# Patient Record
Sex: MALE | Race: WHITE | NOT HISPANIC OR LATINO | Employment: OTHER | URBAN - METROPOLITAN AREA
[De-identification: names, ages, dates, MRNs, and addresses within clinical notes are randomized per-mention and may not be internally consistent; named-entity substitution may affect disease eponyms.]

---

## 2017-01-12 ENCOUNTER — ALLSCRIPTS OFFICE VISIT (OUTPATIENT)
Dept: OTHER | Facility: OTHER | Age: 72
End: 2017-01-12

## 2017-01-12 DIAGNOSIS — E78.5 HYPERLIPIDEMIA: ICD-10-CM

## 2017-01-12 DIAGNOSIS — M10.9 GOUT: ICD-10-CM

## 2017-01-12 DIAGNOSIS — I10 ESSENTIAL (PRIMARY) HYPERTENSION: ICD-10-CM

## 2017-01-18 ENCOUNTER — ALLSCRIPTS OFFICE VISIT (OUTPATIENT)
Dept: OTHER | Facility: OTHER | Age: 72
End: 2017-01-18

## 2017-01-26 ENCOUNTER — HOSPITAL ENCOUNTER (OUTPATIENT)
Dept: NON INVASIVE DIAGNOSTICS | Facility: HOSPITAL | Age: 72
Discharge: HOME | End: 2017-01-26
Attending: SURGERY
Payer: COMMERCIAL

## 2017-01-26 ENCOUNTER — APPOINTMENT (OUTPATIENT)
Dept: PREADMISSION TESTING | Facility: HOSPITAL | Age: 72
End: 2017-01-26
Payer: COMMERCIAL

## 2017-01-26 ENCOUNTER — APPOINTMENT (OUTPATIENT)
Dept: LAB | Facility: HOSPITAL | Age: 72
End: 2017-01-26
Attending: SURGERY
Payer: COMMERCIAL

## 2017-01-26 ENCOUNTER — ANESTHESIA EVENT (OUTPATIENT)
Dept: PERIOP | Facility: HOSPITAL | Age: 72
End: 2017-01-26
Payer: COMMERCIAL

## 2017-01-26 ENCOUNTER — TRANSCRIBE ORDERS (OUTPATIENT)
Dept: ADMINISTRATIVE | Facility: HOSPITAL | Age: 72
End: 2017-01-26

## 2017-01-26 VITALS
TEMPERATURE: 97.9 F | RESPIRATION RATE: 16 BRPM | HEIGHT: 67 IN | HEART RATE: 72 BPM | DIASTOLIC BLOOD PRESSURE: 90 MMHG | BODY MASS INDEX: 24.8 KG/M2 | SYSTOLIC BLOOD PRESSURE: 156 MMHG | WEIGHT: 158 LBS

## 2017-01-26 DIAGNOSIS — K40.90 INGUINAL HERNIA WITHOUT OBSTRUCTION OR GANGRENE, RECURRENCE NOT SPECIFIED, UNSPECIFIED LATERALITY: ICD-10-CM

## 2017-01-26 DIAGNOSIS — Z01.818 PRE-OP TESTING: ICD-10-CM

## 2017-01-26 DIAGNOSIS — K40.90 INGUINAL HERNIA WITHOUT OBSTRUCTION OR GANGRENE, RECURRENCE NOT SPECIFIED, UNSPECIFIED LATERALITY: Primary | ICD-10-CM

## 2017-01-26 LAB
ATRIAL RATE: 72 BPM
P AXIS: 55 DEGREES
PR INTERVAL: 166 MS
QRS AXIS: 37 DEGREES
QRSD INTERVAL: 86 MS
QT INTERVAL: 384 MS
QTC INTERVAL: 420 MS
T WAVE AXIS: 58 DEGREES
VENTRICULAR RATE: 72 BPM

## 2017-01-26 PROCEDURE — 93005 ELECTROCARDIOGRAM TRACING: CPT

## 2017-01-26 RX ORDER — NAPROXEN 250 MG/1
250 TABLET ORAL 2 TIMES DAILY PRN
COMMUNITY
End: 2020-10-04 | Stop reason: HOSPADM

## 2017-01-26 RX ORDER — METRONIDAZOLE 10 MG/G
GEL TOPICAL
COMMUNITY
End: 2018-06-08 | Stop reason: SDUPTHER

## 2017-01-26 RX ORDER — SODIUM CHLORIDE 9 MG/ML
125 INJECTION, SOLUTION INTRAVENOUS CONTINUOUS
Status: CANCELLED | OUTPATIENT
Start: 2017-01-26

## 2017-01-26 RX ORDER — ALLOPURINOL 300 MG/1
300 TABLET ORAL DAILY
COMMUNITY
End: 2018-04-10 | Stop reason: SDUPTHER

## 2017-01-26 RX ORDER — SILODOSIN 4 MG/1
4 CAPSULE ORAL EVERY MORNING
COMMUNITY
End: 2018-10-11 | Stop reason: SDUPTHER

## 2017-01-26 RX ORDER — TRAVOPROST OPHTHALMIC SOLUTION 0.04 MG/ML
1 SOLUTION OPHTHALMIC
COMMUNITY
End: 2020-02-26 | Stop reason: ALTCHOICE

## 2017-01-26 RX ORDER — RANITIDINE 150 MG/1
150 CAPSULE ORAL
COMMUNITY
End: 2020-02-26 | Stop reason: ALTCHOICE

## 2017-01-26 RX ORDER — GABAPENTIN 300 MG/1
300 CAPSULE ORAL
COMMUNITY
End: 2020-02-26 | Stop reason: SDUPTHER

## 2017-01-26 RX ORDER — ACETAMINOPHEN 325 MG/1
650 TABLET ORAL EVERY 6 HOURS PRN
COMMUNITY
End: 2020-02-26 | Stop reason: ALTCHOICE

## 2017-01-26 RX ORDER — OMEPRAZOLE 20 MG/1
20 CAPSULE, DELAYED RELEASE ORAL DAILY
COMMUNITY
End: 2020-02-26 | Stop reason: SDUPTHER

## 2017-01-26 RX ORDER — AMOXICILLIN 500 MG/1
500 CAPSULE ORAL ONCE AS NEEDED
COMMUNITY
End: 2020-10-04 | Stop reason: HOSPADM

## 2017-01-26 RX ORDER — LOSARTAN POTASSIUM AND HYDROCHLOROTHIAZIDE 25; 100 MG/1; MG/1
1 TABLET ORAL DAILY
COMMUNITY
End: 2019-04-03 | Stop reason: SDUPTHER

## 2017-02-09 ENCOUNTER — HOSPITAL ENCOUNTER (OUTPATIENT)
Facility: HOSPITAL | Age: 72
Setting detail: OUTPATIENT SURGERY
Discharge: HOME | End: 2017-02-09
Attending: SURGERY | Admitting: SURGERY
Payer: COMMERCIAL

## 2017-02-09 ENCOUNTER — ANESTHESIA (OUTPATIENT)
Dept: PERIOP | Facility: HOSPITAL | Age: 72
End: 2017-02-09
Payer: COMMERCIAL

## 2017-02-09 VITALS
OXYGEN SATURATION: 99 % | SYSTOLIC BLOOD PRESSURE: 158 MMHG | TEMPERATURE: 97.7 F | HEART RATE: 64 BPM | DIASTOLIC BLOOD PRESSURE: 71 MMHG | RESPIRATION RATE: 16 BRPM | HEIGHT: 67 IN | BODY MASS INDEX: 24.8 KG/M2 | WEIGHT: 158 LBS

## 2017-02-09 PROCEDURE — C1781 MESH (IMPLANTABLE): HCPCS | Performed by: SURGERY

## 2017-02-09 PROCEDURE — A9270 NON-COVERED ITEM OR SERVICE: HCPCS | Performed by: SURGERY

## 2017-02-09 PROCEDURE — C1713 ANCHOR/SCREW BN/BN,TIS/BN: HCPCS | Performed by: SURGERY

## 2017-02-09 DEVICE — BARD 3DMAX MESH LEFT LARGE
Type: IMPLANTABLE DEVICE | Site: INGUINAL | Status: FUNCTIONAL
Brand: BARD 3DMAX MESH

## 2017-02-09 RX ORDER — ALBUTEROL SULFATE 2.5 MG/3ML
2.5 SOLUTION RESPIRATORY (INHALATION) ONCE AS NEEDED
Status: DISCONTINUED | OUTPATIENT
Start: 2017-02-09 | End: 2017-02-09 | Stop reason: HOSPADM

## 2017-02-09 RX ORDER — MORPHINE SULFATE 2 MG/ML
2 INJECTION, SOLUTION INTRAMUSCULAR; INTRAVENOUS
Status: DISCONTINUED | OUTPATIENT
Start: 2017-02-09 | End: 2017-02-09 | Stop reason: HOSPADM

## 2017-02-09 RX ORDER — MAGNESIUM HYDROXIDE 1200 MG/15ML
LIQUID ORAL AS NEEDED
Status: DISCONTINUED | OUTPATIENT
Start: 2017-02-09 | End: 2017-02-09 | Stop reason: HOSPADM

## 2017-02-09 RX ORDER — FENTANYL CITRATE/PF 50 MCG/ML
50 SYRINGE (ML) INJECTION
Status: DISCONTINUED | OUTPATIENT
Start: 2017-02-09 | End: 2017-02-09 | Stop reason: HOSPADM

## 2017-02-09 RX ORDER — KETOROLAC TROMETHAMINE 30 MG/ML
INJECTION, SOLUTION INTRAMUSCULAR; INTRAVENOUS AS NEEDED
Status: DISCONTINUED | OUTPATIENT
Start: 2017-02-09 | End: 2017-02-09 | Stop reason: SURG

## 2017-02-09 RX ORDER — HYDROCODONE BITARTRATE AND ACETAMINOPHEN 5; 325 MG/1; MG/1
1 TABLET ORAL EVERY 4 HOURS PRN
Status: DISCONTINUED | OUTPATIENT
Start: 2017-02-09 | End: 2017-02-09 | Stop reason: HOSPADM

## 2017-02-09 RX ORDER — HYDROCODONE BITARTRATE AND ACETAMINOPHEN 5; 325 MG/1; MG/1
2 TABLET ORAL EVERY 6 HOURS PRN
Status: DISCONTINUED | OUTPATIENT
Start: 2017-02-09 | End: 2017-02-09 | Stop reason: HOSPADM

## 2017-02-09 RX ORDER — MIDAZOLAM HYDROCHLORIDE 1 MG/ML
INJECTION INTRAMUSCULAR; INTRAVENOUS AS NEEDED
Status: DISCONTINUED | OUTPATIENT
Start: 2017-02-09 | End: 2017-02-09 | Stop reason: SURG

## 2017-02-09 RX ORDER — SODIUM CHLORIDE, SODIUM LACTATE, POTASSIUM CHLORIDE, CALCIUM CHLORIDE 600; 310; 30; 20 MG/100ML; MG/100ML; MG/100ML; MG/100ML
125 INJECTION, SOLUTION INTRAVENOUS CONTINUOUS
Status: DISCONTINUED | OUTPATIENT
Start: 2017-02-09 | End: 2017-02-09 | Stop reason: HOSPADM

## 2017-02-09 RX ORDER — GLYCOPYRROLATE 0.2 MG/ML
INJECTION INTRAMUSCULAR; INTRAVENOUS AS NEEDED
Status: DISCONTINUED | OUTPATIENT
Start: 2017-02-09 | End: 2017-02-09 | Stop reason: SURG

## 2017-02-09 RX ORDER — HYDROCODONE BITARTRATE AND ACETAMINOPHEN 5; 325 MG/1; MG/1
1-2 TABLET ORAL EVERY 6 HOURS PRN
Qty: 30 TABLET | Refills: 0 | Status: SHIPPED | OUTPATIENT
Start: 2017-02-09 | End: 2017-02-19

## 2017-02-09 RX ORDER — BUPIVACAINE HYDROCHLORIDE AND EPINEPHRINE 2.5; 5 MG/ML; UG/ML
INJECTION, SOLUTION EPIDURAL; INFILTRATION; INTRACAUDAL; PERINEURAL AS NEEDED
Status: DISCONTINUED | OUTPATIENT
Start: 2017-02-09 | End: 2017-02-09 | Stop reason: HOSPADM

## 2017-02-09 RX ORDER — MULTIVITAMIN
1 CAPSULE ORAL DAILY
COMMUNITY
End: 2020-02-26 | Stop reason: ALTCHOICE

## 2017-02-09 RX ORDER — SODIUM CHLORIDE 9 MG/ML
125 INJECTION, SOLUTION INTRAVENOUS CONTINUOUS
Status: DISCONTINUED | OUTPATIENT
Start: 2017-02-09 | End: 2017-02-09 | Stop reason: HOSPADM

## 2017-02-09 RX ORDER — EPHEDRINE SULFATE 50 MG/ML
INJECTION, SOLUTION INTRAVENOUS AS NEEDED
Status: DISCONTINUED | OUTPATIENT
Start: 2017-02-09 | End: 2017-02-09 | Stop reason: SURG

## 2017-02-09 RX ORDER — PROPOFOL 10 MG/ML
INJECTION, EMULSION INTRAVENOUS AS NEEDED
Status: DISCONTINUED | OUTPATIENT
Start: 2017-02-09 | End: 2017-02-09 | Stop reason: SURG

## 2017-02-09 RX ORDER — ONDANSETRON 2 MG/ML
4 INJECTION INTRAMUSCULAR; INTRAVENOUS ONCE
Status: DISCONTINUED | OUTPATIENT
Start: 2017-02-09 | End: 2017-02-09 | Stop reason: HOSPADM

## 2017-02-09 RX ORDER — FENTANYL CITRATE 50 UG/ML
INJECTION, SOLUTION INTRAMUSCULAR; INTRAVENOUS AS NEEDED
Status: DISCONTINUED | OUTPATIENT
Start: 2017-02-09 | End: 2017-02-09 | Stop reason: SURG

## 2017-02-09 RX ORDER — ROCURONIUM BROMIDE 10 MG/ML
INJECTION, SOLUTION INTRAVENOUS AS NEEDED
Status: DISCONTINUED | OUTPATIENT
Start: 2017-02-09 | End: 2017-02-09 | Stop reason: SURG

## 2017-02-09 RX ORDER — ONDANSETRON 2 MG/ML
INJECTION INTRAMUSCULAR; INTRAVENOUS AS NEEDED
Status: DISCONTINUED | OUTPATIENT
Start: 2017-02-09 | End: 2017-02-09 | Stop reason: SURG

## 2017-02-09 RX ADMIN — ONDANSETRON HYDROCHLORIDE 4 MG: 2 INJECTION, SOLUTION INTRAVENOUS at 11:50

## 2017-02-09 RX ADMIN — HYDROCODONE BITARTRATE AND ACETAMINOPHEN 1 TABLET: 5; 325 TABLET ORAL at 14:44

## 2017-02-09 RX ADMIN — PROPOFOL 200 MG: 10 INJECTION, EMULSION INTRAVENOUS at 12:01

## 2017-02-09 RX ADMIN — EPHEDRINE SULFATE 10 MG: 50 INJECTION, SOLUTION INTRAMUSCULAR; INTRAVENOUS; SUBCUTANEOUS at 12:18

## 2017-02-09 RX ADMIN — EPHEDRINE SULFATE 10 MG: 50 INJECTION, SOLUTION INTRAMUSCULAR; INTRAVENOUS; SUBCUTANEOUS at 12:15

## 2017-02-09 RX ADMIN — FENTANYL CITRATE 50 MCG: 50 INJECTION, SOLUTION INTRAMUSCULAR; INTRAVENOUS at 12:30

## 2017-02-09 RX ADMIN — CEFAZOLIN SODIUM 1000 MG: 1 SOLUTION INTRAVENOUS at 11:50

## 2017-02-09 RX ADMIN — ROCURONIUM BROMIDE 10 MG: 10 INJECTION, SOLUTION INTRAVENOUS at 12:25

## 2017-02-09 RX ADMIN — MIDAZOLAM HYDROCHLORIDE 2 MG: 1 INJECTION, SOLUTION INTRAMUSCULAR; INTRAVENOUS at 11:50

## 2017-02-09 RX ADMIN — SODIUM CHLORIDE: 0.9 INJECTION, SOLUTION INTRAVENOUS at 13:15

## 2017-02-09 RX ADMIN — GLYCOPYRROLATE 0.4 MG: 0.2 INJECTION, SOLUTION INTRAMUSCULAR; INTRAVENOUS at 13:13

## 2017-02-09 RX ADMIN — FENTANYL CITRATE 100 MCG: 50 INJECTION, SOLUTION INTRAMUSCULAR; INTRAVENOUS at 12:01

## 2017-02-09 RX ADMIN — FENTANYL CITRATE 100 MCG: 50 INJECTION, SOLUTION INTRAMUSCULAR; INTRAVENOUS at 12:25

## 2017-02-09 RX ADMIN — ROCURONIUM BROMIDE 40 MG: 10 INJECTION, SOLUTION INTRAVENOUS at 12:01

## 2017-02-09 RX ADMIN — NEOSTIGMINE METHYLSULFATE 3 MG: 1 INJECTION INTRAMUSCULAR; INTRAVENOUS; SUBCUTANEOUS at 13:13

## 2017-02-09 RX ADMIN — SODIUM CHLORIDE 125 ML/HR: 0.9 INJECTION, SOLUTION INTRAVENOUS at 10:00

## 2017-02-09 RX ADMIN — KETOROLAC TROMETHAMINE 30 MG: 30 INJECTION, SOLUTION INTRAMUSCULAR at 13:05

## 2017-02-09 RX ADMIN — SODIUM CHLORIDE: 0.9 INJECTION, SOLUTION INTRAVENOUS at 12:17

## 2017-02-22 ENCOUNTER — ALLSCRIPTS OFFICE VISIT (OUTPATIENT)
Dept: OTHER | Facility: OTHER | Age: 72
End: 2017-02-22

## 2017-06-22 ENCOUNTER — ALLSCRIPTS OFFICE VISIT (OUTPATIENT)
Dept: OTHER | Facility: OTHER | Age: 72
End: 2017-06-22

## 2017-06-30 ENCOUNTER — HOSPITAL ENCOUNTER (INPATIENT)
Facility: HOSPITAL | Age: 72
LOS: 1 days | Discharge: HOME | DRG: 869 | End: 2017-07-01
Attending: EMERGENCY MEDICINE | Admitting: INTERNAL MEDICINE
Payer: COMMERCIAL

## 2017-06-30 ENCOUNTER — ALLSCRIPTS OFFICE VISIT (OUTPATIENT)
Dept: OTHER | Facility: OTHER | Age: 72
End: 2017-06-30

## 2017-06-30 ENCOUNTER — APPOINTMENT (EMERGENCY)
Dept: CT IMAGING | Facility: HOSPITAL | Age: 72
DRG: 869 | End: 2017-06-30
Payer: COMMERCIAL

## 2017-06-30 DIAGNOSIS — Z78.9 FAILURE OF OUTPATIENT TREATMENT: ICD-10-CM

## 2017-06-30 DIAGNOSIS — L03.116 CELLULITIS OF LEFT LOWER EXTREMITY: Primary | ICD-10-CM

## 2017-06-30 PROBLEM — K21.9 GASTROESOPHAGEAL REFLUX DISEASE WITHOUT ESOPHAGITIS: Chronic | Status: ACTIVE | Noted: 2017-06-30

## 2017-06-30 PROBLEM — H25.9 AGE-RELATED CATARACT OF BOTH EYES: Chronic | Status: ACTIVE | Noted: 2017-06-30

## 2017-06-30 PROBLEM — Z87.19 HISTORY OF ABDOMINAL HERNIA: Status: ACTIVE | Noted: 2017-06-30

## 2017-06-30 PROBLEM — Z96.641 HISTORY OF TOTAL RIGHT HIP ARTHROPLASTY: Chronic | Status: ACTIVE | Noted: 2017-06-30

## 2017-06-30 PROBLEM — G60.9 IDIOPATHIC PERIPHERAL NEUROPATHY: Chronic | Status: ACTIVE | Noted: 2017-06-30

## 2017-06-30 PROBLEM — I10 ESSENTIAL HYPERTENSION: Chronic | Status: ACTIVE | Noted: 2017-06-30

## 2017-06-30 PROBLEM — M1A.9XX0 CHRONIC GOUT: Chronic | Status: ACTIVE | Noted: 2017-06-30

## 2017-06-30 PROBLEM — N41.1 PROSTATITIS, CHRONIC: Chronic | Status: ACTIVE | Noted: 2017-06-30

## 2017-06-30 PROBLEM — H40.1190 PRIMARY OPEN ANGLE GLAUCOMA: Chronic | Status: ACTIVE | Noted: 2017-06-30

## 2017-06-30 PROBLEM — L71.9 ROSACEA: Chronic | Status: ACTIVE | Noted: 2017-06-30

## 2017-06-30 PROBLEM — N20.0 NEPHROLITHIASIS: Chronic | Status: ACTIVE | Noted: 2017-06-30

## 2017-06-30 LAB
ANION GAP SERPL CALCULATED.3IONS-SCNC: 7 MMOL/L (ref 4–13)
BACTERIA UR QL AUTO: ABNORMAL /HPF
BASOPHILS # BLD AUTO: 0.03 THOUSANDS/ΜL (ref 0–0.1)
BASOPHILS NFR BLD AUTO: 1 % (ref 0–1)
BILIRUB UR QL STRIP: NEGATIVE
BUN SERPL-MCNC: 20 MG/DL (ref 5–25)
CALCIUM SERPL-MCNC: 9.1 MG/DL (ref 8.3–10.1)
CHLORIDE SERPL-SCNC: 104 MMOL/L (ref 100–108)
CLARITY UR: CLEAR
CO2 SERPL-SCNC: 27 MMOL/L (ref 21–32)
COLOR UR: YELLOW
CREAT SERPL-MCNC: 1.15 MG/DL (ref 0.6–1.3)
CRP SERPL QL: 34.3 MG/L
EOSINOPHIL # BLD AUTO: 0.13 THOUSAND/ΜL (ref 0–0.61)
EOSINOPHIL NFR BLD AUTO: 3 % (ref 0–6)
ERYTHROCYTE [DISTWIDTH] IN BLOOD BY AUTOMATED COUNT: 15.7 % (ref 11.6–15.1)
ERYTHROCYTE [SEDIMENTATION RATE] IN BLOOD: 12 MM/HOUR (ref 0–10)
GFR SERPL CREATININE-BSD FRML MDRD: >60 ML/MIN/1.73SQ M
GLUCOSE SERPL-MCNC: 114 MG/DL (ref 65–140)
GLUCOSE UR STRIP-MCNC: NEGATIVE MG/DL
HCT VFR BLD AUTO: 41.3 % (ref 36.5–49.3)
HGB BLD-MCNC: 13.7 G/DL (ref 12–17)
HGB UR QL STRIP.AUTO: NEGATIVE
KETONES UR STRIP-MCNC: NEGATIVE MG/DL
LACTATE SERPL-SCNC: 0.7 MMOL/L (ref 0.5–2)
LEUKOCYTE ESTERASE UR QL STRIP: ABNORMAL
LYMPHOCYTES # BLD AUTO: 0.99 THOUSANDS/ΜL (ref 0.6–4.47)
LYMPHOCYTES NFR BLD AUTO: 19 % (ref 14–44)
MAGNESIUM SERPL-MCNC: 2 MG/DL (ref 1.6–2.6)
MCH RBC QN AUTO: 27.2 PG (ref 26.8–34.3)
MCHC RBC AUTO-ENTMCNC: 33.2 G/DL (ref 31.4–37.4)
MCV RBC AUTO: 82 FL (ref 82–98)
MONOCYTES # BLD AUTO: 0.45 THOUSAND/ΜL (ref 0.17–1.22)
MONOCYTES NFR BLD AUTO: 9 % (ref 4–12)
NEUTROPHILS # BLD AUTO: 3.63 THOUSANDS/ΜL (ref 1.85–7.62)
NEUTS SEG NFR BLD AUTO: 68 % (ref 43–75)
NITRITE UR QL STRIP: NEGATIVE
NON-SQ EPI CELLS URNS QL MICRO: ABNORMAL /HPF
NRBC BLD AUTO-RTO: 0 /100 WBCS
PH UR STRIP.AUTO: 5.5 [PH] (ref 4.5–8)
PLATELET # BLD AUTO: 253 THOUSANDS/UL (ref 149–390)
PMV BLD AUTO: 9.7 FL (ref 8.9–12.7)
POTASSIUM SERPL-SCNC: 4 MMOL/L (ref 3.5–5.3)
PROT UR STRIP-MCNC: NEGATIVE MG/DL
RBC # BLD AUTO: 5.04 MILLION/UL (ref 3.88–5.62)
RBC #/AREA URNS AUTO: ABNORMAL /HPF
SODIUM SERPL-SCNC: 138 MMOL/L (ref 136–145)
SP GR UR STRIP.AUTO: 1.02 (ref 1–1.03)
UROBILINOGEN UR QL STRIP.AUTO: 0.2 E.U./DL
WBC # BLD AUTO: 5.23 THOUSAND/UL (ref 4.31–10.16)
WBC #/AREA URNS AUTO: ABNORMAL /HPF

## 2017-06-30 PROCEDURE — 36415 COLL VENOUS BLD VENIPUNCTURE: CPT | Performed by: EMERGENCY MEDICINE

## 2017-06-30 PROCEDURE — 81001 URINALYSIS AUTO W/SCOPE: CPT

## 2017-06-30 PROCEDURE — 81002 URINALYSIS NONAUTO W/O SCOPE: CPT | Performed by: EMERGENCY MEDICINE

## 2017-06-30 PROCEDURE — 80048 BASIC METABOLIC PNL TOTAL CA: CPT | Performed by: EMERGENCY MEDICINE

## 2017-06-30 PROCEDURE — 83735 ASSAY OF MAGNESIUM: CPT | Performed by: EMERGENCY MEDICINE

## 2017-06-30 PROCEDURE — 99284 EMERGENCY DEPT VISIT MOD MDM: CPT

## 2017-06-30 PROCEDURE — 73701 CT LOWER EXTREMITY W/DYE: CPT

## 2017-06-30 PROCEDURE — 85652 RBC SED RATE AUTOMATED: CPT | Performed by: PHYSICIAN ASSISTANT

## 2017-06-30 PROCEDURE — 86140 C-REACTIVE PROTEIN: CPT | Performed by: PHYSICIAN ASSISTANT

## 2017-06-30 PROCEDURE — 87040 BLOOD CULTURE FOR BACTERIA: CPT | Performed by: EMERGENCY MEDICINE

## 2017-06-30 PROCEDURE — 83605 ASSAY OF LACTIC ACID: CPT | Performed by: EMERGENCY MEDICINE

## 2017-06-30 PROCEDURE — 96365 THER/PROPH/DIAG IV INF INIT: CPT

## 2017-06-30 PROCEDURE — 85025 COMPLETE CBC W/AUTO DIFF WBC: CPT | Performed by: EMERGENCY MEDICINE

## 2017-06-30 RX ORDER — TAMSULOSIN HYDROCHLORIDE 0.4 MG/1
0.4 CAPSULE ORAL
Status: DISCONTINUED | OUTPATIENT
Start: 2017-07-01 | End: 2017-07-01

## 2017-06-30 RX ORDER — VANCOMYCIN HYDROCHLORIDE 1 G/200ML
15 INJECTION, SOLUTION INTRAVENOUS ONCE
Status: COMPLETED | OUTPATIENT
Start: 2017-06-30 | End: 2017-06-30

## 2017-06-30 RX ORDER — ALLOPURINOL 300 MG/1
300 TABLET ORAL DAILY
Status: DISCONTINUED | OUTPATIENT
Start: 2017-07-01 | End: 2017-07-01 | Stop reason: HOSPADM

## 2017-06-30 RX ORDER — HEPARIN SODIUM 5000 [USP'U]/ML
5000 INJECTION, SOLUTION INTRAVENOUS; SUBCUTANEOUS EVERY 8 HOURS SCHEDULED
Status: DISCONTINUED | OUTPATIENT
Start: 2017-06-30 | End: 2017-07-01 | Stop reason: HOSPADM

## 2017-06-30 RX ORDER — ST. JOHN'S WORT 300 MG
CAPSULE ORAL DAILY
Status: DISCONTINUED | OUTPATIENT
Start: 2017-07-01 | End: 2017-06-30 | Stop reason: CLARIF

## 2017-06-30 RX ORDER — VANCOMYCIN HYDROCHLORIDE 1 G/200ML
15 INJECTION, SOLUTION INTRAVENOUS EVERY 12 HOURS
Status: DISCONTINUED | OUTPATIENT
Start: 2017-07-01 | End: 2017-07-01

## 2017-06-30 RX ORDER — CEPHALEXIN 500 MG/1
500 CAPSULE ORAL EVERY 8 HOURS SCHEDULED
COMMUNITY
End: 2017-07-01 | Stop reason: HOSPADM

## 2017-06-30 RX ORDER — TRAVOPROST OPHTHALMIC SOLUTION 0.04 MG/ML
1 SOLUTION OPHTHALMIC
Status: DISCONTINUED | OUTPATIENT
Start: 2017-06-30 | End: 2017-07-01 | Stop reason: HOSPADM

## 2017-06-30 RX ORDER — LANOLIN ALCOHOL/MO/W.PET/CERES
6 CREAM (GRAM) TOPICAL
Status: DISCONTINUED | OUTPATIENT
Start: 2017-06-30 | End: 2017-07-01 | Stop reason: HOSPADM

## 2017-06-30 RX ORDER — METRONIDAZOLE 10 MG/G
GEL TOPICAL
Status: DISCONTINUED | OUTPATIENT
Start: 2017-06-30 | End: 2017-07-01 | Stop reason: RX

## 2017-06-30 RX ORDER — ONDANSETRON 2 MG/ML
4 INJECTION INTRAMUSCULAR; INTRAVENOUS EVERY 4 HOURS PRN
Status: DISCONTINUED | OUTPATIENT
Start: 2017-06-30 | End: 2017-07-01 | Stop reason: HOSPADM

## 2017-06-30 RX ORDER — GABAPENTIN 300 MG/1
300 CAPSULE ORAL
Status: DISCONTINUED | OUTPATIENT
Start: 2017-06-30 | End: 2017-07-01 | Stop reason: HOSPADM

## 2017-06-30 RX ORDER — PANTOPRAZOLE SODIUM 20 MG/1
20 TABLET, DELAYED RELEASE ORAL
Status: DISCONTINUED | OUTPATIENT
Start: 2017-07-01 | End: 2017-07-01 | Stop reason: HOSPADM

## 2017-06-30 RX ORDER — ACETAMINOPHEN 325 MG/1
650 TABLET ORAL EVERY 6 HOURS PRN
Status: DISCONTINUED | OUTPATIENT
Start: 2017-06-30 | End: 2017-07-01 | Stop reason: HOSPADM

## 2017-06-30 RX ADMIN — GABAPENTIN 300 MG: 300 CAPSULE ORAL at 22:05

## 2017-06-30 RX ADMIN — VANCOMYCIN HYDROCHLORIDE 1000 MG: 1 INJECTION, SOLUTION INTRAVENOUS at 16:14

## 2017-06-30 RX ADMIN — HEPARIN SODIUM 5000 UNITS: 5000 INJECTION, SOLUTION INTRAVENOUS; SUBCUTANEOUS at 22:06

## 2017-06-30 RX ADMIN — TRAVOPROST 1 DROP: 0.04 SOLUTION/ DROPS OPHTHALMIC at 22:05

## 2017-06-30 RX ADMIN — IOHEXOL 100 ML: 350 INJECTION, SOLUTION INTRAVENOUS at 17:28

## 2017-06-30 RX ADMIN — SODIUM CHLORIDE 1000 ML: 0.9 INJECTION, SOLUTION INTRAVENOUS at 16:05

## 2017-06-30 RX ADMIN — METRONIDAZOLE: 10 GEL TOPICAL at 22:07

## 2017-07-01 VITALS
DIASTOLIC BLOOD PRESSURE: 83 MMHG | SYSTOLIC BLOOD PRESSURE: 157 MMHG | BODY MASS INDEX: 25.84 KG/M2 | RESPIRATION RATE: 18 BRPM | WEIGHT: 165 LBS | TEMPERATURE: 95.5 F | OXYGEN SATURATION: 97 % | HEART RATE: 64 BPM

## 2017-07-01 PROBLEM — A69.20 LYME DISEASE: Status: ACTIVE | Noted: 2017-06-30

## 2017-07-01 LAB
ANION GAP SERPL CALCULATED.3IONS-SCNC: 9 MMOL/L (ref 4–13)
BUN SERPL-MCNC: 18 MG/DL (ref 5–25)
CALCIUM SERPL-MCNC: 9.2 MG/DL (ref 8.3–10.1)
CHLORIDE SERPL-SCNC: 104 MMOL/L (ref 100–108)
CO2 SERPL-SCNC: 28 MMOL/L (ref 21–32)
CREAT SERPL-MCNC: 1.04 MG/DL (ref 0.6–1.3)
ERYTHROCYTE [DISTWIDTH] IN BLOOD BY AUTOMATED COUNT: 15.8 % (ref 11.6–15.1)
GFR SERPL CREATININE-BSD FRML MDRD: >60 ML/MIN/1.73SQ M
GLUCOSE SERPL-MCNC: 104 MG/DL (ref 65–140)
HCT VFR BLD AUTO: 41.4 % (ref 36.5–49.3)
HGB BLD-MCNC: 13.3 G/DL (ref 12–17)
MCH RBC QN AUTO: 26.7 PG (ref 26.8–34.3)
MCHC RBC AUTO-ENTMCNC: 32.1 G/DL (ref 31.4–37.4)
MCV RBC AUTO: 83 FL (ref 82–98)
PLATELET # BLD AUTO: 250 THOUSANDS/UL (ref 149–390)
PMV BLD AUTO: 10.5 FL (ref 8.9–12.7)
POTASSIUM SERPL-SCNC: 4 MMOL/L (ref 3.5–5.3)
RBC # BLD AUTO: 4.98 MILLION/UL (ref 3.88–5.62)
SODIUM SERPL-SCNC: 141 MMOL/L (ref 136–145)
WBC # BLD AUTO: 5.54 THOUSAND/UL (ref 4.31–10.16)

## 2017-07-01 PROCEDURE — 85027 COMPLETE CBC AUTOMATED: CPT | Performed by: PHYSICIAN ASSISTANT

## 2017-07-01 PROCEDURE — 80048 BASIC METABOLIC PNL TOTAL CA: CPT | Performed by: PHYSICIAN ASSISTANT

## 2017-07-01 RX ORDER — SILODOSIN 4 MG/1
4 CAPSULE ORAL EVERY MORNING
Status: DISCONTINUED | OUTPATIENT
Start: 2017-07-02 | End: 2017-07-01 | Stop reason: HOSPADM

## 2017-07-01 RX ORDER — DOXYCYCLINE HYCLATE 100 MG/1
100 CAPSULE ORAL EVERY 12 HOURS SCHEDULED
Status: DISCONTINUED | OUTPATIENT
Start: 2017-07-01 | End: 2017-07-01 | Stop reason: HOSPADM

## 2017-07-01 RX ORDER — DOXYCYCLINE HYCLATE 100 MG/1
100 CAPSULE ORAL EVERY 12 HOURS SCHEDULED
Qty: 27 CAPSULE | Refills: 0 | Status: SHIPPED | OUTPATIENT
Start: 2017-07-01 | End: 2017-07-15

## 2017-07-01 RX ADMIN — ALLOPURINOL 300 MG: 300 TABLET ORAL at 08:19

## 2017-07-01 RX ADMIN — HYDROCHLOROTHIAZIDE: 25 TABLET ORAL at 08:18

## 2017-07-01 RX ADMIN — HEPARIN SODIUM 5000 UNITS: 5000 INJECTION, SOLUTION INTRAVENOUS; SUBCUTANEOUS at 06:09

## 2017-07-01 RX ADMIN — DOXYCYCLINE 100 MG: 100 CAPSULE ORAL at 12:30

## 2017-07-01 RX ADMIN — PANTOPRAZOLE SODIUM 20 MG: 20 TABLET, DELAYED RELEASE ORAL at 06:09

## 2017-07-01 RX ADMIN — VANCOMYCIN HYDROCHLORIDE 1000 MG: 1 INJECTION, SOLUTION INTRAVENOUS at 04:41

## 2017-07-05 LAB
BACTERIA BLD CULT: NORMAL
BACTERIA BLD CULT: NORMAL

## 2017-07-10 ENCOUNTER — ALLSCRIPTS OFFICE VISIT (OUTPATIENT)
Dept: OTHER | Facility: OTHER | Age: 72
End: 2017-07-10

## 2017-07-10 ENCOUNTER — GENERIC CONVERSION - ENCOUNTER (OUTPATIENT)
Dept: OTHER | Facility: OTHER | Age: 72
End: 2017-07-10

## 2017-10-26 ENCOUNTER — HOSPITAL ENCOUNTER (OUTPATIENT)
Dept: RADIOLOGY | Facility: HOSPITAL | Age: 72
Discharge: HOME | End: 2017-10-26
Payer: COMMERCIAL

## 2017-10-26 ENCOUNTER — APPOINTMENT (OUTPATIENT)
Dept: LAB | Facility: HOSPITAL | Age: 72
End: 2017-10-26
Attending: UROLOGY
Payer: COMMERCIAL

## 2017-10-26 DIAGNOSIS — Z12.5 ENCOUNTER FOR SCREENING FOR MALIGNANT NEOPLASM OF PROSTATE: ICD-10-CM

## 2017-10-26 DIAGNOSIS — N20.0 CALCULUS OF KIDNEY: ICD-10-CM

## 2017-10-26 LAB — PSA SERPL-MCNC: 2 NG/ML (ref 0–4)

## 2017-10-26 PROCEDURE — G0103 PSA SCREENING: HCPCS

## 2017-10-26 PROCEDURE — 74000 HB X-RAY EXAM OF ABDOMEN (SINGLE ANTEROPOSTERIOR VIEW): CPT

## 2017-10-26 PROCEDURE — 36415 COLL VENOUS BLD VENIPUNCTURE: CPT

## 2017-10-31 ENCOUNTER — GENERIC CONVERSION - ENCOUNTER (OUTPATIENT)
Dept: OTHER | Facility: OTHER | Age: 72
End: 2017-10-31

## 2017-11-09 ENCOUNTER — GENERIC CONVERSION - ENCOUNTER (OUTPATIENT)
Dept: OTHER | Facility: OTHER | Age: 72
End: 2017-11-09

## 2017-11-10 ENCOUNTER — GENERIC CONVERSION - ENCOUNTER (OUTPATIENT)
Dept: OTHER | Facility: OTHER | Age: 72
End: 2017-11-10

## 2017-11-14 ENCOUNTER — GENERIC CONVERSION - ENCOUNTER (OUTPATIENT)
Dept: INTERNAL MEDICINE CLINIC | Facility: CLINIC | Age: 72
End: 2017-11-14

## 2018-01-09 ENCOUNTER — ALLSCRIPTS OFFICE VISIT (OUTPATIENT)
Dept: OTHER | Facility: OTHER | Age: 73
End: 2018-01-09

## 2018-01-12 VITALS
HEART RATE: 76 BPM | DIASTOLIC BLOOD PRESSURE: 90 MMHG | BODY MASS INDEX: 25.43 KG/M2 | HEIGHT: 67 IN | WEIGHT: 162.05 LBS | SYSTOLIC BLOOD PRESSURE: 140 MMHG | RESPIRATION RATE: 16 BRPM | TEMPERATURE: 98.5 F

## 2018-01-12 NOTE — PROGRESS NOTES
Assessment   1  Nephrolithiasis (592 0) (N20 0)   2  Encounter for prostate cancer screening (V76 44) (Z12 5)   3  Chronic prostatitis (601 1) (N41 1)    Plan   Encounter for prostate cancer screening    · (1) PSA (SCREEN) (Dx V76 44 Screen for Prostate Cancer); Status:Active; Requested    AJR:[id number];    Perform:[facility]; MRL:60BDC3657;TJQYFVZ;UMB:LJZPDAWGH for prostate cancer screening; Ordered By:Jacinda Harris;  Nephrolithiasis    · * XR ABDOMEN 1 VIEW KUB; Status:Active - Retrospective Authorization; Requested    BHW:[id number];    Perform:[facility]; [facility]:[id number]; Last Updated Gilmar Lazaro; 1/9/2018 11:20:59 AM;Ordered;For:Nephrolithiasis; Ordered By:Jacinda Harris;   · Follow-up visit in 1 year Evaluation and Treatment  Follow-up  Status: Complete -    Retrospective Authorization  Done: 25RIW3060   Ordered; For: Nephrolithiasis; Ordered By: Desean Lozano Performed:  Due: 91UGI1294; Last Updated By: Houston Ashton; 1/9/2018 11:22:38 AM    Discussion/Summary   Discussion Summary:    1  nephrolithiasis -- managed by Dr Clement Gilman I reviewed with the patient that his nephrolithiasis stable on imaging  We discussed options of observation versus lithotripsy versus ureteroscopy  He elects for observation at this time  We discussed the importance of proper hydration and dietary modifications in order to prevent future stone formation  follow up 1 year with a KUB prior to visit  Patient is aware to contact us sooner with any signs and symptoms passing stone  All questions answered  prostatitis well controlled  continue self start antibiotic     routine prostate cancer screening PSA stable with benign examination  Follow up in 1 year with PSA prior to visit        Chief Complaint   Chief Complaint Free Text Note Form: Patient presents for nephrolithiasis, prostatitis, prostate cancer screening  = 2 0 (10/26/17)      History of Present Illness   HPI: Irvin Gilliland is a 45-year-old male patient of Dr Clement Gilman with a history of nephrolithiasis, prostatitis, and routine prostate cancer screening    had a recent PSA which was 2 0 (10/26/17), previously 1 4 in March 2016    had a recent KUB (10/26/17) which revealed persistent bilateral renal calculi measuring up to approximately 4 mm on the left and 5 mm on the right  patient has a very long-standing history of nephrolithiasis presenting over the past 20 years  He is required post ureteroscopy and lithotripsy before in the past  Patient does state he did pass a small stone over the past year without difficulty  His stones have classically been calcium oxalate before in the past    does have recurrent pass of prostatitis  He has a prescription for antibiotics for self start treatment with symptoms of prostatitis  He denies any issues over the past year  continues to take Rapaflo daily  He feels like he has a fair stream, and 17 after urination, and has nocturia 1-2 times nightly  He notices some hesitancy in the evening  Otherwise denies any dysuria, gross hematuria, incontinence, suprapubic pressure, or urinary tract infections  Review of Systems   Complete-Male Urology:      Constitutional: No fever or chills, feels well, no tiredness, no recent weight gain or weight loss  Respiratory: No complaints of shortness of breath, no wheezing, no cough, no SOB on exertion, no orthopnea or PND  Cardiovascular: No complaints of slow heart rate, no fast heart rate, no chest pain, no palpitations, no leg claudication, no lower extremity  Gastrointestinal: No complaints of abdominal pain, no constipation, no nausea or vomiting, no diarrhea or bloody stools        Genitourinary: patient reports having pain in the prostate sometimes,-- Empty sensation,-- feelings of urinary urgency-- (Sometimes)-- and-- stream quality fair, but-- no dysuria,-- no hematuria-- and-- no incontinence--       The patient presents with complaints of urinary hesitancy (in the PM)  The patient presents with complaints of no nocturia (1-2 times)      Musculoskeletal: No complaints of arthralgia, no myalgias, no joint swelling or stiffness, no limb pain or swelling  Integumentary: No complaints of skin rash or skin lesions, no itching, no skin wound, no dry skin  Hematologic/Lymphatic: No complaints of swollen glands, no swollen glands in the neck, does not bleed easily, no easy bruising  Neurological: No compliants of headache, no confusion, no convulsions, no numbness or tingling, no dizziness or fainting, no limb weakness, no difficulty walking  ROS Reviewed:    ROS reviewed  Active Problems   1  Chronic prostatitis (601 1) (N41 1)   2  Diverticulosis (562 10) (K57 90)   3  Encounter for prostate cancer screening (V76 44) (Z12 5)   4  GERD (gastroesophageal reflux disease) (530 81) (K21 9)   5  Glaucoma (365 9) (H40 9)   6  Gout (274 9) (M10 9)   7  Hyperlipidemia (272 4) (E78 5)   8  Hypertension (401 9) (I10)   9  Nephrolithiasis (592 0) (N20 0)   10  Osteoarthritis (715 90) (M19 90)   11  Peripheral neuropathy (356 9) (G62 9)   12  Rosacea (695 3) (L71 9)   13  Seborrheic keratosis (702 19) (L82 1)   14  Status post laparoscopic hernia repair (V45 89) (Z98 890,Z87 19)   15  Therapy failure due to antibiotic resistance (V09 80) (Z16 20)    Past Medical History   1  History of Acute bacterial prostatitis (601 0) (N41 0)   2  History of Benign colon polyp (211 3) (K63 5)   3  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)   4  History of bilateral inguinal hernias (V45 89) (Z98 890,Z87 19)   5  History of Lyme disease (V12 09) (Z86 19)   6  History of Inguinal hernia (550 90) (K40 90)   7  History of Left inguinal hernia (550 90) (K40 90)   8  History of Podagra (274 01) (M10 9)  Active Problems And Past Medical History Reviewed: The active problems and past medical history were reviewed and updated today        Surgical History   1  History of Complete Colonoscopy   2  History of Inguinal Hernia Repair   3  History of Inguinal Hernia Repair - Recurrent   4  History of Palmar Fasciotomy For Dupuytren's Contracture   5  History of Total Hip Replacement  Surgical History Reviewed: The surgical history was reviewed and updated today  Family History   Mother    1  Family history of Coronary artery disease  Father    2  Family history of Cancer  Family History Reviewed: The family history was reviewed and updated today  Social History    · Former smoker (M24 66) (G63 592)   ·    · Never a smoker   · Retired  Social History Reviewed: The social history was reviewed and is unchanged  Current Meds    1  Acetaminophen 325 MG Oral Tablet; Take 1-2 every 4-6 hours as needed; Therapy: 40WNN2366 to (Last Rx:04Lzb1545) Ordered   2  Allopurinol 300 MG Oral Tablet; Take 1 tablet daily; Therapy: 07DOT1684 to (Evaluate:15Apr2018)  Requested for: 77AHG6741; Last     Rx:05Uuc6838 Ordered   3  Alpha Lipoic Acid CAPS; Therapy: (Danielle Tellez) to Recorded   4  Gabapentin 300 MG Oral Capsule; TAKE 1 CAPSULE Bedtime; Therapy: 25FNW4781 to (Last Rx:05Dec2014) Ordered   5  Losartan Potassium-HCTZ 100-25 MG Oral Tablet; take 1 tablet once daily; Therapy: 10LOB4815 to (QEIWYVUQ:68HAX9372)  Requested for: 46ZLO4253; Last     Rx:16Jan2017 Ordered   6  MetroNIDAZOLE 1 % External Gel; APPLY QHS TO AFFECTED SKIN;     Therapy: 09XYN1105 to (Last Rx:15Mar2017)  Requested for: 84TXP6225 Ordered   7  Naproxen 250 MG Oral Tablet; TAKE 1 TABLET EVERY 12 HOURS AS NEEDED; Therapy: 10JKZ5325 to (Evaluate:29Xnc0038); Last Rx:12Jan2017 Ordered   8  Omeprazole 20 MG Oral Tablet Delayed Release; take 1 tablet daily prn; Therapy: 82VDA1607 to (Evaluate:17Jun2018) Recorded   9  RaNITidine HCl - 150 MG Oral Tablet; TAKE 1 TABLET DAILY AS NEEDED; Therapy: 82KJO1981 to (Evaluate:07Jan2018);  Last Rx:12Jan2017 Ordered 10  Rapaflo 4 MG Oral Capsule; take 1 capsule daily; Therapy: 81Ipz8671 to (Evaluate:19Oct2018)  Requested for: 24Oct2017; Last      Rx:24Oct2017 Ordered   11  Travatan Z 0 004 % Ophthalmic Solution; INSTILL 1 DROP Once; Therapy: 64WUV4931 to (Last Rx:12Jan2017) Ordered  Medication List Reviewed: The medication list was reviewed and updated today  Allergies   1  No Known Drug Allergies    Vitals   Vital Signs    Recorded: 65RJC5424 10:53AM   Heart Rate 74   Systolic 795   Diastolic 80   Height 5 ft 7 in   Weight 166 lb    BMI Calculated 26   BSA Calculated 1 87     Physical Exam        Constitutional      General appearance: No acute distress, well appearing and well nourished  Pulmonary      Respiratory effort: No increased work of breathing or signs of respiratory distress  Cardiovascular      Examination of extremities for edema and/or varicosities: Normal        Genitourinary 35g, smooth, symmetric, no nodules  Anus, perineum, and rectum: Normal        Musculoskeletal      Gait and station: Normal        Skin      Skin and subcutaneous tissue: Normal without rashes or lesions  Additional Exam:  no focal neurologic deficits  Mood and affect appropriate        Results/Data   AUA Symptom Score 04LCP9516 10:55AM User, s      Test Name Result Flag Reference   AUA Symptom Score (for prostate disease) 9     Incomplete emptying: Not at all (0)     Frequency: Almost always (5)     Intermittency: Less than 1 time in 5 (1)     Urgency: Less than 1 time in 5 (1)     Weak-stream: Not at all (0)     Straining: Not at all (0)     Nocturia: Less than half the time (2)   AUA Symptom Score (for prostate disease) - Quality of Life Due to Urinary Symptoms Mostly satisfied     AUA Symptom Score (for prostate disease) - Score Category Moderate        (1) PSA (SCREEN) (Dx V76 44 Screen for Prostate Cancer) 26Oct2017 02:01PM [facility] Number: [id number]      Test Name Result Flag Reference   PROSTATE SPECIFIC ANTIGEN 2 0 ng/mL  0 0-4 0   American Urological Association Guidelines define biochemical recurrence of prostate cancer as a detectable or rising PSA value post-radical prostatectomy that is greater than or equal to 0 2 ng/mL with a second confirmatory level of greater than or equal to 0 2 ng/mL  * XR ABDOMEN 1 VIEW KUB 37KGF4918 01:47PM Remington GUEVARA Order Number: [id number]      Test Name Result Flag Reference   XR ABDOMEN 1 VIEW KUB (Report)     ABDOMEN           INDICATION: 82-year-old male, renal calculi, follow-up           COMPARISON: 3/8/2016 x-rays           VIEWS: AP supine           IMAGES: 2           FINDINGS:      Several small calcifications project over the upper, middle and lower pole regions of the left kidney  The largest involves the region of the midpole measuring approximately 4 mm  5 mm calcification projects over the upper pole of the right kidney  Findings appear unchanged  There is a nonobstructive bowel gas pattern  No discernible free air on this supine study  Upright or left lateral decubitus imaging is more sensitive to detect subtle free air in the appropriate setting  No pathologic soft tissue masses  Visualized lung bases are clear  Typical partially visualized right hip arthroplasty, unchanged  Surgical closures in the right inguinal region  Surgical clips left inguinal region                  IMPRESSION:           Persistent bilateral renal calculi measuring up to approximately 4 mm on the left and 5 mm on the right                Workstation performed: [id number]           Signed by:      Isac Mcdaniel MD      10/30/17      Signatures    Electronically signed by : Lauren Flynn, ; Jan 9 2018 12:37PM EST                       (Author)     Electronically signed by : LIEN Roman ; Timoteo 10 2018 12:24PM EST

## 2018-01-13 VITALS
DIASTOLIC BLOOD PRESSURE: 78 MMHG | HEART RATE: 73 BPM | SYSTOLIC BLOOD PRESSURE: 118 MMHG | HEIGHT: 67 IN | WEIGHT: 157 LBS | BODY MASS INDEX: 24.64 KG/M2 | OXYGEN SATURATION: 98 %

## 2018-01-14 VITALS
HEART RATE: 75 BPM | DIASTOLIC BLOOD PRESSURE: 68 MMHG | BODY MASS INDEX: 25.29 KG/M2 | HEIGHT: 67 IN | OXYGEN SATURATION: 96 % | SYSTOLIC BLOOD PRESSURE: 118 MMHG | WEIGHT: 161.13 LBS

## 2018-01-14 VITALS
DIASTOLIC BLOOD PRESSURE: 92 MMHG | BODY MASS INDEX: 25.12 KG/M2 | HEART RATE: 84 BPM | RESPIRATION RATE: 16 BRPM | WEIGHT: 160.03 LBS | HEIGHT: 67 IN | SYSTOLIC BLOOD PRESSURE: 160 MMHG | TEMPERATURE: 98.6 F

## 2018-01-15 VITALS
SYSTOLIC BLOOD PRESSURE: 100 MMHG | HEIGHT: 67 IN | OXYGEN SATURATION: 97 % | HEART RATE: 77 BPM | WEIGHT: 160.13 LBS | DIASTOLIC BLOOD PRESSURE: 64 MMHG | BODY MASS INDEX: 25.13 KG/M2

## 2018-01-22 VITALS
BODY MASS INDEX: 26.06 KG/M2 | WEIGHT: 166 LBS | SYSTOLIC BLOOD PRESSURE: 122 MMHG | HEART RATE: 74 BPM | DIASTOLIC BLOOD PRESSURE: 80 MMHG | HEIGHT: 67 IN

## 2018-01-22 VITALS
BODY MASS INDEX: 25.13 KG/M2 | HEART RATE: 77 BPM | OXYGEN SATURATION: 96 % | HEIGHT: 67 IN | SYSTOLIC BLOOD PRESSURE: 118 MMHG | DIASTOLIC BLOOD PRESSURE: 62 MMHG | WEIGHT: 160.13 LBS

## 2018-01-23 NOTE — MISCELLANEOUS
Assessment   1  Lyme disease (088 81) (A69 20)1   2  1,2      1 Amended By: Marquise Barrow; Jul 10 2017 10:07 AM EST   2 Amended By: Marquise Barrow; Jul 10 2017 10:07 AM EST    Discussion/Summary  Discussion Summary:   Sebastian Carter appears to be responding well to well tolerated treatment for Lyme disease with doxycycline 100 mg twice daily for 14 days started on June 30  He will finish this prescription  Follow-up will be as needed  Prevention of tick exposure was discussed  I encouraged him to call for any questions or problems  Chronic medical problem for also well controlled  Follow-up will be in January 2018 for annual examination  1        1 Amended By: Marquise Barrow; Jul 10 2017 11:11 AM EST    Chief Complaint  Chief Complaint Free Text Note Form: pt is here for jerson follow up1        1 Amended By: Bryce Murphy; Jul 10 2017 9:49 AM EST    History of Present Illness  TCM Communication [facility]: The patient is being contacted for follow-up after hospitalization  He was [facility] at [facility]  The dates of hospitalization: 06/30/2017, date of discharge: 07/01/2017  Diagnosis: ACUTE LYMES DISEASE  He was discharged to home  Medications were not reviewed today  He scheduled a follow up appointment  Counseling was provided to the patient  Communication performed and completed by Feliberto Barger   HPI: Sebastian Carter is here today for a post hospital visit  He was admitted briefly from June 30 to July 1 for acute Lyme disease characterized by an year their mom migrans rash of the posterior right leg which was initially thought to be a cellulitis      We reviewed his hospital stay including infectious disease consultation, initiation of 14 days of doxycycline 100 mg twice daily, his good compliance with treatment, his questions about the medication, potential side effects, questions about Lyme disease, understanding that this treatment will be curative, and we also discussed that the concept of chronic Lyme disease has been disproven  He is feeling well and tolerating doxycycline well  He reports that the swelling, discomfort and redness of the right posterior thigh is resolving  The initial fever 101, chills and riders also resolve soon after initiating treatment  We discussed prevention of tick bites in the future  This includes using a DEET -containing insect repellent on skin clothing, wearing a hat, wearing long sleeve clothing that covers his skin when reasonable, inspecting his skin for tick exposure  He is agreeable to calling for any questions or problems  He was wondering about the lab work results for Lyme disease ordered during his hospital stay  He is going to contact Dr Mir Valencia of infectious disease who was his infectious disease consultant during his hospital stay  Also, elevated blood pressure hospital has resolved and today's blood pressure 118/62 his optimal on losartan /25 for hypertension  He continues treatment for prevention of gout, active treatment with topical metronidazole for rosacea, and has had no episodes of nephrolithiasis or recurrent prostatitis for which she follows up with urology  He will be returning next in mid January 2018 for his annual examination  1        1 Amended By: Theodore Small; Jul 10 2017 11:08 AM EST    Active Problems   1  Abscess of right thigh (682 6) (L02 415)  2  Chronic prostatitis (601 1) (N41 1)  3  Diverticulosis (562 10) (K57 90)  4  GERD (gastroesophageal reflux disease) (530 81) (K21 9)  5  Glaucoma (365 9) (H40 9)  6  Gout (274 9) (M10 9)  7  Hyperlipidemia (272 4) (E78 5)  8  Hypertension (401 9) (I10)  9  Infected sebaceous cyst (706 2) (L72 3,L08 9)  10  Nephrolithiasis (592 0) (N20 0)  11  Osteoarthritis (715 90) (M19 90)  12  Peripheral neuropathy (356 9) (G62 9)  13  Rosacea (695 3) (L71 9)  14  Screening for genitourinary condition (V81 6) (Z13 89)  15  Seborrheic keratosis (702 19) (L82 1)  16   Status post laparoscopic hernia repair (V45 89) (Z98 890,Z87 19)  17  Therapy failure due to antibiotic resistance (V09 80) (Z16 20)    Past Medical History   1  History of Acute bacterial prostatitis (601 0) (N41 0)  2  History of Benign colon polyp (211 3) (K63 5)  3  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)  4  History of bilateral inguinal hernias (V45 89) (Z98 890,Z87 19)  5  History of Inguinal hernia (550 90) (K40 90)  6  History of Left inguinal hernia (550 90) (K40 90)  7  History of Podagra (274 01) (M10 9)    Surgical History   1  History of Complete Colonoscopy  2  History of Inguinal Hernia Repair  3  History of Inguinal Hernia Repair - Recurrent  4  History of Palmar Fasciotomy For Dupuytren's Contracture  5  History of Total Hip Replacement    Family History  Mother   1  Family history of Coronary artery disease  Father   2  Family history of Cancer    Social History    · Former smoker (L10 89) (X60 369)   ·    · Never a smoker   · Retired    Current Meds  1  Acetaminophen 325 MG Oral Tablet; Take 1-2 every 4-6 hours as needed; Therapy: 52FME5665 to (Last Rx:05Hfu9554) Ordered  2  Allopurinol 300 MG Oral Tablet; Take 1 tablet daily; Therapy: 25GZA8153 to (Evaluate:70Ark5612)  Requested for: 67Djd9623; Last   Rx:71Exp4117 Ordered  3  Alpha Lipoic Acid CAPS; Therapy: (Shaw Oar) to Recorded  4  Gabapentin 300 MG Oral Capsule; TAKE 1 CAPSULE Bedtime; Therapy: 24XIV2518 to (Last Rx:78Lbk5083) Ordered  5  Losartan Potassium-HCTZ 100-25 MG Oral Tablet; take 1 tablet once daily; Therapy: 48KDX3241 to (GOHLFBAA:65DKA5438)  Requested for: 81NKO4437; Last   Rx:16Jan2017 Ordered  6  MetroNIDAZOLE 1 % External Gel; APPLY QHS TO AFFECTED SKIN;   Therapy: 30SMY0109 to (Last Rx:15Mar2017)  Requested for: 31CJZ9646 Ordered  7  Naproxen 250 MG Oral Tablet; TAKE 1 TABLET EVERY 12 HOURS AS NEEDED; Therapy: 95XVP8654 to (Evaluate:64Hyy8098); Last Rx:12Jan2017 Ordered  8   Omeprazole 20 MG Oral Tablet Delayed Release; take 1 tablet daily prn; Therapy: 20EIX5457 to (Evaluate:17Jun2018) Recorded  9  RaNITidine HCl - 150 MG Oral Tablet; TAKE 1 TABLET DAILY AS NEEDED; Therapy: 10LMB8381 to (Evaluate:07Jan2018); Last Rx:12Jan2017 Ordered  10  Rapaflo 4 MG Oral Capsule; take 1 capsule daily with food; Therapy: 91Rxr5588 to [phone] [phone])  Requested for: 23BOX0209; Last    Rx:27Mar2017 Ordered  11  Sulfamethoxazole-Trimethoprim 800-160 MG Oral Tablet; TAKE 1 TABLET TWICE DAILY    UNTIL FINISHED; Therapy: 79PSC0354 to (Evaluate:29Jun2017)  Requested for: 50WHL7418; Last    Rx:22Jun2017 Ordered  12  Travatan Z 0 004 % Ophthalmic Solution; INSTILL 1 DROP Once; Therapy: 30MVD5950 to (Last Rx:12Jan2017) Ordered    Allergies   1  No Known Drug Allergies    Physical Exam    Constitutional1  Vital signs stable and afebrile  He is in no distress  He is in good spirits  There is a healing central eschar of the right posterior upper thigh region strata by mildly indurated mildly red cutaneous and subcutaneous tissues, no cellulitis or lymphangitis  There is no other rash noted  There is no synovitis  There are no neurologic deficits and cognitive status is normal  Lungs are clear and cardiac exam is normal  There are no tophi  Rosacea of the face is unchanged  1           1 Amended By: Cely Barajas; Jul 10 2017 11:10 AM EST    Health Management  Health Maintenance   Medicare Annual Wellness Visit; every 1 year; Next Due: 95VFG7020; Overdue    Future Appointments    Date/Time Provider Specialty Site   07/10/2017 10:20 AM LIEN Zurita   Internal Medicine [facility]     Signatures   Electronically signed by : LIEN Vega ; Jul  3 2017 12:49PM EST                       (Author)    Electronically signed by : LIEN Vega ; Jul 10 2017 11:11AM EST                       (Author)

## 2018-01-23 NOTE — PROGRESS NOTES
Assessment   1  Encounter for preventive health examination (V70 0) (Z00 00)  2  Acute bacterial prostatitis (601 0) (N41 0)  3  Chronic prostatitis (601 1) (N41 1)  4  Inguinal hernia (550 90) (K40 90)    Plan  Acute bacterial prostatitis    · Stop: Ciprofloxacin HCl - 500 MG Oral Tablet (Cipro)   · Renew: Ciprofloxacin HCl - 500 MG Oral Tablet (Cipro); TAKE 1 TABLET EVERY 12  HOURS DAILY  GERD (gastroesophageal reflux disease)    · Stop: Omeprazole 20 MG Oral Tablet Delayed Release   · Renew: RaNITidine HCl - 150 MG Oral Tablet; TAKE 1 TABLET DAILY AS NEEDED  Glaucoma    · Start: Travatan Z 0 004 % Ophthalmic Solution; INSTILL 1 DROP Once  Gout    · (1) CBC/PLT/DIFF; Status:Active; Requested QZC:[id number];    · (1) URIC ACID; Status:Active; Requested GVC:[id number]; Health Maintenance    · Begin a limited exercise program ; Status:Complete - Retrospective By Protocol  Authorization;   Done: 10FEZ6505  Hyperlipidemia    · (1) LIPID PANEL FASTING W DIRECT LDL REFLEX; Status:Active; Requested  HNB:[id number];   Hypertension    · (1) COMPREHENSIVE METABOLIC PANEL; Status:Active; Requested PEH:[id number];    · (1) URINALYSIS (will reflex a microscopy if leukocytes, occult blood, protein or nitrites  are not within normal limits); Status:Active; Requested XZT:[id number];   Inguinal hernia    · 1 - Martin Vogel MD, Rashi Fitch  (General Surgery) Physician Referral  Evaluation and Treatment:  the expectation is that the referred to provider will communicate back to the patient on  treatment options  Status: Active - Retrospective By Protocol Authorization  Requested  for: [id number]  () Care Summary provided  : Yes  Osteoarthritis    · Renew: Naproxen 250 MG Oral Tablet; TAKE 1 TABLET EVERY 12 HOURS AS NEEDED    Discussion/Summary    Prevention: Luciano Mak had a colonoscopy in 2014 and EGD at that time for chronic GERD  One polyp was removed and he is on a 5 year recall list  He has had previous Prevnar and Pneumovax and Zostavax  He sees ophthalmology regularly with glaucoma of left eye, severe stigmatism and nearsightedness and floaters  He had his annual skin exam today with no skin malignancy noted and are stable seborrheic keratosis of the back and rosacea is well managed with metronidazole cream  He continues long-term follow-up with urology for chronic prostatitis with episodes of acute prostatitis as well as use appointment with his urologist and the summer and PSA as performed by his urology group  He was given a slip for lab work for surveillance regarding chronic medical conditions  He will continue see his neurologist, Dr Felicia Bey, for idiopathic peripheral neuropathy and is considering acupuncture therapy  He is protecting his feet from trauma  He continues preventive uric acid therapy for history of gout with no gallop flares since his single episodes or more years ago  His diet and weight management are very good and he is careful about his diet and exercises 45 minutes a day, and this controls his hypertension on minimal medication  We discussed symptomatic management of acute on chronic prostatitis with Cipro for 10 days and that he should notify his urologist when he needs to do this  Hyperlipidemia continues to be managed through diet  Check lipid panel  He is staying well-hydrated and was reminded to continue to do this to avoid recurrent nephrolithiasis  He was sent to general surgery as he is ready to proceed with left inguinal herniorrhaphy  Osteoarthritis is most back and right hip, and he takes acetaminophen, does have some secondary compensatory muscular symptoms in the same area, which seem to be causing chronic groin pull strain symptoms  There is no evidence of hernia today but I did ask him to have his general surgeon check this area as well  Regular follow-up will continue and I will see him again in 6 months  Impression: Subsequent Annual Wellness Visit        Chief Complaint  pt is here for annual physical exam      History of Present Illness  HPI: Lei Daly is here today for his annual preventive exam  He is recovering from grief after his wife's death 2 years ago from cancer  He has good social supports and is spending time traveling including visiting relatives out of state  He called recently because of recurrent acute prostatitis symptoms and reports slow decline in symptoms since starting Cipro  He is no fever or chills or sweating  He is actively followed by urology and we reviewed recent notes  He is seeing ophthalmology rectory, have his skin exam during this visit today, and is up-to-date on EGD and colonoscopy screening  He reports stable eye pressures with underlying glaucoma of left eye on current treatment  His medical questionnaire was reviewed today  Welcome to [employer] and Wellness Visits:   Medicare Screening and Risk Factors1    Hospitalizations:1  no previous hospitalizations1   Medicare Screening Tests Risk Questions   Abdominal aortic aneurysm risk assessment: over 72years of age2   Osteoporosis risk assessment:1  none indicated1   HIV risk assessment:1  none indicated1   Drug and Alcohol Use:1  The patient  has never smoked cigarettes1  1   The patient reports  drinking 1 drinks per day1  1   Alcohol concern: 1   The patient has no concerns about alcohol abuse1   He  has never used illicit drugs1  1   Diet and Physical Activity:1  Current diet includes  well balanced meals1  ,  low salt food choices1  ,  1 servings of fruit per day1  ,  1-2 servings of vegetables per day1  ,  1 servings of meat per day1  ,  1 servings of whole grains per day1  ,  1 servings of dairy products per day1  ,  1 cups of coffee per day1  ,  1 cups of tea per day1  and  1 cans of diet soda per day1  1   He  exercises daily1  1   Exercise:1  45 minutes per day1      Mood Disorder and Cognitive Impairment Screenin  He denies feeling down, depressed, or hopeless over the past two weeks1   He denies feeling little interest or pleasure in doing things over the past two weeks1   Cognitive impairment screenin  denies difficulty learning/retaining new information1 , denies difficulty handling complex tasks1 , denies difficulty with reasoning1 , denies difficulty with spatial ability and orientation1 , denies difficulty with language1  and denies difficulty with behavior1   Functional Ability/Level of Safety:1  Hearing is1  normal bilaterally1  and a hearing aid is not used1   He denies hearing difficulties1  The patient is currently1  able to do activities of daily living without limitations1 , able to do instrumental activities of daily living without limitations1 , able to participate in social activities without limitations1  and able to drive without limitations1   Fall risk factors: 1  The patient fell 0 times in the past 12 months  1   Home safety risk factors: 1  no unfamiliar surroundings1 , no loose rugs1 , no poor household lighting1 , no uneven floors1 , no household clutter1 , grab bars in the bathroom1  and handrails on the stairs1   Advance Directives:1  Advance directives: 1  living will1 , durable power of  for health care directives1  and advance directives1   Co-Managers and Medical Equipment/Suppliers: See Patient Care Team   Preventive Quality Program 65 and Older: The patient currently has no urinary incontinence symptoms1   Date of last glaucoma screen was1          1 Amended By: Katharina Corley; 2017 11:04 AM EST    Patient Care Team    Care Team Member Role Specialty Office Number   Yevgeniy BERNAL  Urology [phone]   [facility]  Nurse Practitioner [phone]     Review of Systems    Constitutional: negative  Head and Face: negative  Eyes: negative  ENT: negative  Cardiovascular: negative  Respiratory: negative  Gastrointestinal: negative     Genitourinary: dysuria, urinary frequency, urinary urgency, urinary hesitancy and nocturia, but no urinary incontinence, no hematuria, no pelvic pain and no testicular pain  Musculoskeletal: joint stiffness and pain in other joints, but no diffuse joint pain, no generalized muscle aches, no back pain, no joint swelling, no back muscle spasm and no limping  Integumentary and Breasts: skin lesion, but no rashes  Neurological: No change in chronic numbness of the feet  Otherwise negative  Psychiatric: negative  Endocrine: negative  Hematologic and Lymphatic: negative  Active Problems   1  Acute bacterial prostatitis (601 0) (N41 0)  2  Diverticulosis (562 10) (K57 90)  3  GERD (gastroesophageal reflux disease) (530 81) (K21 9)  4  Gout (274 9) (M10 9)  5  Hyperlipidemia (272 4) (E78 5)  6  Hypertension (401 9) (I10)  7  Inguinal hernia (550 90) (K40 90)  8  Nephrolithiasis (592 0) (N20 0)  9  Osteoarthritis (715 90) (M19 90)  10  Peripheral neuropathy (356 9) (G62 9)  11  Rosacea (695 3) (L71 9)  12  Seborrheic keratosis (702 19) (L82 1)    Past Medical History    · History of Benign colon polyp (211 3) (K63 5)   · 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)   · History of Podagra (274 01) (M10 9)    The active problems and past medical history were reviewed and updated today  Surgical History    · History of Complete Colonoscopy   · History of Inguinal Hernia Repair   · History of Palmar Fasciotomy For Dupuytren's Contracture   · History of Total Hip Replacement    The surgical history was reviewed and updated today  Family History  Mother    · Family history of Coronary artery disease  Father    · Family history of Cancer    The family history was reviewed and updated today  Social History    ·    · Never a smoker   · Retired  The social history was reviewed and updated today  The social history was reviewed and is unchanged  Current Meds  1  Acetaminophen 325 MG Oral Tablet;  Take 1-2 every 4-6 hours as needed; Therapy: 81CAY9940 to (Last Rx:06Mal8971) Ordered  2  Allopurinol 300 MG Oral Tablet; Take 1 tablet daily; Therapy: 02JCF3939 to (Evaluate:81Zsv5258)  Requested for: 00Odx5595; Last   Rx:94Rgk8205 Ordered  3  Gabapentin 300 MG Oral Capsule; TAKE 1 CAPSULE Bedtime; Therapy: 51SCT0163 to (Last Rx:92Sxx8329) Ordered  4  Losartan Potassium-HCTZ 100-25 MG Oral Tablet; take 1 tablet once daily; Therapy: 82ASX9890 to (Evaluate:16Jan2017)  Requested for: 03NIK4157; Last   Rx:22Jan2016 Ordered  5  MetroNIDAZOLE 1 % External Gel; APPLY QHS TO AFFECTED SKIN;   Therapy: 04OUA9010 to (Last Rx:18Jun2015)  Requested for: 36XDR6544 Ordered  6  Naproxen 250 MG Oral Tablet; TAKE 1 TABLET EVERY 12 HOURS AS NEEDED; Therapy: 29UJW8200 to [facility]QJ[facility]); Last Rx:57Uxa4432 Ordered  7  Omeprazole 20 MG Oral Tablet Delayed Release; take 1 tablet daily prn; Therapy: 21XJW4243 to (ZWKZZBUZ:87RRW0503); Last Rx:65Nqs7116 Ordered  8  ProAir  (90 Base) MCG/ACT Inhalation Aerosol Solution; INHALE 2 PUFFS   EVERY 4 HOURS AS NEEDED; Therapy: 42BDL4626 to (Last Tim Fonder)  Requested for: 56TKR9636 Ordered  9  RaNITidine HCl - 150 MG Oral Tablet; TAKE 1 TABLET DAILY AS NEEDED; Therapy: 58CNB5821 to (Evaluate:30Nov2015); Last Rx:12Xcr8623 Ordered  10  Rapaflo 4 MG Oral Capsule; TAKE 1 CAPSULE DAILY WITH FOOD; Therapy: 12Rpv0247 to (Evaluate:99Soh6398)  Requested for: 79Cbw6723; Last    Rx:69Thn9763 Ordered    The medication list was reviewed and updated today  Allergies   1  No Known Drug Allergies    Immunizations   1    Influenza  28-Oct-2015    PCV  18-Jun-2015     Vitals  Signs    Heart Rate: 73  Systolic: 180  Diastolic: 78  Height: 5 ft 7 in  Weight: 157 lb   BMI Calculated: 24 59  BSA Calculated: 1 82  O2 Saturation: 98    Physical Exam    Constitutional Very pleasant gentleman, well signs stable  Her pulse, in no acute distress   Skin notable for mild rosacea of the face, a respiratory circular area seborrheic keratosis of the mid back, no evidence of malignancy  There are scattered hemangiomas and small benign nevi  There are no gross neurologic deficits, affect normal and neurologic exam is unchanged with relative anesthesia and feet, otherwise unremarkable  Peripheral circulation intact including 2 over 2 radial brachial popliteal posterior tibial and dorsalis pedis pulses bilaterally  HEENT exam: Normal for age and he is wearing glasses with grossly normal vision and hearing  Head and neck without mass or adenopathy  Neck supple and nontender  There is no JVD  Thyroid exam is normal on palpation and inspection  There is no supraclavicular, periumbilical, or inguinal adenopathy  Lungs are clear throughout and normal to percussion posteriorly  There is no kyphosis or scoliosis  Cardiac: Regular rate and rhythm, normal S1 and S2, no murmur, no S4 S3  Abdomen: Nondistended with normal bowel sounds, nontender and soft without masses bruits or organomegaly  Perineum: There is an obvious left inguinal mass, easily reducible, consistent with hernia of the inguinal canal  No hernias evident on exam of the right groin  Joints: No active joint inflammation, with bunion deformities of both first MTP joints, with a chronic ptosis of the right great toe  No new tophi are evident  Gait is stable  He has degenerative changes in the hands and hips, to lesser degree the knees  Strength and balance are preserved  Health Management  Health Maintenance   Medicare Annual Wellness Visit; every 1 year; Next Due: 78XIQ2380;  Overdue    Signatures   Electronically signed by : LIEN Quintero ; Jan 12 2017 11:12AM EST                       (Author)

## 2018-04-10 DIAGNOSIS — M10.9 GOUT: Primary | ICD-10-CM

## 2018-04-10 RX ORDER — ALLOPURINOL 300 MG/1
TABLET ORAL
Qty: 90 TABLET | Refills: 2 | Status: SHIPPED | OUTPATIENT
Start: 2018-04-10 | End: 2019-01-07 | Stop reason: SDUPTHER

## 2018-06-08 DIAGNOSIS — L71.9 ROSACEA: ICD-10-CM

## 2018-06-08 DIAGNOSIS — L71.9 ROSACEA: Primary | ICD-10-CM

## 2018-06-08 RX ORDER — METRONIDAZOLE 10 MG/G
GEL TOPICAL
Qty: 60 G | Refills: 2 | Status: SHIPPED | OUTPATIENT
Start: 2018-06-08 | End: 2018-06-08 | Stop reason: SDUPTHER

## 2018-06-08 RX ORDER — METRONIDAZOLE 10 MG/G
GEL TOPICAL
Qty: 60 G | Refills: 0 | Status: SHIPPED | OUTPATIENT
Start: 2018-06-08 | End: 2019-02-22 | Stop reason: ALTCHOICE

## 2018-08-06 ENCOUNTER — OFFICE VISIT (OUTPATIENT)
Dept: INTERNAL MEDICINE CLINIC | Facility: CLINIC | Age: 73
End: 2018-08-06
Payer: COMMERCIAL

## 2018-08-06 ENCOUNTER — HOSPITAL ENCOUNTER (OUTPATIENT)
Dept: RADIOLOGY | Facility: HOSPITAL | Age: 73
Discharge: HOME | End: 2018-08-06
Payer: COMMERCIAL

## 2018-08-06 ENCOUNTER — APPOINTMENT (OUTPATIENT)
Dept: LAB | Facility: HOSPITAL | Age: 73
End: 2018-08-06
Payer: COMMERCIAL

## 2018-08-06 VITALS
HEIGHT: 67 IN | HEART RATE: 58 BPM | TEMPERATURE: 98.4 F | WEIGHT: 165 LBS | SYSTOLIC BLOOD PRESSURE: 172 MMHG | BODY MASS INDEX: 25.9 KG/M2 | OXYGEN SATURATION: 96 % | DIASTOLIC BLOOD PRESSURE: 98 MMHG

## 2018-08-06 DIAGNOSIS — M54.30 SCIATIC LEG PAIN: ICD-10-CM

## 2018-08-06 DIAGNOSIS — M54.30 SCIATIC LEG PAIN: Primary | ICD-10-CM

## 2018-08-06 DIAGNOSIS — M10.9 GOUT, UNSPECIFIED CAUSE, UNSPECIFIED CHRONICITY, UNSPECIFIED SITE: ICD-10-CM

## 2018-08-06 DIAGNOSIS — I10 ESSENTIAL HYPERTENSION: Chronic | ICD-10-CM

## 2018-08-06 PROBLEM — A69.20 LYME DISEASE: Status: RESOLVED | Noted: 2017-06-30 | Resolved: 2018-08-06

## 2018-08-06 PROBLEM — H40.9 GLAUCOMA: Status: ACTIVE | Noted: 2017-01-12

## 2018-08-06 LAB
ALBUMIN SERPL BCP-MCNC: 3.9 G/DL (ref 3.5–5)
ALP SERPL-CCNC: 51 U/L (ref 46–116)
ALT SERPL W P-5'-P-CCNC: 24 U/L (ref 12–78)
ANION GAP SERPL CALCULATED.3IONS-SCNC: 3 MMOL/L (ref 4–13)
AST SERPL W P-5'-P-CCNC: 18 U/L (ref 5–45)
BACTERIA UR QL AUTO: ABNORMAL /HPF
BASOPHILS # BLD AUTO: 0.03 THOUSANDS/ΜL (ref 0–0.1)
BASOPHILS NFR BLD AUTO: 1 % (ref 0–1)
BILIRUB SERPL-MCNC: 0.22 MG/DL (ref 0.2–1)
BILIRUB UR QL STRIP: NEGATIVE
BUN SERPL-MCNC: 19 MG/DL (ref 5–25)
CALCIUM SERPL-MCNC: 8.9 MG/DL (ref 8.3–10.1)
CHLORIDE SERPL-SCNC: 106 MMOL/L (ref 100–108)
CHOLEST SERPL-MCNC: 179 MG/DL (ref 50–200)
CLARITY UR: CLEAR
CO2 SERPL-SCNC: 31 MMOL/L (ref 21–32)
COLOR UR: YELLOW
CREAT SERPL-MCNC: 0.96 MG/DL (ref 0.6–1.3)
EOSINOPHIL # BLD AUTO: 0.2 THOUSAND/ΜL (ref 0–0.61)
EOSINOPHIL NFR BLD AUTO: 4 % (ref 0–6)
ERYTHROCYTE [DISTWIDTH] IN BLOOD BY AUTOMATED COUNT: 15.4 % (ref 11.6–15.1)
GFR SERPL CREATININE-BSD FRML MDRD: 79 ML/MIN/1.73SQ M
GLUCOSE SERPL-MCNC: 93 MG/DL (ref 65–140)
GLUCOSE UR STRIP-MCNC: NEGATIVE MG/DL
HCT VFR BLD AUTO: 38.5 % (ref 36.5–49.3)
HDLC SERPL-MCNC: 29 MG/DL (ref 40–60)
HGB BLD-MCNC: 11.8 G/DL (ref 12–17)
HGB UR QL STRIP.AUTO: ABNORMAL
KETONES UR STRIP-MCNC: NEGATIVE MG/DL
LDLC SERPL CALC-MCNC: 107 MG/DL (ref 0–100)
LEUKOCYTE ESTERASE UR QL STRIP: ABNORMAL
LYMPHOCYTES # BLD AUTO: 1.37 THOUSANDS/ΜL (ref 0.6–4.47)
LYMPHOCYTES NFR BLD AUTO: 25 % (ref 14–44)
MCH RBC QN AUTO: 24.4 PG (ref 26.8–34.3)
MCHC RBC AUTO-ENTMCNC: 30.6 G/DL (ref 31.4–37.4)
MCV RBC AUTO: 80 FL (ref 82–98)
MONOCYTES # BLD AUTO: 0.53 THOUSAND/ΜL (ref 0.17–1.22)
MONOCYTES NFR BLD AUTO: 10 % (ref 4–12)
MUCOUS THREADS UR QL AUTO: ABNORMAL
NEUTROPHILS # BLD AUTO: 3.42 THOUSANDS/ΜL (ref 1.85–7.62)
NEUTS SEG NFR BLD AUTO: 62 % (ref 43–75)
NITRITE UR QL STRIP: NEGATIVE
NON-SQ EPI CELLS URNS QL MICRO: ABNORMAL /HPF
NONHDLC SERPL-MCNC: 150 MG/DL
NRBC BLD AUTO-RTO: 0 /100 WBCS
PH UR STRIP.AUTO: 5.5 [PH] (ref 4.5–8)
PLATELET # BLD AUTO: 269 THOUSANDS/UL (ref 149–390)
PMV BLD AUTO: 10.9 FL (ref 8.9–12.7)
POTASSIUM SERPL-SCNC: 3.8 MMOL/L (ref 3.5–5.3)
PROT SERPL-MCNC: 7.4 G/DL (ref 6.4–8.2)
PROT UR STRIP-MCNC: NEGATIVE MG/DL
RBC # BLD AUTO: 4.84 MILLION/UL (ref 3.88–5.62)
RBC #/AREA URNS AUTO: ABNORMAL /HPF
SODIUM SERPL-SCNC: 140 MMOL/L (ref 136–145)
SP GR UR STRIP.AUTO: >=1.03 (ref 1–1.03)
TRIGL SERPL-MCNC: 215 MG/DL
URATE SERPL-MCNC: 3.6 MG/DL (ref 4.2–8)
UROBILINOGEN UR QL STRIP.AUTO: 0.2 E.U./DL
WBC # BLD AUTO: 5.55 THOUSAND/UL (ref 4.31–10.16)
WBC #/AREA URNS AUTO: ABNORMAL /HPF

## 2018-08-06 PROCEDURE — 80061 LIPID PANEL: CPT

## 2018-08-06 PROCEDURE — 85025 COMPLETE CBC W/AUTO DIFF WBC: CPT

## 2018-08-06 PROCEDURE — 81001 URINALYSIS AUTO W/SCOPE: CPT | Performed by: INTERNAL MEDICINE

## 2018-08-06 PROCEDURE — 3725F SCREEN DEPRESSION PERFORMED: CPT | Performed by: INTERNAL MEDICINE

## 2018-08-06 PROCEDURE — 84550 ASSAY OF BLOOD/URIC ACID: CPT

## 2018-08-06 PROCEDURE — 80053 COMPREHEN METABOLIC PANEL: CPT

## 2018-08-06 PROCEDURE — 72110 X-RAY EXAM L-2 SPINE 4/>VWS: CPT

## 2018-08-06 PROCEDURE — 36415 COLL VENOUS BLD VENIPUNCTURE: CPT

## 2018-08-06 PROCEDURE — 99214 OFFICE O/P EST MOD 30 MIN: CPT | Performed by: INTERNAL MEDICINE

## 2018-08-06 PROCEDURE — 1101F PT FALLS ASSESS-DOCD LE1/YR: CPT | Performed by: INTERNAL MEDICINE

## 2018-08-06 RX ORDER — TIMOLOL MALEATE 5 MG/ML
1 SOLUTION/ DROPS OPHTHALMIC 2 TIMES DAILY
Refills: 3 | COMMUNITY
Start: 2018-06-29

## 2018-08-06 NOTE — PROGRESS NOTES
Assessment/Plan   Problem List Items Addressed This Visit     Essential hypertension (Chronic)      Other Visit Diagnoses     Sciatic leg pain    -  Primary    Relevant Orders    XR spine lumbar minimum 4 views non injury    Comprehensive metabolic panel    CBC and differential    Lipid panel    UA w Reflex to Microscopic w Reflex to Culture    Gout, unspecified cause, unspecified chronicity, unspecified site        Relevant Orders    Uric acid      Accelerated hypertension with possible acute renal insufficiency to subacute renal insufficiency brought on by 2 months of NSAID use  Plan is discontinue NSAIDs, use Tylenol for pain, check appropriate lab work ordered today, compare with lab work ordered by Neurology 2 months ago, prior to onset of NSAID use, re-evaluate in 1 week and take blood pressures at home daily and bring in readings next week  His recent onset of symptoms without trauma suggest progressive arthritis of lumbar spine with right upper lumbar distribution sciatica with some pseudoclaudication  Plan is plain films then referred back to his orthopedic group for evaluation by physical medicine  Subjective   Patient ID: Omega Dong is a 72 y o  male  Welcome made appointment today regarding 2 months of back pain radiating to the right hip to the lateral leg just below the knee, occasionally just below, no weakness, no tingling or numbness, symptoms made worse by standing in 1 place or walking long distance  He can return from a trip to [place] was doing a lot of hiking which made his symptoms worse  There is no bowel or bladder dysfunction, no nocturnal symptoms, no fever chills or night sweats, anorexia weight loss  He has been taking large amounts of ibuprofen to relieve pain  This has only temporary effect  Associated with this issue is today's elevated blood pressure, and explained the possibility that NSAIDs or elevating blood pressure may be affecting kidney function    He does have underlying hypertension  He was last seen in July of last year for Lyme disease and has had no follow-up since that time  He does see Urology and Neurology and had cataract surgery since that time  He has had a right hip replacement in the past and this seems to be functioning well  He has some skin itching and exam today was unremarkable  He has about multi vitamins and read that these were not helpful for health and I agreed  He will discontinue them  He has about the shingles vaccine which is not yet available and plan is vaccinate when available  Vitals:    08/06/18 1515   BP: (!) 172/98   Pulse: 58   Temp: 98 4 °F (36 9 °C)   SpO2: 96%     HPI   Vital signs stable with regular pulse in no distress  There is full range of motion back without trauma or tenderness  Muscle tone of back is slightly increased  Gait is normal, there is no sensory or motor deficit of legs  Cardiac exam normal on lungs clear  Skin without rash or malignancy  There is no peripheral edema and there are no phlebitic findings      The following portions of the patient's history were reviewed and updated as appropriate: allergies, current medications, past family history, past medical history, past social history, past surgical history and problem list     Review of Systems    Objective   Physical Exam       Patient Active Problem List   Diagnosis    Essential hypertension    Prostatitis, chronic    History of total right hip arthroplasty    Hereditary and idiopathic peripheral neuropathy    Nephrolithiasis    Primary open angle glaucoma    History of abdominal hernia    Chronic gout    Gastroesophageal reflux disease without esophagitis    Rosacea    Diverticulosis    Glaucoma    Hyperlipidemia    Neuropathy of both feet    Osteoarthritis    Peripheral neuropathy     Current Outpatient Prescriptions:     acetaminophen (TYLENOL) 325 mg tablet, Take 650 mg by mouth every 6 (six) hours as needed for mild pain, Disp: , Rfl:     allopurinol (ZYLOPRIM) 300 mg tablet, TAKE 1 TABLET DAILY, Disp: 90 tablet, Rfl: 2    amoxicillin (AMOXIL) 500 mg capsule, Take 500 mg by mouth once as needed 4 caps before dental work, Disp: , Rfl:     gabapentin (NEURONTIN) 300 mg capsule, Take 300 mg by mouth daily at bedtime, Disp: , Rfl:     losartan-hydrochlorothiazide (HYZAAR) 100-25 MG per tablet, Take 1 tablet by mouth daily, Disp: , Rfl:     metroNIDAZOLE (METROGEL) 1 % gel, Apply at bedtime to affected skin, Disp: 60 g, Rfl: 0    Multiple Vitamin (MULTIVITAMIN) capsule, Take 1 capsule by mouth daily, Disp: , Rfl:     ranitidine (ZANTAC) 150 MG capsule, Take 150 mg by mouth daily at bedtime as needed for indigestion or heartburn, Disp: , Rfl:     Silodosin (RAPAFLO) 4 MG CAPS, Take 4 mg by mouth every morning  , Disp: , Rfl:     timolol (TIMOPTIC) 0 5 % ophthalmic solution, INSTILL 1 DROP INTO RIGHT EYE IN THE MORNING, Disp: , Rfl: 3    ALPHA LIPOIC ACID PO, Take 1 tablet by mouth daily, Disp: , Rfl:     naproxen (NAPROSYN) 250 mg tablet, Take 250 mg by mouth 2 (two) times a day as needed for mild pain, Disp: , Rfl:     omeprazole (PriLOSEC) 20 mg delayed release capsule, Take 20 mg by mouth daily, Disp: , Rfl:     Saw Palmetto, Serenoa repens, (SAW PALMETTO PO), Take 1 capsule by mouth 3 (three) times a day  , Disp: , Rfl:     travoprost (TRAVATAN Z) 0 004 % ophthalmic solution, Administer 1 drop to the right eye daily at bedtime, Disp: , Rfl:

## 2018-08-07 ENCOUNTER — TELEPHONE (OUTPATIENT)
Dept: INTERNAL MEDICINE CLINIC | Facility: CLINIC | Age: 73
End: 2018-08-07

## 2018-08-08 DIAGNOSIS — D64.9 ANEMIA, UNSPECIFIED TYPE: Primary | ICD-10-CM

## 2018-08-13 ENCOUNTER — OFFICE VISIT (OUTPATIENT)
Dept: INTERNAL MEDICINE CLINIC | Facility: CLINIC | Age: 73
End: 2018-08-13
Payer: COMMERCIAL

## 2018-08-13 VITALS
OXYGEN SATURATION: 98 % | HEIGHT: 67 IN | HEART RATE: 68 BPM | TEMPERATURE: 98.4 F | DIASTOLIC BLOOD PRESSURE: 92 MMHG | SYSTOLIC BLOOD PRESSURE: 166 MMHG | BODY MASS INDEX: 24.8 KG/M2 | WEIGHT: 158 LBS

## 2018-08-13 DIAGNOSIS — E78.5 DYSLIPIDEMIA: ICD-10-CM

## 2018-08-13 DIAGNOSIS — D50.9 MICROCYTIC ANEMIA: Primary | ICD-10-CM

## 2018-08-13 DIAGNOSIS — M47.26 OSTEOARTHRITIS OF SPINE WITH RADICULOPATHY, LUMBAR REGION: ICD-10-CM

## 2018-08-13 DIAGNOSIS — I10 ESSENTIAL HYPERTENSION: Chronic | ICD-10-CM

## 2018-08-13 PROCEDURE — 99213 OFFICE O/P EST LOW 20 MIN: CPT | Performed by: INTERNAL MEDICINE

## 2018-08-13 RX ORDER — ATORVASTATIN CALCIUM 10 MG/1
10 TABLET, FILM COATED ORAL DAILY
Qty: 90 TABLET | Refills: 1 | Status: SHIPPED | OUTPATIENT
Start: 2018-08-13 | End: 2018-11-12 | Stop reason: SDUPTHER

## 2018-08-13 NOTE — PROGRESS NOTES
Assessment/Plan   Problem List Items Addressed This Visit     Essential hypertension (Chronic)    Microcytic anemia - Primary    Dyslipidemia    Relevant Medications    atorvastatin (LIPITOR) 10 mg tablet    Osteoarthritis of spine with radiculopathy, lumbar region      Four microcytic anemia, Gastroenterology referral has already occurred and is scheduled for mid September  Nic Henson will continue PPI therapy and continues to avoid NSAIDs  He will not donate blood  He will complete H pylori stool testing and will be notified of results  He will call as needed  I will assess his progress through communication with Gastroenterology  He does have a remote history of upper GI bleeding from severe esophagitis and has been on PPI therapy for decades and has no heartburn at this time  Dyslipidemia:  His risk calculation is greater than 7 5% and we discussed goal lowering LDL cholesterol to below 80, given HDL 29, and atorvastatin 10 mg at bedtime was started and will continue his low-cholesterol diet and lipids will be recheck just before next visit which will be in 3 months  Hypertension with labile blood pressure: He will continual start HCT, home blood pressure readings reviewed at times are very well controlled another time slightly high  He is a bit nervous today this may be contributing to his elevated blood pressure today  Plan is for him to continue his recent endeavors to lose weight and follow a healthy diet, monitor blood pressure and call for high readings that might be sustained, and reassess in 3 months  Symptomatic arthritis of the lumbar spine with radiculopathy:  X-ray results reviewed and a follow-up appoint was arranged with his orthopedic group to see a sports medicine physician he is seen in the past, Dr Tiffanie Murphy done  I will follow his progress  Subjective   Patient ID: Lauren Arellano is a 72 y o  male  Nic Henson is here for 1 week follow-up visit  He is feeling well      Vitals: 08/13/18 1739   BP: 166/92   Pulse: 68   Temp: 98 4 °F (36 9 °C)   SpO2: 98%     HPI   Since last visit, he was contacted with results of his labs including mild anemia and slight microcytosis, with the possibility of gastrointestinal bleeding  He does donate blood occasionally but has never been anemic and last donated blood about 6 weeks ago  He was taking NSAIDs and they were stopped last visit, and he will continue to avoid NSAIDs  H pylori stool testing was also ordered and he has a container today will submit this soon  He has a history of brisk upper GI bleeding decades ago, due to esophagitis from uncontrolled GERD and has been on PPI therapy since that time  He continues to have no symptoms such as melena or blood per rectum or abdominal pain  He was referred to [facility] and has an appointment in mid September  Home blood pressure readings were reviewed and are in the 130s and 70s at times in the 150s and 90s  He is losing weight and wants to monitor his blood pressure, and we both agreed that we would observe rather than adding a third medicine for hypertensive control  He is compliant with his current combination of losartan and hydrochlorothiazide  We reviewed his lipid panel showing low HDL 29, normal total cholesterol and LDL was slightly above 100, and I explained the risk calculation, and that age and gender risk factors and HDL cholesterol in his range of 34 is an independent risk factor for heart disease  Did recommend statin therapy, and he is agreeable on atorvastatin was started, potential side effects precautions explained and will recheck lipids in 3 months  We reviewed his back x-ray results showing no untoward findings, with arthritis as expected  He continues to have right lower extremity distribution upper lumbar distribution radiculopathy  He does have an appoint with his orthopedic group to see a sports medicine doctor      Other labs reviewed as well including essentially normal CMP, unchanged urinalysis  The following portions of the patient's history were reviewed and updated as appropriate: allergies, current medications, past family history, past medical history, past social history, past surgical history and problem list     Review of Systems no abdominal pain, no melena or blood per rectum  Objective   Physical Exam   Vital signs stable and in no distress, here for purposes of discussion  Gait is stable normal today      Patient Active Problem List   Diagnosis    Essential hypertension    Prostatitis, chronic    History of total right hip arthroplasty    Hereditary and idiopathic peripheral neuropathy    Nephrolithiasis    Primary open angle glaucoma    History of abdominal hernia    Chronic gout    Gastroesophageal reflux disease without esophagitis    Rosacea    Diverticulosis    Glaucoma    Hyperlipidemia    Neuropathy of both feet    Osteoarthritis    Peripheral neuropathy    Microcytic anemia    Dyslipidemia    Osteoarthritis of spine with radiculopathy, lumbar region     Current Outpatient Prescriptions:     acetaminophen (TYLENOL) 325 mg tablet, Take 650 mg by mouth every 6 (six) hours as needed for mild pain, Disp: , Rfl:     allopurinol (ZYLOPRIM) 300 mg tablet, TAKE 1 TABLET DAILY, Disp: 90 tablet, Rfl: 2    ALPHA LIPOIC ACID PO, Take 1 tablet by mouth daily, Disp: , Rfl:     amoxicillin (AMOXIL) 500 mg capsule, Take 500 mg by mouth once as needed 4 caps before dental work, Disp: , Rfl:     gabapentin (NEURONTIN) 300 mg capsule, Take 300 mg by mouth daily at bedtime, Disp: , Rfl:     losartan-hydrochlorothiazide (HYZAAR) 100-25 MG per tablet, Take 1 tablet by mouth daily, Disp: , Rfl:     metroNIDAZOLE (METROGEL) 1 % gel, Apply at bedtime to affected skin, Disp: 60 g, Rfl: 0    Multiple Vitamin (MULTIVITAMIN) capsule, Take 1 capsule by mouth daily, Disp: , Rfl:     naproxen (NAPROSYN) 250 mg tablet, Take 250 mg by mouth 2 (two) times a day as needed for mild pain, Disp: , Rfl:     omeprazole (PriLOSEC) 20 mg delayed release capsule, Take 20 mg by mouth daily, Disp: , Rfl:     ranitidine (ZANTAC) 150 MG capsule, Take 150 mg by mouth daily at bedtime as needed for indigestion or heartburn, Disp: , Rfl:     Saw Palmetto, Serenoa repens, (SAW PALMETTO PO), Take 1 capsule by mouth 3 (three) times a day  , Disp: , Rfl:     Silodosin (RAPAFLO) 4 MG CAPS, Take 4 mg by mouth every morning  , Disp: , Rfl:     timolol (TIMOPTIC) 0 5 % ophthalmic solution, INSTILL 1 DROP INTO RIGHT EYE IN THE MORNING, Disp: , Rfl: 3    travoprost (TRAVATAN Z) 0 004 % ophthalmic solution, Administer 1 drop to the right eye daily at bedtime, Disp: , Rfl:     atorvastatin (LIPITOR) 10 mg tablet, Take 1 tablet (10 mg total) by mouth daily, Disp: 90 tablet, Rfl: 1

## 2018-08-13 NOTE — PATIENT INSTRUCTIONS
We will contact you with results of your H pylori stool sample  Please follow-up as planned with Gastroenterology  Please start atorvastatin call if there are any side effects  Please follow-up as planned in 3 months to monitor blood pressure and please call her blood pressure readings trend higher on sustained basis  He please call sooner for any problems

## 2018-08-22 ENCOUNTER — LAB (OUTPATIENT)
Dept: LAB | Facility: HOSPITAL | Age: 73
End: 2018-08-22
Payer: COMMERCIAL

## 2018-08-22 DIAGNOSIS — D64.9 ANEMIA, UNSPECIFIED TYPE: ICD-10-CM

## 2018-08-22 PROCEDURE — 87338 HPYLORI STOOL AG IA: CPT

## 2018-08-24 ENCOUNTER — TELEPHONE (OUTPATIENT)
Dept: INTERNAL MEDICINE CLINIC | Facility: CLINIC | Age: 73
End: 2018-08-24

## 2018-08-24 LAB — H PYLORI AG STL QL IA: NEGATIVE

## 2018-08-24 NOTE — TELEPHONE ENCOUNTER
----- Message from Carolina Eller MD sent at 8/24/2018  7:47 AM EDT -----  Please call Jose Lunsford: his H pylori stool test is negative

## 2018-09-10 ENCOUNTER — TRANSCRIBE ORDERS (OUTPATIENT)
Dept: ADMINISTRATIVE | Facility: HOSPITAL | Age: 73
End: 2018-09-10

## 2018-09-10 DIAGNOSIS — M54.40 ACUTE RIGHT-SIDED LOW BACK PAIN WITH SCIATICA, SCIATICA LATERALITY UNSPECIFIED: Primary | ICD-10-CM

## 2018-09-17 ENCOUNTER — HOSPITAL ENCOUNTER (OUTPATIENT)
Dept: MRI IMAGING | Facility: HOSPITAL | Age: 73
Discharge: HOME | End: 2018-09-17
Attending: PHYSICAL MEDICINE & REHABILITATION
Payer: COMMERCIAL

## 2018-09-17 DIAGNOSIS — M54.40 ACUTE RIGHT-SIDED LOW BACK PAIN WITH SCIATICA, SCIATICA LATERALITY UNSPECIFIED: ICD-10-CM

## 2018-09-17 PROCEDURE — 72148 MRI LUMBAR SPINE W/O DYE: CPT

## 2018-09-28 ENCOUNTER — OFFICE VISIT (OUTPATIENT)
Dept: GASTROENTEROLOGY | Facility: MEDICAL CENTER | Age: 73
End: 2018-09-28
Payer: COMMERCIAL

## 2018-09-28 ENCOUNTER — APPOINTMENT (OUTPATIENT)
Dept: LAB | Facility: MEDICAL CENTER | Age: 73
End: 2018-09-28
Attending: INTERNAL MEDICINE
Payer: COMMERCIAL

## 2018-09-28 VITALS
SYSTOLIC BLOOD PRESSURE: 160 MMHG | DIASTOLIC BLOOD PRESSURE: 90 MMHG | BODY MASS INDEX: 26.21 KG/M2 | HEIGHT: 67 IN | HEART RATE: 64 BPM | WEIGHT: 167 LBS | TEMPERATURE: 98.9 F

## 2018-09-28 DIAGNOSIS — D64.9 ANEMIA, UNSPECIFIED TYPE: ICD-10-CM

## 2018-09-28 DIAGNOSIS — K21.9 GASTROESOPHAGEAL REFLUX DISEASE WITHOUT ESOPHAGITIS: Primary | ICD-10-CM

## 2018-09-28 LAB
FERRITIN SERPL-MCNC: 7 NG/ML (ref 8–388)
FOLATE SERPL-MCNC: 18.2 NG/ML (ref 3.1–17.5)
IRON SATN MFR SERPL: 6 %
IRON SERPL-MCNC: 30 UG/DL (ref 65–175)
TIBC SERPL-MCNC: 474 UG/DL (ref 250–450)
VIT B12 SERPL-MCNC: 446 PG/ML (ref 100–900)

## 2018-09-28 PROCEDURE — 83550 IRON BINDING TEST: CPT

## 2018-09-28 PROCEDURE — 82746 ASSAY OF FOLIC ACID SERUM: CPT

## 2018-09-28 PROCEDURE — 82607 VITAMIN B-12: CPT

## 2018-09-28 PROCEDURE — 36415 COLL VENOUS BLD VENIPUNCTURE: CPT

## 2018-09-28 PROCEDURE — 83540 ASSAY OF IRON: CPT

## 2018-09-28 PROCEDURE — 99204 OFFICE O/P NEW MOD 45 MIN: CPT | Performed by: INTERNAL MEDICINE

## 2018-09-28 PROCEDURE — 82728 ASSAY OF FERRITIN: CPT

## 2018-09-28 NOTE — PATIENT INSTRUCTIONS
The patient is now scheduled on 10/31/18 at [place] [address] with Dr Cielo Tobias was gone over with by the MA

## 2018-09-28 NOTE — LETTER
September 28, 2018     Frida Fitzpatrick MD  [id number] [place] [address]    Patient: oLuie Crawford   YOB: 1945   Date of Visit: 9/28/2018       Dear Dr Jessica Gamble: Thank you for referring Louie Crawford to me for evaluation  Below are my notes for this consultation  If you have questions, please do not hesitate to call me  I look forward to following your patient along with you  Sincerely,        Sabina Barragan MD        CC: No Recipients  Naty Pineda, [address]  9/28/2018 11:10 AM  Sign at close encounter  [facility] - Outpatient Consultation  Louie Crawford 72 y o  male MRN: [id number]  Encounter: [id number]          ASSESSMENT AND PLAN:    Louie Crawford is a 72 y o  male who presents today, per referral by Dr Frida Fitzpatrick, in consultation regarding evaluation and management of anemia  1  Anemia  Recent Hgb on 8/6/18 was low at 11 8  He is currently asymptomatic  It is possible that he has anemia of chronic disease, vitamin insufficieny, or GI bleed  He does have inflammatory diseases including gout and arthritis possibly causing anemia of chronic disease  He does have history of long term NSAID use over the last few months for pain  I will order iron panel, vitamin B12, and folate  He has not been taking NSAIDs recently but I would like him to restart taking Aleve for pain  Last colonoscopy was less than 10 years ago when benign polyp was removed  I will schedule him for repeat colonoscopy and EGD to evaluate for any inflammation or ulcer as cause for anemia and to take biopsies for H pylori  Discussed risks, benefits, and alternatives to the procedure  Risks include but are not limited to bleeding, perforation, infection, and missed lesions  Bowel prep instructions were given  2  Gastroesophageal reflux disease  Patient currently takes Prilosec every morning   When he does not take this in the morning, he will experience significant GERD symptoms  He is able to continue taking Prilosec daily  ______________________________________________________________________    HPI:  Omega Dong is a 72 y o  male who presents today, per referral by Dr Caleb Dial, in consultation regarding evaluation and management of anemia  CBC on 8/6/18 revealed Hgb of 11 8  Hgb prior to this on 7/1/17 was 13 3  H pylori antigen performed on 8/22/18 was negative  He was taking Aleve long term after hiking in [facility] [place] and his PCP told him to stop long term NSAID use and to rather use Tylenol  He denies any abdominal pain or melena  He does take Prilosec every morning and if he does not take this or if he overeats, he will experience GErD symptoms  He had previous colonoscopy and EGD less than 10 years ago and benign polyp was removed  Inflammatory markers were elevated and he has history of arthritis  He does have history of gout and recent uric acid was normal  He has not had recent flare up  He has prior abdominal surgery of inguinal hernia repair  REVIEW OF SYSTEMS:    CONSTITUTIONAL: Denies any fever, chills, rigors, and weight loss  HEENT: No earache or tinnitus  Denies hearing loss or visual disturbances  CARDIOVASCULAR: No chest pain or palpitations  RESPIRATORY: Denies any cough, hemoptysis, shortness of breath or dyspnea on exertion  GASTROINTESTINAL: As noted in the History of Present Illness  GENITOURINARY: No problems with urination  Denies any hematuria or dysuria  NEUROLOGIC: No dizziness or vertigo, denies headaches  MUSCULOSKELETAL: Denies any muscle or joint pain  SKIN: Denies skin rashes or itching  ENDOCRINE: Denies excessive thirst  Denies intolerance to heat or cold  PSYCHOSOCIAL: Denies depression or anxiety  Denies any recent memory loss         Historical Information   Past Medical History:   Diagnosis Date    Age-related cataract of both eyes 6/30/2017    Arthritis     Benign colon polyp     Lawanda Gutiérrez assessed 6/17/2015    Bilateral inguinal hernia     Cellulitis of leg, left 6/30/2017    Chronic gout 6/30/2017    Essential hypertension 6/30/2017    Gastroesophageal reflux disease without esophagitis 6/30/2017    GERD (gastroesophageal reflux disease)     Gout     Herpes simplex type 1 infection     last assessed 12/18/2014    History of abdominal hernia 6/30/2017    History of pneumonia     History of prostatitis     History of total right hip arthroplasty 6/30/2017    History of total right hip replacement     Hyperlipidemia     Hypertension     Idiopathic peripheral neuropathy 6/30/2017    Kidney stone     Left inguinal hernia     Lyme disease     last assessed 7/10/2017    Nephrolithiasis 6/30/2017    Peripheral neuropathy     Podagra     last assessed 12/5/2014    Primary open angle glaucoma 6/30/2017    Prostatitis, chronic 6/30/2017    Rosacea 6/30/2017     Past Surgical History:   Procedure Laterality Date    ABDOMINAL SURGERY      COLONOSCOPY      last assessed 6/17/2015    DUPUYTREN / PALMAR FASCIOTOMY      last assessed 6/17/2015    DUPUYTREN CONTRACTURE RELEASE Left     ESOPHAGOGASTRODUODENOSCOPY      HERNIA REPAIR Right     inguinal    HERNIA REPAIR Left 2/9/2017    Procedure: REPAIR HERNIA INGUINAL, LAPAROSCOPIC WITH MESH;  Surgeon: Pavel Lopez MD;  Location: [facility];  Service:    [facility] Bilateral 2011    with mesh done at [facility]      hip replacement    LITHOTRIPSY     [address] Right     last assessed 12/5/2014     Social History   History   Alcohol Use    0 6 oz/week    1 Glasses of wine per week     Comment: daily with dinner/occas beer     History   Drug Use No     History   Smoking Status    Never Smoker   Smokeless Tobacco    Never Used     Comment: per allscripts ' former smoker / never a smoker '     Family History   Problem Relation Age of Onset    Coronary artery disease Mother    Kami Dunaway Cancer Father        Meds/Allergies       Current Outpatient Prescriptions:     acetaminophen (TYLENOL) 325 mg tablet    allopurinol (ZYLOPRIM) 300 mg tablet    ALPHA LIPOIC ACID PO    amoxicillin (AMOXIL) 500 mg capsule    atorvastatin (LIPITOR) 10 mg tablet    gabapentin (NEURONTIN) 300 mg capsule    losartan-hydrochlorothiazide (HYZAAR) 100-25 MG per tablet    metroNIDAZOLE (METROGEL) 1 % gel    Multiple Vitamin (MULTIVITAMIN) capsule    Na Sulfate-K Sulfate-Mg Sulf (SUPREP BOWEL PREP KIT) 17 5-3 13-1 6 GM/180ML SOLN    naproxen (NAPROSYN) 250 mg tablet    omeprazole (PriLOSEC) 20 mg delayed release capsule    ranitidine (ZANTAC) 150 MG capsule    Saw Palmetto, Serenoa repens, (SAW PALMETTO PO)    Silodosin (RAPAFLO) 4 MG CAPS    timolol (TIMOPTIC) 0 5 % ophthalmic solution    travoprost (TRAVATAN Z) 0 004 % ophthalmic solution    No Known Allergies        Objective     Blood pressure 160/90, pulse 64, temperature 98 9 °F (37 2 °C), temperature source Tympanic, height 5' 7" (1 702 m), weight 75 8 kg (167 lb)  Body mass index is 26 16 kg/m²  PHYSICAL EXAM:      General Appearance:   Alert, cooperative, no distress   HEENT:   Normocephalic, atraumatic, anicteric      Neck:  Supple, symmetrical, trachea midline   Lungs:   Clear to auscultation bilaterally; no rales, rhonchi or wheezing; respirations unlabored    Heart:   Regular rate and rhythm; no murmur, rub, or gallop  Abdomen:   Soft, non-tender, non-distended; normal bowel sounds; no masses, no organomegaly    Genitalia:   Deferred    Rectal:   Deferred    Extremities:  No cyanosis, clubbing or edema    Pulses:  2+ and symmetric    Skin:  No jaundice, rashes, or lesions    Lymph nodes:  No palpable cervical lymphadenopathy        Lab Results:   No visits with results within 1 Day(s) from this visit     Latest known visit with results is:   Lab on 08/22/2018   Component Date Value    H pylori Ag, Stl 08/22/2018 Negative Radiology Results:   Mri Lumbar Spine Wo Contrast on 9/18/2018  Impression: Right convex mid lumbar scoliosis  Multilevel spondylosis and osteoarthritis asymmetric owing to scoliosis  No critical foraminal or canal stenosis  Multiple potential causes for back pain  Attestation:   By signing my name below, Kt Barney, attest that this documentation has been prepared under the direction and in the presence of Senait Bond MD  Electronically Signed: Princess Vargas  09/28/18     I, Senait Bond, personally performed the services described in this documentation  All medical record entries made by the arslanibluis were at my direction and in my presence  I have reviewed the chart and discharge instructions and agree that the record reflects my personal performance and is accurate and complete    Senait Bond MD  09/28/18

## 2018-09-28 NOTE — PROGRESS NOTES
[facility] - Outpatient Consultation  [facility] 72 y o  male MRN: [id number]  Encounter: [id number]          ASSESSMENT AND PLAN:    [facility] is a 72 y o  male who presents today, per referral by Dr Janet Salazar, in consultation regarding evaluation and management of anemia  1  Anemia  Recent Hgb on 8/6/18 was low at 11 8  He is currently asymptomatic  It is possible that he has anemia of chronic disease, vitamin insufficieny, or GI bleed  He does have inflammatory diseases including gout and arthritis possibly causing anemia of chronic disease  He does have history of long term NSAID use over the last few months for pain  I will order iron panel, vitamin B12, and folate  He has not been taking NSAIDs recently but I would like him to restart taking Aleve for pain  Last colonoscopy was less than 10 years ago when benign polyp was removed  I will schedule him for repeat colonoscopy and EGD to evaluate for any inflammation or ulcer as cause for anemia and to take biopsies for H pylori  Discussed risks, benefits, and alternatives to the procedure  Risks include but are not limited to bleeding, perforation, infection, and missed lesions  Bowel prep instructions were given  2  Gastroesophageal reflux disease  Patient currently takes Prilosec every morning  When he does not take this in the morning, he will experience significant GERD symptoms  He is able to continue taking Prilosec daily  ______________________________________________________________________    HPI:  [facility] is a 72 y o  male who presents today, per referral by Dr Janet Salazar, in consultation regarding evaluation and management of anemia  CBC on 8/6/18 revealed Hgb of 11 8  Hgb prior to this on 7/1/17 was 13 3  H pylori antigen performed on 8/22/18 was negative  He was taking Aleve long term [place] and his PCP told him to stop long term NSAID use and to rather use Tylenol   He denies any abdominal pain or melena  He does take Prilosec every morning and if he does not take this or if he overeats, he will experience GErD symptoms  He had previous colonoscopy and EGD less than 10 years ago and benign polyp was removed  Inflammatory markers were elevated and he has history of arthritis  He does have history of gout and recent uric acid was normal  He has not had recent flare up  He has prior abdominal surgery of inguinal hernia repair  REVIEW OF SYSTEMS:    CONSTITUTIONAL: Denies any fever, chills, rigors, and weight loss  HEENT: No earache or tinnitus  Denies hearing loss or visual disturbances  CARDIOVASCULAR: No chest pain or palpitations  RESPIRATORY: Denies any cough, hemoptysis, shortness of breath or dyspnea on exertion  GASTROINTESTINAL: As noted in the History of Present Illness  GENITOURINARY: No problems with urination  Denies any hematuria or dysuria  NEUROLOGIC: No dizziness or vertigo, denies headaches  MUSCULOSKELETAL: Denies any muscle or joint pain  SKIN: Denies skin rashes or itching  ENDOCRINE: Denies excessive thirst  Denies intolerance to heat or cold  PSYCHOSOCIAL: Denies depression or anxiety  Denies any recent memory loss         Historical Information   Past Medical History:   Diagnosis Date    Age-related cataract of both eyes 6/30/2017    Arthritis     Benign colon polyp     /last assessed 6/17/2015    Bilateral inguinal hernia     Cellulitis of leg, left 6/30/2017    Chronic gout 6/30/2017    Essential hypertension 6/30/2017    Gastroesophageal reflux disease without esophagitis 6/30/2017    GERD (gastroesophageal reflux disease)     Gout     Herpes simplex type 1 infection     last assessed 12/18/2014    History of abdominal hernia 6/30/2017    History of pneumonia     History of prostatitis     History of total right hip arthroplasty 6/30/2017    History of total right hip replacement     Hyperlipidemia     Hypertension  Idiopathic peripheral neuropathy 6/30/2017    Kidney stone     Left inguinal hernia     Lyme disease     last assessed 7/10/2017    Nephrolithiasis 6/30/2017    Peripheral neuropathy     Podagra     last assessed 12/5/2014    Primary open angle glaucoma 6/30/2017    Prostatitis, chronic 6/30/2017    Rosacea 6/30/2017     Past Surgical History:   Procedure Laterality Date    ABDOMINAL SURGERY      COLONOSCOPY      last assessed 6/17/2015    DUPUYTREN / PALMAR FASCIOTOMY      last assessed 6/17/2015    DUPUYTREN CONTRACTURE RELEASE Left     ESOPHAGOGASTRODUODENOSCOPY      HERNIA REPAIR Right     inguinal    HERNIA REPAIR Left 2/9/2017    Procedure: REPAIR HERNIA INGUINAL, LAPAROSCOPIC WITH MESH;  Surgeon: Dipak Deleon MD;  Location: [facility];  Service:    [facility] Bilateral 2011    with mesh done at [facility]      hip replacement    LITHOTRIPSY     [address] Right     last assessed 12/5/2014     Social History   History   Alcohol Use    0 6 oz/week    1 Glasses of wine per week     Comment: daily with dinner/occas beer     History   Drug Use No     History   Smoking Status    Never Smoker   Smokeless Tobacco    Never Used     Comment: per allscripts ' former smoker / never a smoker '     Family History   Problem Relation Age of Onset    Coronary artery disease Mother     Cancer Father        Meds/Allergies       Current Outpatient Prescriptions:     acetaminophen (TYLENOL) 325 mg tablet    allopurinol (ZYLOPRIM) 300 mg tablet    ALPHA LIPOIC ACID PO    amoxicillin (AMOXIL) 500 mg capsule    atorvastatin (LIPITOR) 10 mg tablet    gabapentin (NEURONTIN) 300 mg capsule    losartan-hydrochlorothiazide (HYZAAR) 100-25 MG per tablet    metroNIDAZOLE (METROGEL) 1 % gel    Multiple Vitamin (MULTIVITAMIN) capsule    Na Sulfate-K Sulfate-Mg Sulf (SUPREP BOWEL PREP KIT) 17 5-3 13-1 6 GM/180ML SOLN    naproxen (NAPROSYN) 250 mg tablet    omeprazole (PriLOSEC) 20 mg delayed release capsule    ranitidine (ZANTAC) 150 MG capsule    Saw Palmetto, Serenoa repens, (SAW PALMETTO PO)    Silodosin (RAPAFLO) 4 MG CAPS    timolol (TIMOPTIC) 0 5 % ophthalmic solution    travoprost (TRAVATAN Z) 0 004 % ophthalmic solution    No Known Allergies        Objective     Blood pressure 160/90, pulse 64, temperature 98 9 °F (37 2 °C), temperature source Tympanic, height 5' 7" (1 702 m), weight 75 8 kg (167 lb)  Body mass index is 26 16 kg/m²  PHYSICAL EXAM:      General Appearance:   Alert, cooperative, no distress   HEENT:   Normocephalic, atraumatic, anicteric      Neck:  Supple, symmetrical, trachea midline   Lungs:   Clear to auscultation bilaterally; no rales, rhonchi or wheezing; respirations unlabored    Heart:   Regular rate and rhythm; no murmur, rub, or gallop  Abdomen:   Soft, non-tender, non-distended; normal bowel sounds; no masses, no organomegaly    Genitalia:   Deferred    Rectal:   Deferred    Extremities:  No cyanosis, clubbing or edema    Pulses:  2+ and symmetric    Skin:  No jaundice, rashes, or lesions    Lymph nodes:  No palpable cervical lymphadenopathy        Lab Results:   No visits with results within 1 Day(s) from this visit  Latest known visit with results is:   Lab on 08/22/2018   Component Date Value    H pylori Ag, Stl 08/22/2018 Negative          Radiology Results:   Mri Lumbar Spine Wo Contrast on 9/18/2018  Impression: Right convex mid lumbar scoliosis  Multilevel spondylosis and osteoarthritis asymmetric owing to scoliosis  No critical foraminal or canal stenosis  Multiple potential causes for back pain  Attestation:   By signing my name below, Karol Dean, attest that this documentation has been prepared under the direction and in the presence of Brittany De Los Santos MD  Electronically Signed: Princess Rebolledo   09/28/18     I, Brittany De Los Santos, personally performed the services described in this documentation  All medical record entries made by the scribe were at my direction and in my presence  I have reviewed the chart and discharge instructions and agree that the record reflects my personal performance and is accurate and complete    Thanh Vasquez MD  09/28/18

## 2018-10-11 DIAGNOSIS — N40.1 BPH WITH OBSTRUCTION/LOWER URINARY TRACT SYMPTOMS: Primary | ICD-10-CM

## 2018-10-11 DIAGNOSIS — N13.8 BPH WITH OBSTRUCTION/LOWER URINARY TRACT SYMPTOMS: Primary | ICD-10-CM

## 2018-10-14 RX ORDER — SILODOSIN 4 MG/1
CAPSULE ORAL
Qty: 90 CAPSULE | Refills: 2 | Status: SHIPPED | OUTPATIENT
Start: 2018-10-14 | End: 2018-10-18

## 2018-10-18 DIAGNOSIS — N40.0 BENIGN PROSTATIC HYPERPLASIA, UNSPECIFIED WHETHER LOWER URINARY TRACT SYMPTOMS PRESENT: Primary | ICD-10-CM

## 2018-10-18 RX ORDER — TAMSULOSIN HYDROCHLORIDE 0.4 MG/1
0.4 CAPSULE ORAL
Qty: 90 CAPSULE | Refills: 3 | Status: SHIPPED | OUTPATIENT
Start: 2018-10-18 | End: 2019-10-06 | Stop reason: SDUPTHER

## 2018-10-18 NOTE — TELEPHONE ENCOUNTER
Received phone call from patient requesting a change from Rapaflo to Tamsulosin  Patient is in the donut hole and wants a less expensive medication  Please advise

## 2018-10-30 ENCOUNTER — ANESTHESIA EVENT (OUTPATIENT)
Dept: GASTROENTEROLOGY | Facility: MEDICAL CENTER | Age: 73
End: 2018-10-30
Payer: COMMERCIAL

## 2018-10-31 ENCOUNTER — ANESTHESIA (OUTPATIENT)
Dept: GASTROENTEROLOGY | Facility: MEDICAL CENTER | Age: 73
End: 2018-10-31
Payer: COMMERCIAL

## 2018-10-31 ENCOUNTER — HOSPITAL ENCOUNTER (OUTPATIENT)
Facility: MEDICAL CENTER | Age: 73
Setting detail: OUTPATIENT SURGERY
Discharge: HOME | End: 2018-10-31
Attending: INTERNAL MEDICINE | Admitting: INTERNAL MEDICINE
Payer: COMMERCIAL

## 2018-10-31 VITALS
BODY MASS INDEX: 26.37 KG/M2 | HEIGHT: 67 IN | SYSTOLIC BLOOD PRESSURE: 153 MMHG | HEART RATE: 63 BPM | RESPIRATION RATE: 16 BRPM | OXYGEN SATURATION: 96 % | WEIGHT: 168 LBS | DIASTOLIC BLOOD PRESSURE: 74 MMHG | TEMPERATURE: 99.3 F

## 2018-10-31 DIAGNOSIS — D50.9 IRON DEFICIENCY ANEMIA, UNSPECIFIED IRON DEFICIENCY ANEMIA TYPE: Primary | ICD-10-CM

## 2018-10-31 DIAGNOSIS — D64.9 ANEMIA, UNSPECIFIED TYPE: ICD-10-CM

## 2018-10-31 PROCEDURE — 43251 EGD REMOVE LESION SNARE: CPT | Performed by: INTERNAL MEDICINE

## 2018-10-31 PROCEDURE — 88342 IMHCHEM/IMCYTCHM 1ST ANTB: CPT | Performed by: PATHOLOGY

## 2018-10-31 PROCEDURE — 43239 EGD BIOPSY SINGLE/MULTIPLE: CPT | Performed by: INTERNAL MEDICINE

## 2018-10-31 PROCEDURE — 88305 TISSUE EXAM BY PATHOLOGIST: CPT | Performed by: PATHOLOGY

## 2018-10-31 PROCEDURE — 45380 COLONOSCOPY AND BIOPSY: CPT | Performed by: INTERNAL MEDICINE

## 2018-10-31 RX ORDER — LIDOCAINE HYDROCHLORIDE 20 MG/ML
INJECTION, SOLUTION EPIDURAL; INFILTRATION; INTRACAUDAL; PERINEURAL AS NEEDED
Status: DISCONTINUED | OUTPATIENT
Start: 2018-10-31 | End: 2018-10-31 | Stop reason: SURG

## 2018-10-31 RX ORDER — OXYMETAZOLINE HYDROCHLORIDE 0.05 G/100ML
2 SPRAY NASAL 2 TIMES DAILY
COMMUNITY

## 2018-10-31 RX ORDER — EPHEDRINE SULFATE 50 MG/ML
INJECTION, SOLUTION INTRAVENOUS AS NEEDED
Status: DISCONTINUED | OUTPATIENT
Start: 2018-10-31 | End: 2018-10-31 | Stop reason: SURG

## 2018-10-31 RX ORDER — SODIUM CHLORIDE 9 MG/ML
125 INJECTION, SOLUTION INTRAVENOUS CONTINUOUS
Status: DISCONTINUED | OUTPATIENT
Start: 2018-10-31 | End: 2018-10-31 | Stop reason: HOSPADM

## 2018-10-31 RX ORDER — PROPOFOL 10 MG/ML
INJECTION, EMULSION INTRAVENOUS AS NEEDED
Status: DISCONTINUED | OUTPATIENT
Start: 2018-10-31 | End: 2018-10-31 | Stop reason: SURG

## 2018-10-31 RX ADMIN — PROPOFOL 150 MG: 10 INJECTION, EMULSION INTRAVENOUS at 11:49

## 2018-10-31 RX ADMIN — PROPOFOL 50 MG: 10 INJECTION, EMULSION INTRAVENOUS at 12:01

## 2018-10-31 RX ADMIN — PROPOFOL 50 MG: 10 INJECTION, EMULSION INTRAVENOUS at 11:54

## 2018-10-31 RX ADMIN — PROPOFOL 50 MG: 10 INJECTION, EMULSION INTRAVENOUS at 12:14

## 2018-10-31 RX ADMIN — LIDOCAINE HYDROCHLORIDE 5 ML: 20 INJECTION, SOLUTION EPIDURAL; INFILTRATION; INTRACAUDAL; PERINEURAL at 11:49

## 2018-10-31 RX ADMIN — SODIUM CHLORIDE 125 ML/HR: 0.9 INJECTION, SOLUTION INTRAVENOUS at 11:10

## 2018-10-31 RX ADMIN — EPHEDRINE SULFATE 5 MG: 50 INJECTION, SOLUTION INTRAMUSCULAR; INTRAVENOUS; SUBCUTANEOUS at 12:02

## 2018-10-31 NOTE — ANESTHESIA PREPROCEDURE EVALUATION
Review of Systems/Medical History  Patient summary reviewed  Chart reviewed      Cardiovascular  Hyperlipidemia, Hypertension controlled,    Pulmonary       GI/Hepatic    GERD well controlled,        Kidney stones,        Endo/Other  Negative endo/other ROS      GYN       Hematology  Anemia ,     Musculoskeletal    Arthritis     Neurology  Negative neurology ROS      Psychology   Negative psychology ROS              Physical Exam    Airway    Mallampati score: I  TM Distance: >3 FB  Neck ROM: full     Dental       Cardiovascular  Rhythm: regular, Rate: normal, Cardiovascular exam normal    Pulmonary  Pulmonary exam normal Breath sounds clear to auscultation,     Other Findings        Anesthesia Plan  ASA Score- 2     Anesthesia Type- IV sedation with anesthesia with ASA Monitors  Additional Monitors:   Airway Plan:         Plan Factors- Patient instructed to abstain from smoking on day of procedure  Patient did not smoke on day of surgery  Induction- intravenous  Postoperative Plan-     Informed Consent- Anesthetic plan and risks discussed with patient

## 2018-10-31 NOTE — DISCHARGE INSTR - AVS FIRST PAGE
OPERATIVE REPORT  PATIENT NAME: Baron Pineda    :  1945  MRN: [id number]  Pt Location: [facility] GI ROOM 01    SURGERY DATE: 10/31/2018    Surgeon(s) and Role:     * Tommie Holstein, MD - Primary    Preop Diagnosis:  Anemia, unspecified type [D64 9]    Post-Op Diagnosis Codes:     * Anemia, unspecified type [D64 9]    Procedure(s) (LRB):  EGD AND COLONOSCOPY (N/A)    Specimen(s):  ID Type Source Tests Collected by Time Destination  1 : r/o celiac Tissue Duodenum TISSUE EXAM Tommie Holstein, MD 10/31/2018 1151   2 : r/o h pylori Tissue Stomach TISSUE EXAM Tommie Holstein, MD 10/31/2018 1152       Estimated Blood Loss:   Minimal    ESOPHAGOGASTRODUODENOSCOPY    PROCEDURE: EGD    SEDATION: Monitored anesthesia care, check anesthesia records    ASA Class: 2    INDICATIONS:  anemia    CONSENT:  Informed consent was obtained for the procedure, including sedation after explaining the risks and benefits of the procedure  Risks including but not limited to bleeding, perforation, infection, and missed lesion  PREPARATION:   Telemetry, pulse oximetry, blood pressure were monitored throughout the procedure  Patient was identified by myself both verbally and by visual inspection of ID band  DESCRIPTION:   Patient was placed in the left lateral decubitus position and was sedated with the above medication  The gastroscope was introduced in to the oropharynx and the esophagus was intubated under direct visualization  Scope was passed down the esophagus up to 2nd part of the duodenum  A careful inspection was made as the gastroscope was withdrawn, including a retroflexed view of the stomach; findings and interventions are described below  FINDINGS:    #1  Esophagus- 4 cm  Seen with mild esophagitis likely due to sliding nature of the hernia     Hiatal hernia spanned from 34-38 cm  #2  Stomach-   Normal-appearing stomach with  6 mm polyp seen removed by cold snare polypectomy    Random biopsy done for further evaluation of H pylori  #3  Duodenum- normal  Biopsy performed for celiac disease         IMPRESSIONS:      -medium size hiatal hernia possible cause for anemia due to esophagitis  -normal appearing stomach and duodenum status post biopsy for H pylori    RECOMMENDATIONS:     - continue antacids and PPI therapy  -follow-up biopsy results  -colonoscopy for further evaluation of anemia    COMPLICATIONS:  None; patient tolerated the procedure well  DISPOSITION: PACU           CONDITION: Stable    Colonoscopy Procedure Note    Procedure: Colonoscopy    Sedation: Monitored anesthesia care, check anesthesia records      ASA Class: 2    INDICATIONS:  Diverticulosis seen throughout the colon    POST-OP DIAGNOSIS: See the impression below    Procedure Details     Prior colonoscopy: 9 years ago  Informed consent was obtained for the procedure, including sedation  Risks of perforation, hemorrhage, adverse drug reaction and aspiration were discussed  The patient was placed in the left lateral decubitus position  Based on the pre-procedure assessment, including review of the patient's medical history, medications, allergies, and review of systems, he had been deemed to be an appropriate candidate for conscious sedation; he was therefore sedated with the medications listed below  The patient was monitored continuously with telemetry, pulse oximetry, blood pressure monitoring, and direct observations  A rectal examination was performed  The colonoscope was inserted into the rectum and advanced under direct vision to the  Terminal ileum, which was identified by the ileocecal valve and appendiceal orifice  The quality of the colonic preparation was good  A careful inspection was made as the colonoscope was withdrawn, including a retroflexed view of the rectum; findings and interventions are described below      Findings:  -terminal ileum and colon  Appeared to be normal endoscopically  -small internal hemorrhoids  -diverticulosis seen throughout the whole colon  - 3 colonic polyp seen in the ascending colon removed by cold biopsy polypectomy polyps measured 2-4 mm in size           Complications: None; patient tolerated the procedure well  Impression:     multiple colonic polyps removed, evidence of small hemorrhoids and diverticulosis otherwise colonoscopy in terminal ileum appeared to be normal    Recommendations:  Repeat colonoscopy in 3 years if polyp is an adenoma     follow-up biopsy results    SIGNATURE: David Deluca MD  DATE: October 31, 2018  TIME: 11:56 AM

## 2018-10-31 NOTE — OP NOTE
OPERATIVE REPORT  PATIENT NAME: Dieudonne Del Valle    :  1945  MRN: [id number]  Pt Location: [facility] GI ROOM 01    SURGERY DATE: 10/31/2018    Surgeon(s) and Role:     * Mahendra Tatum MD - Primary    Preop Diagnosis:  Anemia, unspecified type [D64 9]    Post-Op Diagnosis Codes:     * Anemia, unspecified type [D64 9]    Procedure(s) (LRB):  EGD AND COLONOSCOPY (N/A)    Specimen(s):  ID Type Source Tests Collected by Time Destination   1 : r/o celiac Tissue Duodenum TISSUE EXAM Mahendra Tatum MD 10/31/2018 1151    2 : r/o h pylori Tissue Stomach TISSUE EXAM Mahendra Tatum MD 10/31/2018 1152        Estimated Blood Loss:   Minimal    ESOPHAGOGASTRODUODENOSCOPY    PROCEDURE: EGD    SEDATION: Monitored anesthesia care, check anesthesia records    ASA Class: 2    INDICATIONS:  anemia    CONSENT:  Informed consent was obtained for the procedure, including sedation after explaining the risks and benefits of the procedure  Risks including but not limited to bleeding, perforation, infection, and missed lesion  PREPARATION:   Telemetry, pulse oximetry, blood pressure were monitored throughout the procedure  Patient was identified by myself both verbally and by visual inspection of ID band  DESCRIPTION:   Patient was placed in the left lateral decubitus position and was sedated with the above medication  The gastroscope was introduced in to the oropharynx and the esophagus was intubated under direct visualization  Scope was passed down the esophagus up to 2nd part of the duodenum  A careful inspection was made as the gastroscope was withdrawn, including a retroflexed view of the stomach; findings and interventions are described below  FINDINGS:    #1  Esophagus- 4 cm  Seen with mild esophagitis likely due to sliding nature of the hernia     Hiatal hernia spanned from 34-38 cm  #2  Stomach-   Normal-appearing stomach with  6 mm polyp seen removed by cold snare polypectomy    Random biopsy done for further evaluation of H pylori  #3  Duodenum- normal  Biopsy performed for celiac disease         IMPRESSIONS:      -medium size hiatal hernia possible cause for anemia due to esophagitis  -normal appearing stomach and duodenum status post biopsy for H pylori    RECOMMENDATIONS:     - continue antacids and PPI therapy  -follow-up biopsy results  -colonoscopy for further evaluation of anemia    COMPLICATIONS:  None; patient tolerated the procedure well  DISPOSITION: PACU           CONDITION: Stable    Colonoscopy Procedure Note    Procedure: Colonoscopy    Sedation: Monitored anesthesia care, check anesthesia records      ASA Class: 2    INDICATIONS:  Diverticulosis seen throughout the colon    POST-OP DIAGNOSIS: See the impression below    Procedure Details     Prior colonoscopy: 9 years ago  Informed consent was obtained for the procedure, including sedation  Risks of perforation, hemorrhage, adverse drug reaction and aspiration were discussed  The patient was placed in the left lateral decubitus position  Based on the pre-procedure assessment, including review of the patient's medical history, medications, allergies, and review of systems, he had been deemed to be an appropriate candidate for conscious sedation; he was therefore sedated with the medications listed below  The patient was monitored continuously with telemetry, pulse oximetry, blood pressure monitoring, and direct observations  A rectal examination was performed  The colonoscope was inserted into the rectum and advanced under direct vision to the  Terminal ileum, which was identified by the ileocecal valve and appendiceal orifice  The quality of the colonic preparation was good  A careful inspection was made as the colonoscope was withdrawn, including a retroflexed view of the rectum; findings and interventions are described below      Findings:  -terminal ileum and colon  Appeared to be normal endoscopically  -small internal hemorrhoids  -diverticulosis seen throughout the whole colon  - 3 colonic polyp seen in the ascending colon removed by cold biopsy polypectomy polyps measured 2-4 mm in size           Complications: None; patient tolerated the procedure well  Impression:     multiple colonic polyps removed, evidence of small hemorrhoids and diverticulosis otherwise colonoscopy in terminal ileum appeared to be normal    Recommendations:  Repeat colonoscopy in 3 years if polyp is an adenoma     follow-up biopsy results  - discuss taking iron therapy for anemia if anemia does not resolve with consider PillCam evaluation  - anemia possibly secondary to sliding hiatal hernia causing mild esophagitis    SIGNATURE: Eduardo Stone MD  DATE: October 31, 2018  TIME: 11:56 AM

## 2018-10-31 NOTE — H&P
History and Physical - [facility]  Jason Ortega 72 y o  male MRN: [id number]                  HPI: Jason Ortega is a 72y o  year old male who presents for  Borderline anemia  Plan for EGD and colonoscopy today  REVIEW OF SYSTEMS: Per the HPI, and otherwise unremarkable      Historical Information   Past Medical History:   Diagnosis Date    Age-related cataract of both eyes 6/30/2017    Arthritis     Benign colon polyp     /last assessed 6/17/2015    Bilateral inguinal hernia     Cellulitis of leg, left 6/30/2017    Chronic gout 6/30/2017    Essential hypertension 6/30/2017    Gastroesophageal reflux disease without esophagitis 6/30/2017    GERD (gastroesophageal reflux disease)     Gout     Herpes simplex type 1 infection     last assessed 12/18/2014    History of abdominal hernia 6/30/2017    History of pneumonia     History of prostatitis     History of total right hip arthroplasty 6/30/2017    History of total right hip replacement     Hyperlipidemia     Hypertension     Idiopathic peripheral neuropathy 6/30/2017    Kidney stone     Left inguinal hernia     Lyme disease     last assessed 7/10/2017    Nephrolithiasis 6/30/2017    Peripheral neuropathy     Podagra     last assessed 12/5/2014    Primary open angle glaucoma 6/30/2017    Prostatitis, chronic 6/30/2017    Rosacea 6/30/2017     Past Surgical History:   Procedure Laterality Date    ABDOMINAL SURGERY      CATARACT EXTRACTION      COLONOSCOPY      last assessed 6/17/2015    DUPUYTREN / PALMAR FASCIOTOMY      last assessed 6/17/2015    DUPUYTREN CONTRACTURE RELEASE Left     ESOPHAGOGASTRODUODENOSCOPY      HERNIA REPAIR Right     inguinal    HERNIA REPAIR Left 2/9/2017    Procedure: REPAIR HERNIA INGUINAL, LAPAROSCOPIC WITH MESH;  Surgeon: Anshul eRal MD;  Location: [facility];  Service:    [facility] Bilateral 2011    with mesh done at [address] [place] [address] REPLACEMENT      hip replacement    LITHOTRIPSY      TONSILLECTOMY      TOTAL HIP ARTHROPLASTY Right     last assessed 12/5/2014     Social History   History   Alcohol Use    0 6 oz/week    1 Glasses of wine per week     Comment: daily with dinner/occas beer     History   Drug Use No     History   Smoking Status    Never Smoker   Smokeless Tobacco    Never Used     Comment: per allscripts ' former smoker / never a smoker '     Family History   Problem Relation Age of Onset    Coronary artery disease Mother     Cancer Father        Meds/Allergies     Prescriptions Prior to Admission   Medication    allopurinol (ZYLOPRIM) 300 mg tablet    atorvastatin (LIPITOR) 10 mg tablet    gabapentin (NEURONTIN) 300 mg capsule    losartan-hydrochlorothiazide (HYZAAR) 100-25 MG per tablet    omeprazole (PriLOSEC) 20 mg delayed release capsule    tamsulosin (FLOMAX) 0 4 mg    timolol (TIMOPTIC) 0 5 % ophthalmic solution    travoprost (TRAVATAN Z) 0 004 % ophthalmic solution    acetaminophen (TYLENOL) 325 mg tablet    ALPHA LIPOIC ACID PO    amoxicillin (AMOXIL) 500 mg capsule    metroNIDAZOLE (METROGEL) 1 % gel    Multiple Vitamin (MULTIVITAMIN) capsule    naproxen (NAPROSYN) 250 mg tablet    ranitidine (ZANTAC) 150 MG capsule    Saw Palmetto, Serenoa repens, (SAW PALMETTO PO)       No Known Allergies    Objective     Blood pressure 133/90, pulse 72, temperature 99 3 °F (37 4 °C), temperature source Temporal, resp  rate 18, height 5' 7" (1 702 m), weight 76 2 kg (168 lb), SpO2 97 %  PHYSICAL EXAM    Gen: NAD  CV: RRR  CHEST: Clear  ABD: soft, NT/ND  EXT: no edema      ASSESSMENT/PLAN:  This is a 72y o  year old male here for  EGD and colonoscopy and he is stable and optimized for his procedure

## 2018-10-31 NOTE — DISCHARGE INSTRUCTIONS
Upper Endoscopy   WHAT YOU NEED TO KNOW:   An upper endoscopy is also called an upper gastrointestinal (GI) endoscopy, or an esophagogastroduodenoscopy (EGD)  You may feel bloated, gassy, or have some abdominal discomfort after your procedure  Your throat may be sore for 24 to 36 hours  You may burp or pass gas from air that is still inside your body  DISCHARGE INSTRUCTIONS:   Call 911 for any of the following:   · You have sudden chest pain or trouble breathing  Seek care immediately if:   · You feel dizzy or faint  · You have trouble swallowing  · Your bowel movements are very dark or black  · Your abdomen is hard and firm and you have severe pain  · You vomit blood  Contact your healthcare provider if:   · You feel full or bloated and cannot burp or pass gas  · You have not had a bowel movement for 3 days after your procedure  · You have neck pain  · You have a fever or chills  · You have nausea or are vomiting  · You have a rash or hives  · You have questions or concerns about your endoscopy  Relieve a sore throat:  Suck on throat lozenges or crushed ice  Gargle with a small amount of warm salt water  Mix 1 teaspoon of salt and 1 cup of warm water to make salt water  Relieve gas and discomfort from bloating:  Lie on your right side with a heating pad on your abdomen  Take short walks to help pass gas  Eat small meals until bloating is relieved  Rest after your procedure: You have been given medicine to relax you  Do not  drive or make important decisions until the day after your procedure  Return to your normal activity as directed  You can usually return to work the day after your procedure  Follow up with your healthcare provider as directed:  Write down your questions so you remember to ask them during your visits     © 2017 [address] is for End User's use only and may not be sold, redistributed or otherwise used for commercial purposes  All illustrations and images included in CareNotes® are the copyrighted property of [employer]  or Iain Desai  The above information is an  only  It is not intended as medical advice for individual conditions or treatments  Talk to your doctor, nurse or pharmacist before following any medical regimen to see if it is safe and effective for you  Hiatal Hernia   WHAT YOU NEED TO KNOW:   A hiatal hernia is a condition that causes part of your stomach to bulge through the hiatus (small opening) in your diaphragm  The part of the stomach may move up and down, or it may get trapped above the diaphragm  DISCHARGE INSTRUCTIONS:   Seek care immediately if:   · You have severe abdominal pain  · You try to vomit but nothing comes out (retching)  · You have severe chest pain and sudden trouble breathing  · Your bowel movements are black or bloody  · Your vomit looks like coffee grounds or has blood in it  Contact your healthcare provider if:   · Your symptoms are getting worse  · You have nausea, and you are vomiting  · You are losing weight without trying  · You have questions or concerns about your condition or care  Medicines:   · Medicines  may be given to relieve heartburn symptoms  These medicines help to decrease or block stomach acid  You may also be given medicines that help to tighten the esophageal sphincter  · Take your medicine as directed  Contact your healthcare provider if you think your medicine is not helping or if you have side effects  Tell him or her if you are allergic to any medicine  Keep a list of the medicines, vitamins, and herbs you take  Include the amounts, and when and why you take them  Bring the list or the pill bottles to follow-up visits  Carry your medicine list with you in case of an emergency    Follow up with your healthcare provider as directed:  Write down your questions so you remember to ask them during your visits  Self care:   · Avoid foods that make your symptoms worse  These may include spicy foods, fruit juices, alcohol, caffeine, chocolate, and mint  · Eat several small meals during the day  Small meals give your stomach less food to digest     · Avoid lying down and bending forward after you eat  Do not eat meals 2 to 3 hours before bedtime  This decreases your risk for reflux  · Maintain a healthy weight  If you are overweight, weight loss may help relieve your symptoms  · Sleep with your head elevated  at least 6 inches  · Do not smoke  Smoking can increase your symptoms of heartburn  © 2017 [address]  Information is for End User's use only and may not be sold, redistributed or otherwise used for commercial purposes  All illustrations and images included in CareNotes® are the copyrighted property of [employer]  or Iain Desai  The above information is an  only  It is not intended as medical advice for individual conditions or treatments  Talk to your doctor, nurse or pharmacist before following any medical regimen to see if it is safe and effective for you  Diet for Stomach Ulcers and Gastritis   AMBULATORY CARE:   A diet for stomach ulcers and gastritis  is a meal plan that limits foods that irritate your stomach  Certain foods may worsen symptoms such as stomach pain, bloating, heartburn, or indigestion  Foods to limit or avoid:  You may need to avoid acidic, spicy, or high-fat foods  Not all foods affect everyone the same way  You will need to learn which foods worsen your symptoms and limit those foods   The following are some foods that may worsen ulcer or gastritis symptoms:  · Beverages:      ¨ Whole milk and chocolate milk    ¨ Hot cocoa and cola    ¨ Any beverage with caffeine    ¨ Regular and decaffeinated coffee    ¨ Peppermint and spearmint tea    ¨ Green and black tea, with or without caffeine    ¨ Orange and grapefruit juices    ¨ Drinks that contain alcohol    · Spices and seasonings:      ¨ Black and red pepper    ¨ Chili powder    ¨ Mustard seed and nutmeg    · Other foods:      ¨ Dairy foods made from whole milk or cream    ¨ Chocolate    ¨ Spicy or strongly flavored cheeses, such as jalapeno or black pepper    ¨ Highly seasoned, high-fat meats, such as sausage, salami, patton, ham, and cold cuts    ¨ Hot chiles and peppers    ¨ Tomato products, such as tomato paste, tomato sauce, or tomato juice  Foods to include:  Eat a variety of healthy foods from all the food groups  Eat fruits, vegetables, whole grains, and fat-free or low-fat dairy foods  Whole grains include whole-wheat breads, cereals, pasta, and brown rice  Choose lean meats, poultry (chicken and turkey), fish, beans, eggs, and nuts  A healthy meal plan is low in unhealthy fats, salt, and added sugar  Healthy fats include olive oil and canola oil  Ask your dietitian for more information about a healthy diet  Other helpful guidelines:   · Do not eat right before bedtime  Stop eating at least 2 hours before bedtime  · Eat small, frequent meals  Your stomach may tolerate small, frequent meals better than large meals  © 2017 [address] Information is for End User's use only and may not be sold, redistributed or otherwise used for commercial purposes  All illustrations and images included in CareNotes® are the copyrighted property of A D A M [employer] Inc  or Iain Desai  The above information is an  only  It is not intended as medical advice for individual conditions or treatments  Talk to your doctor, nurse or pharmacist before following any medical regimen to see if it is safe and effective for you  Colorectal Polyps   WHAT YOU NEED TO KNOW:   Colorectal polyps are small growths of tissue in the lining of the colon and rectum   Most polyps are hyperplastic polyps and are usually benign (noncancerous)  Certain types of polyps, called adenomatous polyps, may turn into cancer  DISCHARGE INSTRUCTIONS:   Follow up with your healthcare provider or gastroenterologist as directed: You may need to return for more tests, such as another colonoscopy  Write down your questions so you remember to ask them during your visits  Reduce your risk for colorectal polyps:   · Eat a variety of healthy foods:  Healthy foods include fruit, vegetables, whole-grain breads, low-fat dairy products, beans, lean meat, and fish  Ask if you need to be on a special diet  · Maintain a healthy weight:  Ask your healthcare provider if you need to lose weight and how much you need to lose  Ask for help with a weight loss program     · Exercise:  Begin to exercise slowly and do more as you get stronger  Talk with your healthcare provider before you start an exercise program      · Limit alcohol:  Your risk for polyps increases the more you drink  · Do not smoke: If you smoke, it is never too late to quit  Ask for information about how to stop  For support and more information:   · Cathy Kan [facility]) [address], [place] [address]  Phone: [phone]  Web Address: www digestive  niddk nih gov  Contact your healthcare provider or gastroenterologist if:   · You have a fever  · You have chills, a cough, or feel weak and achy  · You have abdominal pain that does not go away or gets worse after you take medicine  · Your abdomen is swollen  · You are losing weight without trying  · You have questions or concerns about your condition or care  Seek care immediately or call 911 if:   · You have sudden shortness of breath  · You have a fast heart rate, fast breathing, or are too dizzy to stand up  · You have severe abdominal pain  · You see blood in your bowel movement    © 2017 [address] Information is for End User's use only and may not be sold, redistributed or otherwise used for commercial purposes  All illustrations and images included in CareNotes® are the copyrighted property of A [employer] M [employer] Inc  or Iain Desai  The above information is an  only  It is not intended as medical advice for individual conditions or treatments  Talk to your doctor, nurse or pharmacist before following any medical regimen to see if it is safe and effective for you  Colonoscopy   WHAT YOU NEED TO KNOW:   A colonoscopy is a procedure to examine the inside of your colon (intestine) with a scope  Polyps or tissue growths may have been removed during your colonoscopy  It is normal to feel bloated and to have some abdominal discomfort  You should be passing gas  If you have hemorrhoids or you had polyps removed, you may have a small amount of bleeding  DISCHARGE INSTRUCTIONS:   Seek care immediately if:   · You have a large amount of bright red blood in your bowel movements  · Your abdomen is hard and firm and you have severe pain  · You have sudden trouble breathing  Contact your healthcare provider if:   · You develop a rash or hives  · You have a fever within 24 hours of your procedure  · You have not had a bowel movement for 3 days after your procedure  · You have questions or concerns about your condition or care  Activity:   · Do not lift, strain, or run  for 3 days after your procedure  · Rest after your procedure  You have been given medicine to relax you  Do not  drive or make important decisions until the day after your procedure  Return to your normal activity as directed  · Relieve gas and discomfort from bloating  by lying on your right side with a heating pad on your abdomen  You may need to take short walks to help the gas move out  Eat small meals until bloating is relieved  If you had polyps removed: For 7 days after your procedure:  · Do not  take aspirin      · Do not  go on long car rides  Help prevent constipation:   · Eat a variety of healthy foods  Healthy foods include fruit, vegetables, whole-grain breads, low-fat dairy products, beans, lean meat, and fish  Ask if you need to be on a special diet  Your healthcare provider may recommend that you eat high-fiber foods such as cooked beans  Fiber helps you have regular bowel movements  · Drink liquids as directed  Adults should drink between 9 and 13 eight-ounce cups of liquid every day  Ask what amount is best for you  For most people, good liquids to drink are water, juice, and milk  · Exercise as directed  Talk to your healthcare provider about the best exercise plan for you  Exercise can help prevent constipation, decrease your blood pressure and improve your health  Follow up with your healthcare provider as directed:  Write down your questions so you remember to ask them during your visits  © 2017 [address] [facility] Information is for End User's use only and may not be sold, redistributed or otherwise used for commercial purposes  All illustrations and images included in CareNotes® are the copyrighted property of A [employer] M [employer] Inc  or Iain Desai  The above information is an  only  It is not intended as medical advice for individual conditions or treatments  Talk to your doctor, nurse or pharmacist before following any medical regimen to see if it is safe and effective for you  Colorectal Polyps   WHAT YOU NEED TO KNOW:   Colorectal polyps are small growths of tissue in the lining of the colon and rectum  Most polyps are hyperplastic polyps and are usually benign (noncancerous)  Certain types of polyps, called adenomatous polyps, may turn into cancer  DISCHARGE INSTRUCTIONS:   Follow up with your healthcare provider or gastroenterologist as directed: You may need to return for more tests, such as another colonoscopy   Write down your questions so you remember to ask them during your visits  Reduce your risk for colorectal polyps:   · Eat a variety of healthy foods:  Healthy foods include fruit, vegetables, whole-grain breads, low-fat dairy products, beans, lean meat, and fish  Ask if you need to be on a special diet  · Maintain a healthy weight:  Ask your healthcare provider if you need to lose weight and how much you need to lose  Ask for help with a weight loss program     · Exercise:  Begin to exercise slowly and do more as you get stronger  Talk with your healthcare provider before you start an exercise program      · Limit alcohol:  Your risk for polyps increases the more you drink  · Do not smoke: If you smoke, it is never too late to quit  Ask for information about how to stop  For support and more information:   · Cathy Kan [facility]) [address], [place] [address]  Phone: [phone]  Web Address: www digestive  niddk nih gov  Contact your healthcare provider or gastroenterologist if:   · You have a fever  · You have chills, a cough, or feel weak and achy  · You have abdominal pain that does not go away or gets worse after you take medicine  · Your abdomen is swollen  · You are losing weight without trying  · You have questions or concerns about your condition or care  Seek care immediately or call 911 if:   · You have sudden shortness of breath  · You have a fast heart rate, fast breathing, or are too dizzy to stand up  · You have severe abdominal pain  · You see blood in your bowel movement  © 2017 [address] Information is for End User's use only and may not be sold, redistributed or otherwise used for commercial purposes  All illustrations and images included in CareNotes® are the copyrighted property of [employer]  or Iain Desai  The above information is an  only   It is not intended as medical advice for individual conditions or treatments  Talk to your doctor, nurse or pharmacist before following any medical regimen to see if it is safe and effective for you  Hemorrhoids   WHAT YOU NEED TO KNOW:   Hemorrhoids are swollen blood vessels inside your rectum (internal hemorrhoids) or on your anus (external hemorrhoids)  Sometimes a hemorrhoid may prolapse  This means it extends out of your anus  DISCHARGE INSTRUCTIONS:   Seek care immediately if:   · You have severe pain in your rectum or around your anus  · You have severe pain in your abdomen and you are vomiting  · You have bleeding from your anus that soaks through your underwear  Contact your healthcare provider if:   · You have frequent and painful bowel movements  · Your hemorrhoid looks or feels more swollen than usual      · You do not have a bowel movement for 2 days or more  · You see or feel tissue coming through your anus  · You have questions or concerns about your condition or care  Medicines: You may  need any of the following:  · Medicine  may be given to decrease pain, swelling, and itching  The medicine may come as a pad, cream, or ointment  · Stool softeners  help treat or prevent constipation  · NSAIDs , such as ibuprofen, help decrease swelling, pain, and fever  NSAIDs can cause stomach bleeding or kidney problems in certain people  If you take blood thinner medicine, always ask your healthcare provider if NSAIDs are safe for you  Always read the medicine label and follow directions  · Take your medicine as directed  Contact your healthcare provider if you think your medicine is not helping or if you have side effects  Tell him or her if you are allergic to any medicine  Keep a list of the medicines, vitamins, and herbs you take  Include the amounts, and when and why you take them  Bring the list or the pill bottles to follow-up visits  Carry your medicine list with you in case of an emergency    Manage your symptoms:   · Apply ice on your anus for 15 to 20 minutes every hour or as directed  Use an ice pack, or put crushed ice in a plastic bag  Cover it with a towel before you apply it to your anus  Ice helps prevent tissue damage and decreases swelling and pain  · Take a sitz bath  Fill a bathtub with 4 to 6 inches of warm water  You may also use a sitz bath pan that fits inside a toilet bowl  Sit in the sitz bath for 15 minutes  Do this 3 times a day, and after each bowel movement  The warm water can help decrease pain and swelling  · Keep your anal area clean  Gently wash the area with warm water daily  Soap may irritate the area  After a bowel movement, wipe with moist towelettes or wet toilet paper  Dry toilet paper can irritate the area  Prevent hemorrhoids:   · Do not strain to have a bowel movement  Do not sit on the toilet too long  These actions can increase pressure on the tissues in your rectum and anus  · Drink plenty of liquids  Liquids can help prevent constipation  Ask how much liquid to drink each day and which liquids are best for you  · Eat a variety of high-fiber foods  Examples include fruits, vegetables, and whole grains  Ask your healthcare provider how much fiber you need each day  You may need to take a fiber supplement  · Exercise as directed  Exercise, such as walking, may make it easier to have a bowel movement  Ask your healthcare provider to help you create an exercise plan  · Do not have anal sex  Anal sex can weaken the skin around your rectum and anus  · Avoid heavy lifting  This can cause straining and increase your risk for another hemorrhoid  Follow up with your healthcare provider as directed:  Write down your questions so you remember to ask them during your visits  © 2017 [address] Information is for End User's use only and may not be sold, redistributed or otherwise used for commercial purposes   All illustrations and images included in CareNotes® are the copyrighted property of [employer]  or Iain Desai  The above information is an  only  It is not intended as medical advice for individual conditions or treatments  Talk to your doctor, nurse or pharmacist before following any medical regimen to see if it is safe and effective for you  Esophagitis   AMBULATORY CARE:   Esophagitis is inflammation or irritation of the lining of the esophagus  Common signs and symptoms of esophagitis:  Signs and symptoms depend on the cause of your esophagitis  You may have any of the following:  · Pain in the middle of your chest that may spread to your back    · Burning or pain in your esophagus, abdominal pain, or indigestion    · Trouble swallowing, or pain when you swallow    · A feeling that something is stuck in your esophagus    · Sore throat, a cough, or hoarseness    · Gagging, drooling, or wheezing    · Mouth sores (white patches), or a bad taste in your mouth or bad breath    · Nausea or vomiting    · Feeding problems or failure to thrive (young children)  Call 911 if:   · You have chest pain that does not go away within a few minutes or gets worse  Seek care immediately if:   · You feel like you have food stuck in your throat and you cannot cough it out  Contact your healthcare provider if:   · You have new or worsening symptoms, even after treatment  · You have questions or concerns about your condition or care  Treatment  may help the lining of your esophagus heal and prevent serious complications  Treatment will depend on what is causing your esophagitis  Symptoms caused by a toxic object such as a button battery need immediate treatment  Less severe causes may not need treatment  You may need any of the following if symptoms continue or get worse:  · Medicines  may be given to fight infection or to control stomach acid   Your healthcare provider may make changes to your medicines, such as changing it to a liquid form  · An elimination diet  may help you find foods that are causing your symptoms  You will stop eating certain foods that can cause esophagitis  Your healthcare provider will tell you to start eating them again one at a time  Each time you do not have symptoms, you will start eating another food from the list  Any food that does cause symptoms may be causing your esophagitis  · Surgery  may be needed if other treatments do not work  Part of your stomach can be wrapped to cover the valve between your stomach and esophagus  This helps prevent acid from backing up into your esophagus  Do not smoke:  Nicotine and other chemicals in cigarettes and cigars can cause blood vessel and lung damage  Ask your healthcare provider for information if you currently smoke and need help to quit  E-cigarettes or smokeless tobacco still contain nicotine  Talk to your healthcare provider before you use these products  Do not drink alcohol:  Alcohol can irritate your esophagus  Talk to your healthcare provider if you need help to stop drinking  Keep batteries and similar objects out of the reach of children:  Babies often put items in their mouths to explore them  Button batteries are easy to swallow and can cause serious damage  Keep the battery covers of electronic devices such as remote controls taped closed  Store all batteries and toxic materials where children cannot get to them  Use childproof locks to keep children away from dangerous materials  Manage or prevent esophagitis:   · Limit or do not eat foods that can lead to esophagitis  Foods such as oranges and salsa can irritate your esophagus  Caffeine and chocolate can cause acid reflux  High-fat and fried foods make your stomach digest food more slowly  This increases the amount of stomach acid your esophagus is exposed to  Eat small meals, and drink water with your meals   Soft foods such as yogurt and applesauce may help soothe your throat  Do not eat for at least 3 hours before you go to bed  · Prevent acid reflux  Do not bend over unless it is necessary  Acid may back up into your esophagus when you bend over  If possible, keep the head of your bed elevated while you sleep  This will help keep acid from backing up  Manage stress  Stress can make your symptoms worse or cause stomach acid to back up  · Drink more liquid when you take pills  Drink a full glass of water when you take your pills  Ask your healthcare provider if you can take your pills at least an hour before you go to bed  Follow up with your healthcare provider as directed: You may need ongoing tests or treatment  Write down your questions so you remember to ask them during your visits  © 2017 [address] [facility] Information is for End User's use only and may not be sold, redistributed or otherwise used for commercial purposes  All illustrations and images included in CareNotes® are the copyrighted property of A [employer] M [employer]  or Iain Desai  The above information is an  only  It is not intended as medical advice for individual conditions or treatments  Talk to your doctor, nurse or pharmacist before following any medical regimen to see if it is safe and effective for you

## 2018-11-12 DIAGNOSIS — E78.5 DYSLIPIDEMIA: ICD-10-CM

## 2018-11-12 RX ORDER — ATORVASTATIN CALCIUM 10 MG/1
10 TABLET, FILM COATED ORAL DAILY
Qty: 90 TABLET | Refills: 0 | Status: SHIPPED | OUTPATIENT
Start: 2018-11-12 | End: 2019-05-06 | Stop reason: SDUPTHER

## 2018-11-15 ENCOUNTER — APPOINTMENT (OUTPATIENT)
Dept: LAB | Facility: HOSPITAL | Age: 73
End: 2018-11-15
Attending: INTERNAL MEDICINE
Payer: COMMERCIAL

## 2018-11-15 DIAGNOSIS — D50.9 IRON DEFICIENCY ANEMIA, UNSPECIFIED IRON DEFICIENCY ANEMIA TYPE: ICD-10-CM

## 2018-11-15 LAB
BASOPHILS # BLD AUTO: 0.05 THOUSANDS/ΜL (ref 0–0.1)
BASOPHILS NFR BLD AUTO: 1 % (ref 0–1)
EOSINOPHIL # BLD AUTO: 0.16 THOUSAND/ΜL (ref 0–0.61)
EOSINOPHIL NFR BLD AUTO: 3 % (ref 0–6)
ERYTHROCYTE [DISTWIDTH] IN BLOOD BY AUTOMATED COUNT: 20.4 % (ref 11.6–15.1)
HCT VFR BLD AUTO: 43.4 % (ref 36.5–49.3)
HGB BLD-MCNC: 12.7 G/DL (ref 12–17)
IMM GRANULOCYTES # BLD AUTO: 0.03 THOUSAND/UL (ref 0–0.2)
IMM GRANULOCYTES NFR BLD AUTO: 1 % (ref 0–2)
LYMPHOCYTES # BLD AUTO: 1.43 THOUSANDS/ΜL (ref 0.6–4.47)
LYMPHOCYTES NFR BLD AUTO: 30 % (ref 14–44)
MCH RBC QN AUTO: 23.4 PG (ref 26.8–34.3)
MCHC RBC AUTO-ENTMCNC: 29.3 G/DL (ref 31.4–37.4)
MCV RBC AUTO: 80 FL (ref 82–98)
MONOCYTES # BLD AUTO: 0.47 THOUSAND/ΜL (ref 0.17–1.22)
MONOCYTES NFR BLD AUTO: 10 % (ref 4–12)
NEUTROPHILS # BLD AUTO: 2.71 THOUSANDS/ΜL (ref 1.85–7.62)
NEUTS SEG NFR BLD AUTO: 55 % (ref 43–75)
NRBC BLD AUTO-RTO: 0 /100 WBCS
PLATELET # BLD AUTO: 241 THOUSANDS/UL (ref 149–390)
PMV BLD AUTO: 10.6 FL (ref 8.9–12.7)
RBC # BLD AUTO: 5.42 MILLION/UL (ref 3.88–5.62)
WBC # BLD AUTO: 4.85 THOUSAND/UL (ref 4.31–10.16)

## 2018-11-15 PROCEDURE — 85025 COMPLETE CBC W/AUTO DIFF WBC: CPT

## 2018-11-15 PROCEDURE — 36415 COLL VENOUS BLD VENIPUNCTURE: CPT

## 2018-11-16 ENCOUNTER — OFFICE VISIT (OUTPATIENT)
Dept: INTERNAL MEDICINE CLINIC | Facility: CLINIC | Age: 73
End: 2018-11-16
Payer: COMMERCIAL

## 2018-11-16 VITALS
TEMPERATURE: 98.1 F | OXYGEN SATURATION: 98 % | WEIGHT: 166.6 LBS | SYSTOLIC BLOOD PRESSURE: 150 MMHG | BODY MASS INDEX: 26.15 KG/M2 | HEART RATE: 86 BPM | HEIGHT: 67 IN | DIASTOLIC BLOOD PRESSURE: 88 MMHG

## 2018-11-16 DIAGNOSIS — K21.9 GASTROESOPHAGEAL REFLUX DISEASE WITHOUT ESOPHAGITIS: Chronic | ICD-10-CM

## 2018-11-16 DIAGNOSIS — M47.26 OSTEOARTHRITIS OF SPINE WITH RADICULOPATHY, LUMBAR REGION: Primary | ICD-10-CM

## 2018-11-16 DIAGNOSIS — D50.0 IRON DEFICIENCY ANEMIA DUE TO CHRONIC BLOOD LOSS: ICD-10-CM

## 2018-11-16 DIAGNOSIS — I10 ESSENTIAL HYPERTENSION: Chronic | ICD-10-CM

## 2018-11-16 PROBLEM — D50.9 IRON DEFICIENCY ANEMIA: Status: ACTIVE | Noted: 2018-09-28

## 2018-11-16 PROBLEM — D50.9 MICROCYTIC ANEMIA: Status: RESOLVED | Noted: 2018-08-13 | Resolved: 2018-11-16

## 2018-11-16 PROBLEM — M15.0 PRIMARY OSTEOARTHRITIS INVOLVING MULTIPLE JOINTS: Status: ACTIVE | Noted: 2018-11-16

## 2018-11-16 PROBLEM — M15.9 PRIMARY OSTEOARTHRITIS INVOLVING MULTIPLE JOINTS: Status: ACTIVE | Noted: 2018-11-16

## 2018-11-16 PROCEDURE — 99214 OFFICE O/P EST MOD 30 MIN: CPT | Performed by: INTERNAL MEDICINE

## 2018-11-16 PROCEDURE — 1160F RVW MEDS BY RX/DR IN RCRD: CPT | Performed by: INTERNAL MEDICINE

## 2018-11-16 PROCEDURE — 1036F TOBACCO NON-USER: CPT | Performed by: INTERNAL MEDICINE

## 2018-11-16 PROCEDURE — 3008F BODY MASS INDEX DOCD: CPT | Performed by: INTERNAL MEDICINE

## 2018-11-16 RX ORDER — AMLODIPINE BESYLATE 5 MG/1
5 TABLET ORAL DAILY
Qty: 30 TABLET | Refills: 5 | Status: SHIPPED | OUTPATIENT
Start: 2018-11-16 | End: 2019-03-11 | Stop reason: SDUPTHER

## 2018-11-16 NOTE — PROGRESS NOTES
Assessment/Plan   Problem List Items Addressed This Visit     Essential hypertension (Chronic)    Gastroesophageal reflux disease without esophagitis (Chronic)    Osteoarthritis of spine with radiculopathy, lumbar region - Primary    Iron deficiency anemia    Relevant Orders    Ferritin    Iron    CBC and differential      Hypertension:  Blood pressure is not at goal, despite optimal lifestyle  Today's blood pressure is 150/88  Continue losartan /25 and start amlodipine 5 mg daily with benefits, potential side effects and precautions explained  Reassess hypertension in 3 months  Gastroesophageal reflux disease with hiatal hernia and chronic esophagitis:  Shilpa Waddell has completed his Gastroenterology evaluation and please see notes below for details  He remains asymptomatic, and has transition from PPI therapy to ranitidine 150 twice daily  He wishes to avoid surgery at this time  Plan is anti reflux diet and continue ranitidine and monitor for symptomatic recurrence  Osteoarthritis of the lumbar spine with radiculopathy:  There is some degree of spinal stenosis as confirmed by his physical medicine specialist after recent evaluation at [facility] by Dr Remigio Kaufman  He was also found and iliotibial band syndrome which did respond to conservative therapy and was a major contributor to his symptoms  Spinal stenosis symptoms seem to be minimal to none at this point  What was thought to be radiculopathy was mainly iliotibial band syndrome  He is going to avoid elliptical  he was using as this may have caused his iliotibial band syndrome and is now walking  Iron deficiency anemia, this is due to chronic low-grade blood loss, as indicated on October 31st EGD showing esophagitis, with nonbleeding gastric polyp removed, and evidence of diverticulosis but no lower GI bleeding  He was instructed to start iron therapy daily by his gastroenterologist and just started this yesterday    We reviewed his lab work of November 15th with included in of hemoglobin to 12 7 from 11 8 on August 6, and hematocrit to 43 4 from 30/8 0 5 with unchanged MCV of 80  We also discussed latest guidelines regarding iron therapy  Cathi Cassidy is going to take 325 mg of iron sulfate every other day, with 500 mg of vitamin-C and avoid ice tea or other products containing tea  He will undergo CBC, iron and ferritin in 2 months and will be notified of results  Subjective   Patient ID: Kvng Han is a 72 y o  male  He is here today in follow-up regarding recent workup for iron deficiency anemia, and reassessment of hypertension, and lower extremity radiculopathy  Vitals:    11/16/18 0921   BP: 150/88   Pulse: 86   Temp: 98 1 °F (36 7 °C)   SpO2: 98%     HPI   Cathi Cassidy is feeling well today  We reviewed his EGD and colonoscopy of October 31st showing esophagitis as probable source of chronic low-grade bleeding resulting in iron deficiency anemia detected at last visit  We reviewed his November 15th labs showing improvement and please see the assessment for details  Along the way, he had normal folic acid level, 95 8, B12 level, 446 with low iron at 30 on September 28th  He just started iron therapy yesterday recommended by Gastroenterology  Discussed the details of iron therapy based on latest guidelines  Prior to this H pylori stool assay was negative on August 22nd  He has finished PPI therapy and has been transitioned dependent edema and is asymptomatic  Blood pressure is elevated despite good compliance with lifestyle and medication  We discussed options and amlodipine was started today  His evaluation by his orthopedic group resulted in physical therapy which made his iliotibial band syndrome, diagnosed at that time, worse and then he stopped physical therapy and his symptoms went away  These turned out to be the symptoms originally attributed to radiculopathy    While he does have lumbar spinal stenosis this appears to be asymptomatic  No new problems are reported  The following portions of the patient's history were reviewed and updated as appropriate: allergies, current medications, past family history, past medical history, past social history, past surgical history and problem list     Review of Systems no heartburn    Objective   Physical Exam   Vital signs stable, in no acute distress in good spirits today  Lungs clear  Cardiac exam normal on auscultation  Pulse regular  There is no peripheral edema  Skin without pallor          Patient Active Problem List   Diagnosis    Essential hypertension    Prostatitis, chronic    History of total right hip arthroplasty    Hereditary and idiopathic peripheral neuropathy    Nephrolithiasis    Primary open angle glaucoma    History of abdominal hernia    Chronic gout    Gastroesophageal reflux disease without esophagitis    Rosacea    Diverticulosis    Glaucoma    Hyperlipidemia    Dyslipidemia    Osteoarthritis of spine with radiculopathy, lumbar region    Iron deficiency anemia    Primary osteoarthritis involving multiple joints     Current Outpatient Prescriptions:     acetaminophen (TYLENOL) 325 mg tablet, Take 650 mg by mouth every 6 (six) hours as needed for mild pain, Disp: , Rfl:     allopurinol (ZYLOPRIM) 300 mg tablet, TAKE 1 TABLET DAILY, Disp: 90 tablet, Rfl: 2    amoxicillin (AMOXIL) 500 mg capsule, Take 500 mg by mouth once as needed 4 caps before dental work, Disp: , Rfl:     atorvastatin (LIPITOR) 10 mg tablet, Take 1 tablet (10 mg total) by mouth daily, Disp: 90 tablet, Rfl: 0    gabapentin (NEURONTIN) 300 mg capsule, Take 300 mg by mouth daily at bedtime, Disp: , Rfl:     losartan-hydrochlorothiazide (HYZAAR) 100-25 MG per tablet, Take 1 tablet by mouth daily, Disp: , Rfl:     metroNIDAZOLE (METROGEL) 1 % gel, Apply at bedtime to affected skin, Disp: 60 g, Rfl: 0    naproxen (NAPROSYN) 250 mg tablet, Take 250 mg by mouth 2 (two) times a day as needed for mild pain, Disp: , Rfl:     omeprazole (PriLOSEC) 20 mg delayed release capsule, Take 20 mg by mouth daily, Disp: , Rfl:     oxymetazoline (AFRIN) 0 05 % nasal spray, 2 sprays by Each Nare route 2 (two) times a day, Disp: , Rfl:     ranitidine (ZANTAC) 150 MG capsule, Take 150 mg by mouth daily at bedtime as needed for indigestion or heartburn, Disp: , Rfl:     Saw Palmetto, Serenoa repens, (SAW PALMETTO PO), Take 1 capsule by mouth 3 (three) times a day  , Disp: , Rfl:     tamsulosin (FLOMAX) 0 4 mg, Take 1 capsule (0 4 mg total) by mouth daily at bedtime, Disp: 90 capsule, Rfl: 3    timolol (TIMOPTIC) 0 5 % ophthalmic solution, INSTILL 1 DROP INTO RIGHT EYE IN THE MORNING, Disp: , Rfl: 3    ALPHA LIPOIC ACID PO, Take 1 tablet by mouth daily, Disp: , Rfl:     Multiple Vitamin (MULTIVITAMIN) capsule, Take 1 capsule by mouth daily, Disp: , Rfl:     travoprost (TRAVATAN Z) 0 004 % ophthalmic solution, Administer 1 drop to the right eye daily at bedtime, Disp: , Rfl:

## 2018-12-03 ENCOUNTER — OFFICE VISIT (OUTPATIENT)
Dept: GASTROENTEROLOGY | Facility: MEDICAL CENTER | Age: 73
End: 2018-12-03
Payer: COMMERCIAL

## 2018-12-03 VITALS
WEIGHT: 165 LBS | BODY MASS INDEX: 25.9 KG/M2 | DIASTOLIC BLOOD PRESSURE: 72 MMHG | HEIGHT: 67 IN | TEMPERATURE: 97.3 F | SYSTOLIC BLOOD PRESSURE: 118 MMHG | HEART RATE: 73 BPM

## 2018-12-03 DIAGNOSIS — K21.9 GASTROESOPHAGEAL REFLUX DISEASE WITHOUT ESOPHAGITIS: Primary | Chronic | ICD-10-CM

## 2018-12-03 DIAGNOSIS — D50.8 IRON DEFICIENCY ANEMIA SECONDARY TO INADEQUATE DIETARY IRON INTAKE: ICD-10-CM

## 2018-12-03 DIAGNOSIS — D12.6 TUBULAR ADENOMA OF COLON: ICD-10-CM

## 2018-12-03 PROCEDURE — 99214 OFFICE O/P EST MOD 30 MIN: CPT | Performed by: PHYSICIAN ASSISTANT

## 2018-12-03 PROCEDURE — 4040F PNEUMOC VAC/ADMIN/RCVD: CPT | Performed by: PHYSICIAN ASSISTANT

## 2018-12-03 RX ORDER — DOXYCYCLINE HYCLATE 50 MG/1
324 CAPSULE, GELATIN COATED ORAL
COMMUNITY
End: 2020-02-26 | Stop reason: ALTCHOICE

## 2018-12-03 NOTE — PROGRESS NOTES
[facility] - Outpatient Follow-up Note  Baron Pineda 72 y o  male MRN: [id number]  Encounter: [id number]          ASSESSMENT AND PLAN:      1  Anemia   -  in August he was found to have anemia on routine labs   -iron panel showed iron deficiency anemia with elevated total iron binding capacity   -He has been taking oral iron supplementation   -Fortunately his most recent hemoglobin was within normal limits   -he had an upper endoscopy and colonoscopy which did not reveal source of bleeding aside from mild esophagitis   -he denies any overt GI bleeding   -given that his anemia has resolved, we can hold off on capsule endoscopy however if his anemia were to recur would recommend this  -repeat iron panel and CBC were ordered by his PCP      2  Gastroesophageal reflux disease   -He takes Prilosec every morning  When he does not take this in the morning, he will experience significant GERD symptoms  He is able to continue taking Prilosec daily     -He added Zantac at night as well and he rarely has reflux symptoms  3    History of tubular adenoma of colon   -he had 3 polyps removed and colonoscopy which were tubular adenomas on pathology, repeat colonoscopy is recommended in 3 years  He would prefer to follow up in 1 year as he is feeling well and this is reasonable, he can follow up as needed in the meantime  ____________________________________________________________    SUBJECTIVE:    81-year-old male past medical history of GERD, hypertension, hip replacement presents to the office for follow-up  He reports that he has been taking Prilosec in the morning and Zantac at night and this is controlling his reflux very well, he denies any symptoms currently but states that he does occasionally have breakthrough symptoms but this is very rare  He is asymptomatic from an anemia standpoint, he denies any shortness of breath, palpitations, fatigue, weakness, dizziness or syncope    His most recent blood work about 2 weeks ago showed a normal hemoglobin  He has been taking oral iron supplementation every other day and this has increased his iron level slightly  An upper endoscopy that showed mild esophagitis and a sliding hiatal hernia measuring about 4 cm  He also had a colonoscopy with small internal hemorrhoids, diverticulosis and 3 polyps noted which were removed  Stomach biopsies were negative for H pylori, duodenal biopsies were negative for celiac disease  The colon polyps were tubular adenomas to be 3 year follow-up colonoscopy was recommended  REVIEW OF SYSTEMS IS OTHERWISE NEGATIVE        Historical Information   Past Medical History:   Diagnosis Date    Age-related cataract of both eyes 6/30/2017    Arthritis     Benign colon polyp     /last assessed 6/17/2015    Bilateral inguinal hernia     Cellulitis of leg, left 6/30/2017    Chronic gout 6/30/2017    Essential hypertension 6/30/2017    Gastroesophageal reflux disease without esophagitis 6/30/2017    GERD (gastroesophageal reflux disease)     Gout     Herpes simplex type 1 infection     last assessed 12/18/2014    History of abdominal hernia 6/30/2017    History of pneumonia     History of prostatitis     History of total right hip arthroplasty 6/30/2017    History of total right hip replacement     Hyperlipidemia     Hypertension     Idiopathic peripheral neuropathy 6/30/2017    Kidney stone     Left inguinal hernia     Lyme disease     last assessed 7/10/2017    Nephrolithiasis 6/30/2017    Peripheral neuropathy     Podagra     last assessed 12/5/2014    Primary open angle glaucoma 6/30/2017    Prostatitis, chronic 6/30/2017    Rosacea 6/30/2017     Past Surgical History:   Procedure Laterality Date    ABDOMINAL SURGERY      CATARACT EXTRACTION      COLONOSCOPY      last assessed 6/17/2015    DUPUYTREN / PALMAR FASCIOTOMY      last assessed 6/17/2015    DUPUYTREN CONTRACTURE RELEASE Left  ESOPHAGOGASTRODUODENOSCOPY      HERNIA REPAIR Right     inguinal    HERNIA REPAIR Left 2/9/2017    Procedure: REPAIR HERNIA INGUINAL, LAPAROSCOPIC WITH MESH;  Surgeon: Rox Orozco MD;  Location: [facility] OR;  Service:    [facility] Bilateral 2011    with mesh done at [facility]      hip replacement    LITHOTRIPSY      MT COLONOSCOPY FLX DX W/COLLJ Anh 1978 PFRMD N/A 10/31/2018    Procedure: EGD AND COLONOSCOPY;  Surgeon: Nader Wong MD;  Location: [facility] GI LAB;   Service: Gastroenterology    TONSILLECTOMY      TOTAL HIP ARTHROPLASTY Right     last assessed 12/5/2014     Social History   History   Alcohol Use    0 6 oz/week    1 Glasses of wine per week     Comment: daily with dinner/occas beer     History   Drug Use No     History   Smoking Status    Never Smoker   Smokeless Tobacco    Never Used     Comment: per allscripts ' former smoker / never a smoker '     Family History   Problem Relation Age of Onset    Coronary artery disease Mother     Cancer Father        Meds/Allergies       Current Outpatient Prescriptions:     acetaminophen (TYLENOL) 325 mg tablet    allopurinol (ZYLOPRIM) 300 mg tablet    ALPHA LIPOIC ACID PO    amLODIPine (NORVASC) 5 mg tablet    amoxicillin (AMOXIL) 500 mg capsule    atorvastatin (LIPITOR) 10 mg tablet    ferrous gluconate (FERGON) 324 mg tablet    gabapentin (NEURONTIN) 300 mg capsule    losartan-hydrochlorothiazide (HYZAAR) 100-25 MG per tablet    metroNIDAZOLE (METROGEL) 1 % gel    Multiple Vitamin (MULTIVITAMIN) capsule    naproxen (NAPROSYN) 250 mg tablet    omeprazole (PriLOSEC) 20 mg delayed release capsule    oxymetazoline (AFRIN) 0 05 % nasal spray    ranitidine (ZANTAC) 150 MG capsule    Saw Palmetto, Serenoa repens, (SAW PALMETTO PO)    tamsulosin (FLOMAX) 0 4 mg    timolol (TIMOPTIC) 0 5 % ophthalmic solution    travoprost (TRAVATAN Z) 0 004 % ophthalmic solution    No Known Allergies        Objective     Blood pressure 118/72, pulse 73, temperature (!) 97 3 °F (36 3 °C), temperature source Tympanic, height 5' 7" (1 702 m), weight 74 8 kg (165 lb)  PHYSICAL EXAM:      Physical Exam   Constitutional: He is oriented to person, place, and time  He appears well-developed and well-nourished  No distress  HENT:   Head: Normocephalic and atraumatic  Eyes: Right eye exhibits no discharge  Left eye exhibits no discharge  No scleral icterus  Neck: Neck supple  No tracheal deviation present  Cardiovascular: Normal rate, regular rhythm, normal heart sounds and intact distal pulses  Exam reveals no gallop and no friction rub  No murmur heard  Pulmonary/Chest: Effort normal and breath sounds normal  No respiratory distress  He has no wheezes  He has no rales  Abdominal: Soft  Bowel sounds are normal  He exhibits no distension  There is no tenderness  There is no rebound and no guarding  Neurological: He is alert and oriented to person, place, and time  Skin: Skin is warm and dry  Psychiatric: He has a normal mood and affect  Lab Results:   No visits with results within 1 Day(s) from this visit     Latest known visit with results is:   Appointment on 11/15/2018   Component Date Value    WBC 11/15/2018 4 85     RBC 11/15/2018 5 42     Hemoglobin 11/15/2018 12 7     Hematocrit 11/15/2018 43 4     MCV 11/15/2018 80*    MCH 11/15/2018 23 4*    MCHC 11/15/2018 29 3*    RDW 11/15/2018 20 4*    MPV 11/15/2018 10 6     Platelets 45/07/7646 241     nRBC 11/15/2018 0     Neutrophils Relative 11/15/2018 55     Immat GRANS % 11/15/2018 1     Lymphocytes Relative 11/15/2018 30     Monocytes Relative 11/15/2018 10     Eosinophils Relative 11/15/2018 3     Basophils Relative 11/15/2018 1     Neutrophils Absolute 11/15/2018 2 71     Immature Grans Absolute 11/15/2018 0 03     Lymphocytes Absolute 11/15/2018 1 43     Monocytes Absolute 11/15/2018 0 47     Eosinophils Absolute 11/15/2018 0 16     Basophils Absolute 11/15/2018 0 05          Radiology Results:   No results found

## 2019-01-07 ENCOUNTER — HOSPITAL ENCOUNTER (OUTPATIENT)
Dept: RADIOLOGY | Facility: HOSPITAL | Age: 74
Discharge: HOME | End: 2019-01-07
Payer: COMMERCIAL

## 2019-01-07 ENCOUNTER — APPOINTMENT (OUTPATIENT)
Dept: LAB | Facility: HOSPITAL | Age: 74
End: 2019-01-07
Payer: COMMERCIAL

## 2019-01-07 DIAGNOSIS — D50.0 IRON DEFICIENCY ANEMIA DUE TO CHRONIC BLOOD LOSS: ICD-10-CM

## 2019-01-07 DIAGNOSIS — Z12.5 ENCOUNTER FOR SCREENING FOR MALIGNANT NEOPLASM OF PROSTATE: ICD-10-CM

## 2019-01-07 DIAGNOSIS — M10.9 GOUT, UNSPECIFIED CAUSE, UNSPECIFIED CHRONICITY, UNSPECIFIED SITE: ICD-10-CM

## 2019-01-07 DIAGNOSIS — N20.0 CALCULUS OF KIDNEY: ICD-10-CM

## 2019-01-07 LAB — PSA SERPL-MCNC: 1.5 NG/ML (ref 0–4)

## 2019-01-07 PROCEDURE — G0103 PSA SCREENING: HCPCS

## 2019-01-07 PROCEDURE — 36415 COLL VENOUS BLD VENIPUNCTURE: CPT

## 2019-01-07 PROCEDURE — 74018 RADEX ABDOMEN 1 VIEW: CPT

## 2019-01-07 RX ORDER — ALLOPURINOL 300 MG/1
300 TABLET ORAL DAILY
Qty: 90 TABLET | Refills: 3 | Status: SHIPPED | OUTPATIENT
Start: 2019-01-07 | End: 2020-01-07 | Stop reason: SDUPTHER

## 2019-01-09 DIAGNOSIS — Z12.5 ENCOUNTER FOR SCREENING FOR MALIGNANT NEOPLASM OF PROSTATE: ICD-10-CM

## 2019-01-09 DIAGNOSIS — N20.0 CALCULUS OF KIDNEY: ICD-10-CM

## 2019-01-11 ENCOUNTER — OFFICE VISIT (OUTPATIENT)
Dept: UROLOGY | Facility: CLINIC | Age: 74
End: 2019-01-11
Payer: COMMERCIAL

## 2019-01-11 VITALS
BODY MASS INDEX: 25.74 KG/M2 | SYSTOLIC BLOOD PRESSURE: 138 MMHG | HEIGHT: 67 IN | HEART RATE: 92 BPM | WEIGHT: 164 LBS | DIASTOLIC BLOOD PRESSURE: 82 MMHG

## 2019-01-11 DIAGNOSIS — N20.0 NEPHROLITHIASIS: Primary | Chronic | ICD-10-CM

## 2019-01-11 PROCEDURE — 99213 OFFICE O/P EST LOW 20 MIN: CPT | Performed by: PHYSICIAN ASSISTANT

## 2019-01-11 NOTE — PROGRESS NOTES
1  Nephrolithiasis  XR abdomen 1 view kub       Assessment and plan:       1   nephrolithiasis -- managed by Dr Savannah Hernandez   - stone burden remained stable with bilateral intrarenal calculi measuring up to 5 mm  - reviewed his most recent imaging  We discussed his options of observation, ESWL, and ureteroscopy  Patient elects for continued observation  He will follow up in 1 year with a KUB prior to visit  We discussed the importance of proper hydration dietary modifications in order to minimize future stone formation  Patient is aware to contact us in the interim with any signs and symptoms of passing stone  All questions answered  2  History of prostatitis   - we reviewed symptomatic measures management prostatitis  He is aware to contact our office should he become symptomatic prostatitis again in the future  3  Routine prostate cancer screening   - recent PSA 1 5 (01/07/2019)  Juan Wolf PA-C      Chief Complaint     Chief Complaint   Patient presents with    Benign Prostatic Hypertrophy    Nephrolithiasis         History of Present Illness     Curt Balderrama is a 72 y o  male patient of Dr Savannah Hernandez with a history of nephrolithiasis, prostatitis, and routine prostate cancer screening  Patient has longstanding history of nephrolithiasis  He has required ureteroscopy and lithotripsy before in the past    Patient had a recent KUB (01/07/2019) which revealed stable bilateral renal calculi measuring up to 4 mm on the left flank 5 mm on the right  Patient does have recurrent pass of prostatitis  He has a prescription for antibiotics for self start treatment with symptoms of prostatitis  Patient denies any recurrence of symptoms over the past year  Patient was managed on Rapaflo daily  He was trans patient to tamsulosin  Patient is overall comfortable with urination  Feels like he has a good stream, feels empty after urination, has nocturia 1-2 times nightly    Denies any dysuria, gross hematuria, or urinary infections  Patient had a recent PSA of 1 5 (01/07/2019)  Laboratory     Lab Results   Component Value Date    CREATININE 0 96 08/06/2018       Lab Results   Component Value Date    PSA 1 5 01/07/2019    PSA 2 0 10/26/2017    PSA 1 4 03/08/2016       Review of Systems     Review of Systems   Constitutional: Negative for activity change, appetite change, chills, diaphoresis, fatigue, fever and unexpected weight change  Respiratory: Negative for chest tightness and shortness of breath  Cardiovascular: Negative for chest pain, palpitations and leg swelling  Gastrointestinal: Negative for abdominal distention, abdominal pain, constipation, diarrhea, nausea and vomiting  Genitourinary: Negative for decreased urine volume, difficulty urinating, dysuria, enuresis, flank pain, frequency, genital sores, hematuria and urgency  Musculoskeletal: Negative for back pain, gait problem and myalgias  Skin: Negative for color change, pallor, rash and wound  Psychiatric/Behavioral: Negative for behavioral problems  The patient is not nervous/anxious  AUA SYMPTOM SCORE      Most Recent Value   AUA SYMPTOM SCORE   How often have you had a sensation of not emptying your bladder completely after you finished urinating? 1   How often have you had to urinate again less than two hours after you finished urinating? 2   How often have you found you stopped and started again several times when you urinate? 3   How often have you found it difficult to postpone urination? 4   How often have you had a weak urinary stream?  1   How often have you had to push or strain to begin urination? 0   How many times did you most typically get up to urinate from the time you went to bed at night until the time you got up in the morning?   2   Quality of Life: If you were to spend the rest of your life with your urinary condition just the way it is now, how would you feel about that?  1   AUA SYMPTOM SCORE  13              Allergies     No Known Allergies    Physical Exam     Physical Exam   Constitutional: He is oriented to person, place, and time  He appears well-developed and well-nourished  No distress  HENT:   Head: Normocephalic and atraumatic  Eyes: Conjunctivae are normal    Neck: Normal range of motion  No tracheal deviation present  Pulmonary/Chest: Effort normal    Musculoskeletal: Normal range of motion  He exhibits no edema or deformity  Neurological: He is alert and oriented to person, place, and time  Skin: Skin is warm and dry  No rash noted  He is not diaphoretic  No erythema  Psychiatric: He has a normal mood and affect   His behavior is normal          Vital Signs     Vitals:    01/11/19 0947   BP: 138/82   BP Location: Left arm   Patient Position: Sitting   Cuff Size: Adult   Pulse: 92   Weight: 74 4 kg (164 lb)   Height: 5' 7" (1 702 m)         Current Medications       Current Outpatient Prescriptions:     acetaminophen (TYLENOL) 325 mg tablet, Take 650 mg by mouth every 6 (six) hours as needed for mild pain, Disp: , Rfl:     allopurinol (ZYLOPRIM) 300 mg tablet, Take 1 tablet (300 mg total) by mouth daily, Disp: 90 tablet, Rfl: 3    ALPHA LIPOIC ACID PO, Take 1 tablet by mouth daily, Disp: , Rfl:     amLODIPine (NORVASC) 5 mg tablet, Take 1 tablet (5 mg total) by mouth daily, Disp: 30 tablet, Rfl: 5    atorvastatin (LIPITOR) 10 mg tablet, Take 1 tablet (10 mg total) by mouth daily, Disp: 90 tablet, Rfl: 0    gabapentin (NEURONTIN) 300 mg capsule, Take 300 mg by mouth daily at bedtime, Disp: , Rfl:     influenza vaccine, high-dose (FLUZONE HIGH-DOSE) 0 5 ML RAHEEM, TO BE ADMINISTERED BY PHARMACIST FOR IMMUNIZATION, Disp: , Rfl:     losartan-hydrochlorothiazide (HYZAAR) 100-25 MG per tablet, Take 1 tablet by mouth daily, Disp: , Rfl:     metroNIDAZOLE (METROGEL) 1 % gel, Apply at bedtime to affected skin, Disp: 60 g, Rfl: 0    Multiple Vitamin (MULTIVITAMIN) capsule, Take 1 capsule by mouth daily, Disp: , Rfl:     naproxen (NAPROSYN) 250 mg tablet, Take 250 mg by mouth 2 (two) times a day as needed for mild pain, Disp: , Rfl:     omeprazole (PriLOSEC) 20 mg delayed release capsule, Take 20 mg by mouth daily, Disp: , Rfl:     oxymetazoline (AFRIN) 0 05 % nasal spray, 2 sprays by Each Nare route 2 (two) times a day, Disp: , Rfl:     ranitidine (ZANTAC) 150 MG capsule, Take 150 mg by mouth daily at bedtime as needed for indigestion or heartburn, Disp: , Rfl:     Saw Palmetto, Serenoa repens, (SAW PALMETTO PO), Take 1 capsule by mouth 3 (three) times a day  , Disp: , Rfl:     tamsulosin (FLOMAX) 0 4 mg, Take 1 capsule (0 4 mg total) by mouth daily at bedtime, Disp: 90 capsule, Rfl: 3    timolol (TIMOPTIC) 0 5 % ophthalmic solution, INSTILL 1 DROP INTO RIGHT EYE IN THE MORNING, Disp: , Rfl: 3    amoxicillin (AMOXIL) 500 mg capsule, Take 500 mg by mouth once as needed 4 caps before dental work, Disp: , Rfl:     ferrous gluconate (FERGON) 324 mg tablet, Take 324 mg by mouth daily with breakfast, Disp: , Rfl:     travoprost (TRAVATAN Z) 0 004 % ophthalmic solution, Administer 1 drop to the right eye daily at bedtime, Disp: , Rfl:       Active Problems     Patient Active Problem List   Diagnosis    Essential hypertension    Prostatitis, chronic    History of total right hip arthroplasty    Hereditary and idiopathic peripheral neuropathy    Nephrolithiasis    Primary open angle glaucoma    History of abdominal hernia    Chronic gout    Gastroesophageal reflux disease without esophagitis    Rosacea    Diverticulosis    Glaucoma    Hyperlipidemia    Dyslipidemia    Osteoarthritis of spine with radiculopathy, lumbar region    Iron deficiency anemia    Primary osteoarthritis involving multiple joints       Past Medical History     Past Medical History:   Diagnosis Date    Age-related cataract of both eyes 6/30/2017    Arthritis     Benign colon polyp     Malu Olson assessed 6/17/2015    Bilateral inguinal hernia     Cellulitis of leg, left 6/30/2017    Chronic gout 6/30/2017    Essential hypertension 6/30/2017    Gastroesophageal reflux disease without esophagitis 6/30/2017    GERD (gastroesophageal reflux disease)     Gout     Herpes simplex type 1 infection     last assessed 12/18/2014    History of abdominal hernia 6/30/2017    History of pneumonia     History of prostatitis     History of total right hip arthroplasty 6/30/2017    History of total right hip replacement     Hyperlipidemia     Hypertension     Idiopathic peripheral neuropathy 6/30/2017    Kidney stone     Left inguinal hernia     Lyme disease     last assessed 7/10/2017    Nephrolithiasis 6/30/2017    Peripheral neuropathy     Podagra     last assessed 12/5/2014    Primary open angle glaucoma 6/30/2017    Prostatitis, chronic 6/30/2017    Rosacea 6/30/2017       Surgical History     Past Surgical History:   Procedure Laterality Date    ABDOMINAL SURGERY      CATARACT EXTRACTION      COLONOSCOPY      last assessed 6/17/2015    DUPUYTREN / PALMAR FASCIOTOMY      last assessed 6/17/2015    DUPUYTREN CONTRACTURE RELEASE Left     ESOPHAGOGASTRODUODENOSCOPY      HERNIA REPAIR Right     inguinal    HERNIA REPAIR Left 2/9/2017    Procedure: REPAIR HERNIA INGUINAL, LAPAROSCOPIC WITH MESH;  Surgeon: Oscar Obando MD;  Location: [facility] OR;  Service:    [facility] Bilateral 2011    with mesh done at [facility]      hip replacement    LITHOTRIPSY      MI COLONOSCOPY FLX DX W/JUSTIN Kamara 1976 PFRMD N/A 10/31/2018    Procedure: EGD AND COLONOSCOPY;  Surgeon: Rodo Martin MD;  Location: [facility] GI LAB;   Service: Gastroenterology    TONSILLECTOMY      TOTAL HIP ARTHROPLASTY Right     last assessed 12/5/2014       Family History     Family History   Problem Relation Age of Onset    Coronary artery disease Mother     Cancer Father Social History     Social History       Radiology   ABDOMEN     INDICATION:   N20 0: Calculus of kidney      COMPARISON:  X-ray of the abdomen from October 26, 2017      VIEWS:  AP supine        FINDINGS: Redemonstrated are bilateral renal calculi  On the left side, there are calculi projecting over the upper, mid and lower poles of the kidney with the largest calculus in the mid pole measuring 4 mm  On the right side, there are multiple   calculi with the largest projecting over the upper pole measuring 4 to 5 mm as well  The findings are unchanged when compared to the prior study      No radiopaque ureteral calculi identified      Nonobstructive bowel gas pattern      Redemonstrated dextroscoliosis and multiple left lateral osteophytes with asymmetric left-sided disc space narrowing  Right hip arthroplasty  Surgical closure device is again noted in the right inguinal region  Surgical clips project over the left   pelvis  There are stable calcifications projecting over the pubic symphysis presumably prostatic in location      IMPRESSION:     Stable bilateral renal calculi measuring up to 4 mm on the left and 5 mm on the right

## 2019-02-07 ENCOUNTER — TELEPHONE (OUTPATIENT)
Dept: UROLOGY | Facility: MEDICAL CENTER | Age: 74
End: 2019-02-07

## 2019-02-07 NOTE — TELEPHONE ENCOUNTER
Spoke with patient and passed along advice from [address]  Patient states he took an oxycontin this morning, slept for 6 hrs and is feeling better than before  He is disappointed in not being ordered narcotic  He states narcotics are the only way he is going to power thru the pain  Advised patient we can order CT renal stone study, however, patient does not want to spend medical money on expensive diagnostic imaging as he stated  Reviewed symptom management recommended by provider and patient states he is very familiar as he has a long standing history of stones

## 2019-02-07 NOTE — TELEPHONE ENCOUNTER
Patient was recently seen in office 1/11/19  Has long-standing history of renal calculi and managed by Dr Landeros Bone  Please advise if you would like any imaging?

## 2019-02-07 NOTE — TELEPHONE ENCOUNTER
We typically do not dispense narcotic pain medication unless there is radiographic evidence of an obstructing stone  Would recommend hydration, Tylenol, ibuprofen, and heating pad for symptom management  Patient can be scheduled for CT stone study to evaluate for an obstructing stone

## 2019-02-07 NOTE — TELEPHONE ENCOUNTER
Patient would like a prescription for pain meds called in to [facility] on [address] in [place]  He believes he has a kidney stone that is about to pass  He stated he's had many of these before  Please call him to advise when rx is called in to pharmacy  He can be reached at [phone]

## 2019-02-09 ENCOUNTER — HOSPITAL ENCOUNTER (EMERGENCY)
Facility: HOSPITAL | Age: 74
Discharge: HOME | End: 2019-02-09
Attending: EMERGENCY MEDICINE
Payer: COMMERCIAL

## 2019-02-09 ENCOUNTER — APPOINTMENT (EMERGENCY)
Dept: CT IMAGING | Facility: HOSPITAL | Age: 74
End: 2019-02-09
Payer: COMMERCIAL

## 2019-02-09 VITALS
RESPIRATION RATE: 16 BRPM | DIASTOLIC BLOOD PRESSURE: 80 MMHG | HEART RATE: 61 BPM | SYSTOLIC BLOOD PRESSURE: 162 MMHG | WEIGHT: 151.46 LBS | BODY MASS INDEX: 23.72 KG/M2 | TEMPERATURE: 98.1 F | OXYGEN SATURATION: 95 %

## 2019-02-09 DIAGNOSIS — N20.1 RIGHT URETERAL STONE: Primary | ICD-10-CM

## 2019-02-09 LAB
ANION GAP SERPL CALCULATED.3IONS-SCNC: 12 MMOL/L (ref 4–13)
BACTERIA UR QL AUTO: ABNORMAL /HPF
BILIRUB UR QL STRIP: NEGATIVE
BUN SERPL-MCNC: 19 MG/DL (ref 5–25)
CALCIUM SERPL-MCNC: 9 MG/DL (ref 8.3–10.1)
CHLORIDE SERPL-SCNC: 103 MMOL/L (ref 100–108)
CLARITY UR: CLEAR
CO2 SERPL-SCNC: 28 MMOL/L (ref 21–32)
COLOR UR: YELLOW
CREAT SERPL-MCNC: 1.55 MG/DL (ref 0.6–1.3)
GFR SERPL CREATININE-BSD FRML MDRD: 44 ML/MIN/1.73SQ M
GLUCOSE SERPL-MCNC: 125 MG/DL (ref 65–140)
GLUCOSE UR STRIP-MCNC: NEGATIVE MG/DL
HGB UR QL STRIP.AUTO: ABNORMAL
KETONES UR STRIP-MCNC: ABNORMAL MG/DL
LEUKOCYTE ESTERASE UR QL STRIP: NEGATIVE
NITRITE UR QL STRIP: NEGATIVE
NON-SQ EPI CELLS URNS QL MICRO: ABNORMAL /HPF
PH UR STRIP.AUTO: 6.5 [PH] (ref 4.5–8)
POTASSIUM SERPL-SCNC: 3.4 MMOL/L (ref 3.5–5.3)
PROT UR STRIP-MCNC: NEGATIVE MG/DL
RBC #/AREA URNS AUTO: ABNORMAL /HPF
SODIUM SERPL-SCNC: 143 MMOL/L (ref 136–145)
SP GR UR STRIP.AUTO: 1.02 (ref 1–1.03)
UROBILINOGEN UR QL STRIP.AUTO: 0.2 E.U./DL
WBC #/AREA URNS AUTO: ABNORMAL /HPF

## 2019-02-09 PROCEDURE — 96375 TX/PRO/DX INJ NEW DRUG ADDON: CPT

## 2019-02-09 PROCEDURE — 74176 CT ABD & PELVIS W/O CONTRAST: CPT

## 2019-02-09 PROCEDURE — 99284 EMERGENCY DEPT VISIT MOD MDM: CPT

## 2019-02-09 PROCEDURE — 96374 THER/PROPH/DIAG INJ IV PUSH: CPT

## 2019-02-09 PROCEDURE — 96361 HYDRATE IV INFUSION ADD-ON: CPT

## 2019-02-09 PROCEDURE — 36415 COLL VENOUS BLD VENIPUNCTURE: CPT | Performed by: EMERGENCY MEDICINE

## 2019-02-09 PROCEDURE — 81001 URINALYSIS AUTO W/SCOPE: CPT | Performed by: EMERGENCY MEDICINE

## 2019-02-09 PROCEDURE — 80048 BASIC METABOLIC PNL TOTAL CA: CPT | Performed by: EMERGENCY MEDICINE

## 2019-02-09 RX ORDER — MORPHINE SULFATE 4 MG/ML
4 INJECTION, SOLUTION INTRAMUSCULAR; INTRAVENOUS ONCE
Status: COMPLETED | OUTPATIENT
Start: 2019-02-09 | End: 2019-02-09

## 2019-02-09 RX ORDER — KETOROLAC TROMETHAMINE 30 MG/ML
15 INJECTION, SOLUTION INTRAMUSCULAR; INTRAVENOUS ONCE
Status: COMPLETED | OUTPATIENT
Start: 2019-02-09 | End: 2019-02-09

## 2019-02-09 RX ORDER — ONDANSETRON 4 MG/1
4 TABLET, ORALLY DISINTEGRATING ORAL EVERY 8 HOURS PRN
Qty: 10 TABLET | Refills: 0 | Status: SHIPPED | OUTPATIENT
Start: 2019-02-09 | End: 2020-02-26 | Stop reason: ALTCHOICE

## 2019-02-09 RX ORDER — ONDANSETRON 2 MG/ML
4 INJECTION INTRAMUSCULAR; INTRAVENOUS ONCE
Status: COMPLETED | OUTPATIENT
Start: 2019-02-09 | End: 2019-02-09

## 2019-02-09 RX ORDER — OXYCODONE HYDROCHLORIDE AND ACETAMINOPHEN 5; 325 MG/1; MG/1
1 TABLET ORAL EVERY 4 HOURS PRN
Qty: 15 TABLET | Refills: 0 | Status: SHIPPED | OUTPATIENT
Start: 2019-02-09 | End: 2019-02-19

## 2019-02-09 RX ADMIN — MORPHINE SULFATE 4 MG: 4 INJECTION INTRAVENOUS at 09:50

## 2019-02-09 RX ADMIN — KETOROLAC TROMETHAMINE 15 MG: 30 INJECTION, SOLUTION INTRAMUSCULAR at 09:55

## 2019-02-09 RX ADMIN — SODIUM CHLORIDE 1000 ML: 0.9 INJECTION, SOLUTION INTRAVENOUS at 09:50

## 2019-02-09 RX ADMIN — ONDANSETRON 4 MG: 2 INJECTION INTRAMUSCULAR; INTRAVENOUS at 09:57

## 2019-02-09 NOTE — ED PROVIDER NOTES
History  Chief Complaint   Patient presents with    Flank Pain     Right sided flank pain radiates to right side of abdomen onset 2 days ago  Hx of kidney stones  Currently has stones  Also reports n/v      72-year-old male with a history of multiple bilateral kidney stones requiring basket extractions and lithotripsy in the past, hypertension presents to the emergency department with right flank pain radiating to the lower abdomen over the last 2 days  This pain is similar to his previous kidney stones  He states the pain was coming and going, however since last evening the pain is been constant  He has had nausea and dry heaving  No fevers or chills  No dysuria  Took 1 leftover oxycodone with temporary relief  History provided by:  Patient   used: No    Flank Pain   Pain location:  R flank  Pain quality: aching    Pain radiates to:  RLQ  Pain severity:  Unable to specify  Onset quality:  Gradual  Duration:  2 days  Timing:  Intermittent  Progression:  Worsening  Chronicity:  Recurrent  Context: not awakening from sleep, not eating, not previous surgeries, not recent illness, not suspicious food intake and not trauma    Relieved by: Temporarily relieved with oxycodone  Worsened by:  Nothing  Associated symptoms: nausea and vomiting    Associated symptoms: no anorexia, no belching, no chest pain, no chills, no constipation, no cough, no diarrhea, no dysuria, no fatigue, no fever, no flatus, no hematemesis, no hematochezia, no hematuria, no melena, no shortness of breath, no sore throat, no vaginal bleeding and no vaginal discharge    Vomiting:     Emesis appearance: Phlegm  Number of occurrences:  1    Severity:  Unable to specify    Timing:  Intermittent  Risk factors: being elderly    Risk factors: has not had multiple surgeries, no NSAID use and not obese        Prior to Admission Medications   Prescriptions Last Dose Informant Patient Reported? Taking?    ALPHA LIPOIC ACID PO Self Yes No   Sig: Take 1 tablet by mouth daily   Multiple Vitamin (MULTIVITAMIN) capsule  Self Yes No   Sig: Take 1 capsule by mouth daily   Saw Palmetto, Serenoa repens, (SAW PALMETTO PO)  Self Yes No   Sig: Take 1 capsule by mouth 3 (three) times a day     acetaminophen (TYLENOL) 325 mg tablet  Self Yes No   Sig: Take 650 mg by mouth every 6 (six) hours as needed for mild pain   allopurinol (ZYLOPRIM) 300 mg tablet   No No   Sig: Take 1 tablet (300 mg total) by mouth daily   amLODIPine (NORVASC) 5 mg tablet   No No   Sig: Take 1 tablet (5 mg total) by mouth daily   amoxicillin (AMOXIL) 500 mg capsule  Self Yes No   Sig: Take 500 mg by mouth once as needed 4 caps before dental work   atorvastatin (LIPITOR) 10 mg tablet   No No   Sig: Take 1 tablet (10 mg total) by mouth daily   ferrous gluconate (FERGON) 324 mg tablet   Yes No   Sig: Take 324 mg by mouth daily with breakfast   gabapentin (NEURONTIN) 300 mg capsule  Self Yes No   Sig: Take 300 mg by mouth daily at bedtime   influenza vaccine, high-dose (FLUZONE HIGH-DOSE) 0 5 ML RAHEEM   Yes No   Sig: TO BE ADMINISTERED BY PHARMACIST FOR IMMUNIZATION   losartan-hydrochlorothiazide (HYZAAR) 100-25 MG per tablet  Self Yes No   Sig: Take 1 tablet by mouth daily   metroNIDAZOLE (METROGEL) 1 % gel  Self No No   Sig: Apply at bedtime to affected skin   naproxen (NAPROSYN) 250 mg tablet  Self Yes No   Sig: Take 250 mg by mouth 2 (two) times a day as needed for mild pain   omeprazole (PriLOSEC) 20 mg delayed release capsule  Self Yes No   Sig: Take 20 mg by mouth daily   oxymetazoline (AFRIN) 0 05 % nasal spray   Yes No   Si sprays by Each Nare route 2 (two) times a day   ranitidine (ZANTAC) 150 MG capsule  Self Yes No   Sig: Take 150 mg by mouth daily at bedtime as needed for indigestion or heartburn   tamsulosin (FLOMAX) 0 4 mg   No No   Sig: Take 1 capsule (0 4 mg total) by mouth daily at bedtime   timolol (TIMOPTIC) 0 5 % ophthalmic solution  Self Yes No   Sig: INSTILL 1 DROP INTO RIGHT EYE IN THE MORNING   travoprost (TRAVATAN Z) 0 004 % ophthalmic solution  Self Yes No   Sig: Administer 1 drop to the right eye daily at bedtime      Facility-Administered Medications: None       Past Medical History:   Diagnosis Date    Age-related cataract of both eyes 6/30/2017    Arthritis     Benign colon polyp     /last assessed 6/17/2015    Bilateral inguinal hernia     Cellulitis of leg, left 6/30/2017    Chronic gout 6/30/2017    Essential hypertension 6/30/2017    Gastroesophageal reflux disease without esophagitis 6/30/2017    GERD (gastroesophageal reflux disease)     Gout     Herpes simplex type 1 infection     last assessed 12/18/2014    History of abdominal hernia 6/30/2017    History of pneumonia     History of prostatitis     History of total right hip arthroplasty 6/30/2017    History of total right hip replacement     Hyperlipidemia     Hypertension     Idiopathic peripheral neuropathy 6/30/2017    Kidney stone     Left inguinal hernia     Lyme disease     last assessed 7/10/2017    Nephrolithiasis 6/30/2017    Peripheral neuropathy     Podagra     last assessed 12/5/2014    Primary open angle glaucoma 6/30/2017    Prostatitis, chronic 6/30/2017    Rosacea 6/30/2017       Past Surgical History:   Procedure Laterality Date    ABDOMINAL SURGERY      CATARACT EXTRACTION      COLONOSCOPY      last assessed 6/17/2015    DUPUYTREN / PALMAR FASCIOTOMY      last assessed 6/17/2015    DUPUYTREN CONTRACTURE RELEASE Left     ESOPHAGOGASTRODUODENOSCOPY      HERNIA REPAIR Right     inguinal    HERNIA REPAIR Left 2/9/2017    Procedure: REPAIR HERNIA INGUINAL, LAPAROSCOPIC WITH MESH;  Surgeon: Vivian Bassett MD;  Location: [facility];  Service:    [facility] Bilateral 2011    with mesh done at [address]      hip replacement    LITHOTRIPSY      CT COLONOSCOPY FLX DX W/COLLJ SPEC WHEN PFRMD N/A 10/31/2018    Procedure: EGD AND COLONOSCOPY;  Surgeon: Lake Conrad MD;  Location: [facility] GI [facility]; Service: Gastroenterology    TONSILLECTOMY      TOTAL HIP ARTHROPLASTY Right     last assessed 12/5/2014       Family History   Problem Relation Age of Onset    Coronary artery disease Mother     Cancer Father      I have reviewed and agree with the history as documented  Social History   Substance Use Topics    Smoking status: Never Smoker    Smokeless tobacco: Never Used      Comment: per allscripts ' former smoker / never a smoker '    Alcohol use 0 6 oz/week     1 Glasses of wine per week      Comment: daily with dinner/occas beer        Review of Systems   Constitutional: Negative  Negative for chills, fatigue and fever  HENT: Negative  Negative for sore throat  Eyes: Negative  Respiratory: Negative  Negative for cough and shortness of breath  Cardiovascular: Negative  Negative for chest pain  Gastrointestinal: Positive for nausea and vomiting  Negative for anorexia, constipation, diarrhea, flatus, hematemesis, hematochezia and melena  Genitourinary: Positive for flank pain  Negative for decreased urine volume, difficulty urinating, discharge, dysuria, frequency, hematuria, penile pain, penile swelling, scrotal swelling, testicular pain, urgency, vaginal bleeding and vaginal discharge  Musculoskeletal: Negative for neck pain  Skin: Negative  Allergic/Immunologic: Negative  Neurological: Negative  Negative for weakness, numbness and headaches  Hematological: Negative  Psychiatric/Behavioral: Negative  All other systems reviewed and are negative  Physical Exam  Physical Exam   Constitutional: He is oriented to person, place, and time  He appears well-developed and well-nourished  Non-toxic appearance  He does not have a sickly appearance  He does not appear ill  No distress  HENT:   Head: Normocephalic and atraumatic     Right Ear: External ear normal    Left Ear: External ear normal    Eyes: Pupils are equal, round, and reactive to light  Conjunctivae are normal  No scleral icterus  Cardiovascular: Normal rate, regular rhythm and normal heart sounds  Pulmonary/Chest: Effort normal and breath sounds normal    Abdominal: Soft  Normal appearance and bowel sounds are normal  He exhibits no distension and no mass  There is no tenderness  There is CVA tenderness (Right)  No hernia  Neurological: He is alert and oriented to person, place, and time  He has normal strength and normal reflexes  He exhibits normal muscle tone  Skin: Skin is warm and dry  No rash noted  He is not diaphoretic  No erythema  No pallor  Psychiatric: He has a normal mood and affect  Nursing note and vitals reviewed        Vital Signs  ED Triage Vitals [02/09/19 0915]   Temperature Pulse Respirations Blood Pressure SpO2   98 1 °F (36 7 °C) 82 16 (!) 148/109 97 %      Temp Source Heart Rate Source Patient Position - Orthostatic VS BP Location FiO2 (%)   Oral -- -- -- --      Pain Score       6           Vitals:    02/09/19 0915 02/09/19 1045   BP: (!) 148/109 162/80   Pulse: 82 61       Visual Acuity      ED Medications  Medications   sodium chloride 0 9 % bolus 1,000 mL (0 mL Intravenous Stopped 2/9/19 1151)   ketorolac (TORADOL) injection 15 mg (15 mg Intravenous Given 2/9/19 0955)   morphine (PF) 4 mg/mL injection 4 mg (4 mg Intravenous Given 2/9/19 0950)   ondansetron (ZOFRAN) injection 4 mg (4 mg Intravenous Given 2/9/19 0957)       Diagnostic Studies  Results Reviewed     Procedure Component Value Units Date/Time    Urine Microscopic [[id number]]  (Abnormal) Collected:  02/09/19 1145    Lab Status:  Final result Specimen:  Urine from Urine, Clean Catch Updated:  02/09/19 1216     RBC, UA 4-10 (A) /hpf      WBC, UA 1-2 (A) /hpf      Epithelial Cells Occasional /hpf      Bacteria, UA Occasional /hpf     UA w Reflex to Microscopic [[id number]]  (Abnormal) Collected:  02/09/19 1145    Lab Status:  Final result Specimen:  Urine from Urine, Clean Catch Updated:  02/09/19 1158     Color, UA Yellow     Clarity, UA Clear     Specific [place], UA 1 020     pH, UA 6 5     Leukocytes, UA Negative     Nitrite, UA Negative     Protein, UA Negative mg/dl      Glucose, UA Negative mg/dl      Ketones, UA Trace (A) mg/dl      Urobilinogen, UA 0 2 E U /dl      Bilirubin, UA Negative     Blood, UA Large (A)    Basic metabolic panel [[id number]]  (Abnormal) Collected:  02/09/19 0950    Lab Status:  Final result Specimen:  Blood from Arm, Left Updated:  02/09/19 1014     Sodium 143 mmol/L      Potassium 3 4 (L) mmol/L      Chloride 103 mmol/L      CO2 28 mmol/L      ANION GAP 12 mmol/L      BUN 19 mg/dL      Creatinine 1 55 (H) mg/dL      Glucose 125 mg/dL      Calcium 9 0 mg/dL      eGFR 44 ml/min/1 73sq m     Narrative:         National Kidney Disease Education Program recommendations are as follows:  GFR calculation is accurate only with a steady state creatinine  Chronic Kidney disease less than 60 ml/min/1 73 sq  meters  Kidney failure less than 15 ml/min/1 73 sq  meters  CT renal stone study abdomen pelvis without contrast   Final Result by Nia Brown MD (02/09 1012)   1   5 mm obstructing calculus in the middle 3rd of the right ureter at the L4-5 level with mild to moderate right-sided hydroureteronephrosis  2   Bilateral nonobstructing renal calculi  3   Moderate size hiatal hernia  4   Colonic diverticulosis without evidence of acute diverticulitis  Workstation performed: [id number]                    Procedures  Procedures       Phone Contacts  ED Phone Contact    ED Course  ED Course as of Feb 09 1216   Sat Feb 09, 2019   1114 Patient states pain controlled  Updated regarding results    Will speak to patient's urologist                                MDM  Number of Diagnoses or Management Options  Diagnosis management comments: 72-year-old male complaining of right flank pain getting worse over the last 2 days  He states this is exactly like his previous kidney stones  Some of his stones have require basket extractions and lithotripsy  On exam he appears well in no distress  He does have right CVA tenderness on exam but no abdominal tenderness  Will order a urinalysis, chemistry to rule out acute renal failure  Will also give IV fluids an order CT stone study to assess presence of kidney stone   Highly doubt other etiology such as ruptured AAA or acute appendicitis given his exam and his history of similar pain with kidney stones in the past        Amount and/or Complexity of Data Reviewed  Clinical lab tests: ordered and reviewed  Tests in the radiology section of CPT®: ordered and reviewed  Review and summarize past medical records: yes  Independent visualization of images, tracings, or specimens: yes        Disposition  Final diagnoses:   Right ureteral stone     Time reflects when diagnosis was documented in both MDM as applicable and the Disposition within this note     Time User Action Codes Description Comment    2/9/2019 12:15 PM Eliazar MOJICA Add [N20 1] Right ureteral stone       ED Disposition     ED Disposition Condition Date/Time Comment    Discharge  Sat Feb 9, 2019 12:15 PM Lurena Alpers discharge to home/self care      Condition at discharge: Good        Follow-up Information     Follow up With Specialties Details Why [address] [facility] Schedule an appointment as soon as possible for a visit in 2 days  [place] [phone]  [phone]  [facility], [address], [facility], [place], [address]          Patient's Medications   Discharge Prescriptions    ONDANSETRON (ZOFRAN-ODT) 4 MG DISINTEGRATING TABLET    Take 1 tablet (4 mg total) by mouth every 8 (eight) hours as needed for nausea or vomiting Start Date: 2/9/2019  End Date: --       Order Dose: 4 mg       Quantity: 10 tablet    Refills: 0    OXYCODONE-ACETAMINOPHEN (PERCOCET) 5-325 MG PER TABLET    Take 1 tablet by mouth every 4 (four) hours as needed for moderate pain for up to 10 days Max Daily Amount: 6 tablets       Start Date: 2/9/2019  End Date: 2/19/2019       Order Dose: 1 tablet       Quantity: 15 tablet    Refills: 0     No discharge procedures on file      ED Provider  Electronically Signed by           Tonya Medina DO  02/09/19 2806

## 2019-02-09 NOTE — DISCHARGE INSTRUCTIONS
Ureteral Stones   WHAT YOU NEED TO KNOW:   A ureteral stone is a stone that forms in the kidney and moves down the ureter and gets stuck there  The ureter is the tube that takes urine from the kidney to the bladder  Stones can form in the urinary system when your urine has high levels of minerals and salts  Urinary stones can be made of uric acid, calcium, phosphate, or oxalate crystals  DISCHARGE INSTRUCTIONS:   Return to the emergency department if:   · You have severe pain that does not improve, even after you take medicine  · You have vomiting that is not relieved by medicine  · You develop a fever  Contact your healthcare provider if:   · You develop a fever  · You have any questions or concerns about your condition or care  Follow up with your healthcare provider as directed: You may need to return for more tests  Write down your questions so you remember to ask them during your visits  Medicines: You may need any of the following:  · NSAIDs , such as ibuprofen, help decrease swelling, pain, and fever  This medicine is available with or without a doctor's order  NSAIDs can cause stomach bleeding or kidney problems in certain people  If you take blood thinner medicine, always ask your healthcare provider if NSAIDs are safe for you  Always read the medicine label and follow directions  · Prescription pain medicine  may help decrease pain or help your ureteral stone pass  Do not wait until the pain is severe before you take pain medicine  · Nausea medicine  may help calm your stomach and prevent vomiting  · Take your medicine as directed  Contact your healthcare provider if you think your medicine is not helping or if you have side effects  Tell him or her if you are allergic to any medicine  Keep a list of the medicines, vitamins, and herbs you take  Include the amounts, and when and why you take them  Bring the list or the pill bottles to follow-up visits   Carry your medicine list with you in case of an emergency  Self-care:   · Drink plenty of liquids  Your healthcare provider may tell you to drink at least 8 to 12 (eight-ounce) cups of liquids each day  This helps flush out the ureteral stones when you urinate  Water is the best liquid to drink  · Strain your urine every time you go to the bathroom  Urinate through a strainer or a piece of thin cloth to catch the stones  Take the stones to your healthcare provider so they can be sent to the lab for tests  This will help your healthcare providers plan the best treatment for you  · Ask your healthcare provider about any nutrition changes you need to make  You may need to limit certain foods such as foods high in sodium (salt), certain protein foods, or foods high in oxalate  After you pass your ureteral stone: Once you have passed your ureteral stone, you may need to do a 24-hour urine test  You may need to save all of your urine for 24 hours  Each time you go to the bathroom, you will urinate into a container  Then you will pour your urine into a larger container that is kept cold  You may be told to write down the time and amount of urine you passed  At the end of 24 hours, the urine is sent to a lab for tests  Results from the test will help your healthcare provider plan ways to prevent more stones from forming  © 2017 [employer] Information is for End User's use only and may not be sold, redistributed or otherwise used for commercial purposes  All illustrations and images included in CareNotes® are the copyrighted property of A D [employer] M [employer] Inc  or Iain Desai  The above information is an  only  It is not intended as medical advice for individual conditions or treatments  Talk to your doctor, nurse or pharmacist before following any medical regimen to see if it is safe and effective for you

## 2019-02-12 ENCOUNTER — TELEPHONE (OUTPATIENT)
Dept: UROLOGY | Facility: CLINIC | Age: 74
End: 2019-02-12

## 2019-02-12 NOTE — TELEPHONE ENCOUNTER
Patient was recently in ER and was requesting an appt  He does have a 5 mm obstructing stone  [facility]

## 2019-02-13 ENCOUNTER — OFFICE VISIT (OUTPATIENT)
Dept: UROLOGY | Facility: CLINIC | Age: 74
End: 2019-02-13
Payer: COMMERCIAL

## 2019-02-13 VITALS
WEIGHT: 165 LBS | HEIGHT: 67 IN | BODY MASS INDEX: 25.9 KG/M2 | DIASTOLIC BLOOD PRESSURE: 76 MMHG | HEART RATE: 64 BPM | SYSTOLIC BLOOD PRESSURE: 120 MMHG

## 2019-02-13 DIAGNOSIS — N40.1 BENIGN PROSTATIC HYPERPLASIA WITH LOWER URINARY TRACT SYMPTOMS, SYMPTOM DETAILS UNSPECIFIED: ICD-10-CM

## 2019-02-13 DIAGNOSIS — N20.0 NEPHROLITHIASIS: Primary | ICD-10-CM

## 2019-02-13 PROCEDURE — 99213 OFFICE O/P EST LOW 20 MIN: CPT | Performed by: NURSE PRACTITIONER

## 2019-02-13 NOTE — PROGRESS NOTES
2/13/2019    [facility]  1945  [id number]      Assessment  -Nephrolithiasis  -History prostatitis   -BPH with lower urinary tract symptoms     Discussion/Plan  Cathi Cassidy is a 72 y o  male being managed by Dr Ceci Barrett        -Reviewed results of recent CT scan which identified 5 mm right mid ureteral calculus with mild to moderate hydroureteronephrosis  We discussed that given size and location of stone he has a chance of spontaneously passing  Patient was advised to continue taking Flomax daily for medical expulsive therapy  He was encouraged to increase water intake and strain all urine  Patient was instructed to continue using OTC NSAIDs for flank pain and call office if he requires additional prescription for narcotics  Reviewed ER precautions   -Follow up in 1-2 weeks with repeat KUB and renal US  Patient instructed to call with any issues  -All questions answered, patient agrees with plan      History of Present Illness  72 y o  male with a history of nephrolithiasis and prostatitis presents today for follow up  Patient recently presented to [facility] ER on 2/9/19 with right sided flank pain  He reports associated symptoms of nausea, no fever, chills, or vomiting  CT renal stone study identified 5mm right mid ureteral calculus with moderate hydronephrosis  He was also noted to have bilateral nonobstructing renal calculi  UA showed no evidence of infection, creatinine 1 55  Patient was discharged home with narcotics and zofran  He continues to take Flomax daily for BPH with LUTS  Patient reports ongoing right sided flank pain  He was required use of narcotics x3 for severe pain, but has primarily been taking tylenol and/or ibuprofen  He currently denies any lower urinary tract symptoms, gross hematuria, or dysuria  Patient has distant history of ureteroscopy and lithotripsy x2 performed by Dr Leann Mckeon and Dr Jovita Roberson  He states spontaneously passing 15 kidney stones          Review of Systems  Review of Systems   Constitutional: Negative  HENT: Negative  Respiratory: Negative  Cardiovascular: Negative  Gastrointestinal: Negative  Genitourinary: Positive for flank pain (right sided )  Negative for decreased urine volume, difficulty urinating, dysuria, frequency, hematuria and urgency  Skin: Negative  Neurological: Negative  Psychiatric/Behavioral: Negative          Past Medical History  Past Medical History:   Diagnosis Date    Age-related cataract of both eyes 6/30/2017    Arthritis     Benign colon polyp     /last assessed 6/17/2015    Bilateral inguinal hernia     Cellulitis of leg, left 6/30/2017    Chronic gout 6/30/2017    Essential hypertension 6/30/2017    Gastroesophageal reflux disease without esophagitis 6/30/2017    GERD (gastroesophageal reflux disease)     Gout     Herpes simplex type 1 infection     last assessed 12/18/2014    History of abdominal hernia 6/30/2017    History of pneumonia     History of prostatitis     History of total right hip arthroplasty 6/30/2017    History of total right hip replacement     Hyperlipidemia     Hypertension     Idiopathic peripheral neuropathy 6/30/2017    Kidney stone     Left inguinal hernia     Lyme disease     last assessed 7/10/2017    Nephrolithiasis 6/30/2017    Peripheral neuropathy     Podagra     last assessed 12/5/2014    Primary open angle glaucoma 6/30/2017    Prostatitis, chronic 6/30/2017    Rosacea 6/30/2017       Past Social History  Past Surgical History:   Procedure Laterality Date    ABDOMINAL SURGERY      CATARACT EXTRACTION      COLONOSCOPY      last assessed 6/17/2015    DUPUYTREN / PALMAR FASCIOTOMY      last assessed 6/17/2015    DUPUYTREN CONTRACTURE RELEASE Left     ESOPHAGOGASTRODUODENOSCOPY      HERNIA REPAIR Right     inguinal    HERNIA REPAIR Left 2/9/2017    Procedure: REPAIR HERNIA INGUINAL, LAPAROSCOPIC WITH MESH;  Surgeon: Ellen Kearns MD;  Location: [facility];  Service:    [place] Bilateral 2011    with mesh done at [facility]      hip replacement    LITHOTRIPSY      ND COLONOSCOPY FLX DX W/COLLJ SPEC WHEN PFRMD N/A 10/31/2018    Procedure: EGD AND COLONOSCOPY;  Surgeon: Rodo Martin MD;  Location: [facility]; Service: Gastroenterology    TONSILLECTOMY      TOTAL HIP ARTHROPLASTY Right     last assessed 12/5/2014       Past Family History  Family History   Problem Relation Age of Onset    Coronary artery disease Mother     Cancer Father        Past Social history  Social History     Socioeconomic History    Marital status:      Spouse name: Not on file    Number of children: Not on file    Years of education: Not on file    Highest education level: Not on file   Occupational History    Occupation: retired   Social Needs    Financial resource strain: Not on file    Food insecurity:     Worry: Not on file     Inability: Not on file   [employer] needs:     Medical: Not on file     Non-medical: Not on file   Tobacco Use    Smoking status: Never Smoker    Smokeless tobacco: Never Used    Tobacco comment: per allscripts ' former smoker / never a smoker '   Substance and Sexual Activity    Alcohol use:  Yes     Alcohol/week: 0 6 oz     Types: 1 Glasses of wine per week     Comment: daily with dinner/occas beer    Drug use: No    Sexual activity: Not on file   Lifestyle    Physical activity:     Days per week: Not on file     Minutes per session: Not on file    Stress: Not on file   Relationships    Social connections:     Talks on phone: Not on file     Gets together: Not on file     Attends [employer] service: Not on file     Active member of club or organization: Not on file     Attends meetings of clubs or organizations: Not on file     Relationship status: Not on file    Intimate partner violence:     Fear of current or ex partner: Not on file     Emotionally abused: Not on file     Physically abused: Not on file     Forced sexual activity: Not on file   Other Topics Concern    Not on file   Social History Narrative    Not on file       Current Medications  Current Outpatient Medications   Medication Sig Dispense Refill    acetaminophen (TYLENOL) 325 mg tablet Take 650 mg by mouth every 6 (six) hours as needed for mild pain      allopurinol (ZYLOPRIM) 300 mg tablet Take 1 tablet (300 mg total) by mouth daily 90 tablet 3    ALPHA LIPOIC ACID PO Take 1 tablet by mouth daily      amLODIPine (NORVASC) 5 mg tablet Take 1 tablet (5 mg total) by mouth daily 30 tablet 5    amoxicillin (AMOXIL) 500 mg capsule Take 500 mg by mouth once as needed 4 caps before dental work      atorvastatin (LIPITOR) 10 mg tablet Take 1 tablet (10 mg total) by mouth daily 90 tablet 0    ferrous gluconate (FERGON) 324 mg tablet Take 324 mg by mouth daily with breakfast      gabapentin (NEURONTIN) 300 mg capsule Take 300 mg by mouth daily at bedtime      influenza vaccine, high-dose (FLUZONE HIGH-DOSE) 0 5 ML RAHEEM TO BE ADMINISTERED BY PHARMACIST FOR IMMUNIZATION      losartan-hydrochlorothiazide (HYZAAR) 100-25 MG per tablet Take 1 tablet by mouth daily      metroNIDAZOLE (METROGEL) 1 % gel Apply at bedtime to affected skin 60 g 0    Multiple Vitamin (MULTIVITAMIN) capsule Take 1 capsule by mouth daily      naproxen (NAPROSYN) 250 mg tablet Take 250 mg by mouth 2 (two) times a day as needed for mild pain      omeprazole (PriLOSEC) 20 mg delayed release capsule Take 20 mg by mouth daily      ondansetron (ZOFRAN-ODT) 4 mg disintegrating tablet Take 1 tablet (4 mg total) by mouth every 8 (eight) hours as needed for nausea or vomiting 10 tablet 0    oxyCODONE-acetaminophen (PERCOCET) 5-325 mg per tablet Take 1 tablet by mouth every 4 (four) hours as needed for moderate pain for up to 10 days Max Daily Amount: 6 tablets 15 tablet 0    oxymetazoline (AFRIN) 0 05 % nasal spray 2 sprays by Each Nare route 2 (two) times a day      ranitidine (ZANTAC) 150 MG capsule Take 150 mg by mouth daily at bedtime as needed for indigestion or heartburn      Saw Palmetto, Serenoa repens, (SAW PALMETTO PO) Take 1 capsule by mouth 3 (three) times a day        tamsulosin (FLOMAX) 0 4 mg Take 1 capsule (0 4 mg total) by mouth daily at bedtime 90 capsule 3    timolol (TIMOPTIC) 0 5 % ophthalmic solution INSTILL 1 DROP INTO RIGHT EYE IN THE MORNING  3    travoprost (TRAVATAN Z) 0 004 % ophthalmic solution Administer 1 drop to the right eye daily at bedtime       No current facility-administered medications for this visit  Allergies  No Known Allergies    Past Medical History, Social History, Family History, medications and allergies were reviewed  Vitals  There were no vitals filed for this visit  Physical Exam  Physical Exam   Constitutional: He is oriented to person, place, and time  He appears well-developed and well-nourished  HENT:   Head: Normocephalic  Eyes: Pupils are equal, round, and reactive to light  Neck: Normal range of motion  Cardiovascular: Normal rate and regular rhythm  Pulmonary/Chest: Effort normal    Abdominal: Soft  Normal appearance  He exhibits no distension  There is no tenderness  There is no CVA tenderness  Genitourinary:   Genitourinary Comments: Negative CVA tenderness    Musculoskeletal: Normal range of motion  Neurological: He is alert and oriented to person, place, and time  Skin: Skin is warm and dry  Psychiatric: He has a normal mood and affect   His behavior is normal  Judgment and thought content normal        Results    I have personally reviewed all pertinent lab results and reviewed with patient  Lab Results   Component Value Date    PSA 1 5 01/07/2019    PSA 2 0 10/26/2017    PSA 1 4 03/08/2016     Lab Results   Component Value Date    CALCIUM 9 0 02/09/2019    K 3 4 (L) 02/09/2019    CO2 28 02/09/2019     02/09/2019    BUN 19 02/09/2019 CREATININE 1 55 (H) 02/09/2019     Lab Results   Component Value Date    WBC 4 85 11/15/2018    HGB 12 7 11/15/2018    HCT 43 4 11/15/2018    MCV 80 (L) 11/15/2018     11/15/2018     No results found for this or any previous visit (from the past 1 hour(s))

## 2019-02-22 ENCOUNTER — OFFICE VISIT (OUTPATIENT)
Dept: INTERNAL MEDICINE CLINIC | Facility: CLINIC | Age: 74
End: 2019-02-22
Payer: COMMERCIAL

## 2019-02-22 VITALS
HEIGHT: 67 IN | TEMPERATURE: 98.6 F | HEART RATE: 61 BPM | DIASTOLIC BLOOD PRESSURE: 61 MMHG | BODY MASS INDEX: 25.8 KG/M2 | SYSTOLIC BLOOD PRESSURE: 131 MMHG | OXYGEN SATURATION: 95 % | WEIGHT: 164.4 LBS

## 2019-02-22 DIAGNOSIS — N20.0 NEPHROLITHIASIS: Chronic | ICD-10-CM

## 2019-02-22 DIAGNOSIS — Z00.00 MEDICARE ANNUAL WELLNESS VISIT, SUBSEQUENT: ICD-10-CM

## 2019-02-22 DIAGNOSIS — L71.9 ROSACEA: Chronic | ICD-10-CM

## 2019-02-22 DIAGNOSIS — N20.0 RENAL CALCULI: Primary | ICD-10-CM

## 2019-02-22 DIAGNOSIS — Z00.00 WELL ADULT EXAM: Primary | ICD-10-CM

## 2019-02-22 DIAGNOSIS — I10 ESSENTIAL HYPERTENSION: Chronic | ICD-10-CM

## 2019-02-22 DIAGNOSIS — N41.1 PROSTATITIS, CHRONIC: Chronic | ICD-10-CM

## 2019-02-22 PROCEDURE — 1125F AMNT PAIN NOTED PAIN PRSNT: CPT | Performed by: INTERNAL MEDICINE

## 2019-02-22 PROCEDURE — 1036F TOBACCO NON-USER: CPT | Performed by: INTERNAL MEDICINE

## 2019-02-22 PROCEDURE — 1170F FXNL STATUS ASSESSED: CPT | Performed by: INTERNAL MEDICINE

## 2019-02-22 PROCEDURE — 3075F SYST BP GE 130 - 139MM HG: CPT | Performed by: INTERNAL MEDICINE

## 2019-02-22 PROCEDURE — 82360 CALCULUS ASSAY QUANT: CPT | Performed by: PHYSICIAN ASSISTANT

## 2019-02-22 PROCEDURE — 3078F DIAST BP <80 MM HG: CPT | Performed by: INTERNAL MEDICINE

## 2019-02-22 PROCEDURE — G0439 PPPS, SUBSEQ VISIT: HCPCS | Performed by: INTERNAL MEDICINE

## 2019-02-22 PROCEDURE — 99214 OFFICE O/P EST MOD 30 MIN: CPT | Performed by: INTERNAL MEDICINE

## 2019-02-22 RX ORDER — CIPROFLOXACIN 500 MG/1
500 TABLET, FILM COATED ORAL EVERY 12 HOURS SCHEDULED
Qty: 20 TABLET | Refills: 3 | Status: SHIPPED | OUTPATIENT
Start: 2019-02-22 | End: 2019-03-04

## 2019-02-22 RX ORDER — DOXYCYCLINE 50 MG/1
TABLET ORAL
Qty: 30 TABLET | Refills: 2 | Status: SHIPPED | OUTPATIENT
Start: 2019-02-22 | End: 2019-05-20 | Stop reason: SDUPTHER

## 2019-02-22 NOTE — PROGRESS NOTES
Assessment/Plan:     Diagnoses and all orders for this visit:    Well adult exam    Medicare annual wellness visit, subsequent    Essential hypertension    Nephrolithiasis    Rosacea  -     doxycycline (ADOXA) 50 MG tablet; 50 mg once a day at bedtime    Prostatitis, chronic  -     ciprofloxacin (CIPRO) 500 mg tablet; Take 1 tablet (500 mg total) by mouth every 12 (twelve) hours for 10 days          Subjective:      Patient ID: Kenan Acosta is a 72 y o  male here in follow-up today regarding several issues  HPI   Hypertension is better controlled without side effects since the addition of amlodipine  Plans continue this medicine plus losartan HCT  He is still taking iron intermittently and we discussed stopping iron at the end of the month as he has no active bleeding  He is going to donate platelets and plasma but I asked him not to donate blood for the time being  We reviewed the recent numerous notes from Urology as he has passed several kidney stones, and currently is minimally symptomatic and recently passed 3 more stones  Was already taking Flomax and will continue this as well as good hydration and follow up with Urology as needed  He is going to submit his stones for analysis and previous stones were calcium oxalate  Rosacea is no longer responding to metronidazole and he asked about alternatives  I suggested a three-month course of low-dose doxycycline explaining potential side effects and precautions and benefits, and then to switch back at the end of that interval to metronidazole cream   I estimate call as needed during the transitional therapy  Percy Maharaj has a history of intermittent flares of chronic prostatitis and has been authorized to take Cipro by his urologist as needed and his prescription is old and needs renewal this was renewed today  We discussed follow-up which can be in 6 months at this time      The following portions of the patient's history were reviewed and updated as appropriate: allergies, current medications, past family history, past medical history, past social history, past surgical history and problem list     Review of Systems      Objective:      /61 (BP Location: Left arm, Patient Position: Sitting, Cuff Size: Standard)   Pulse 61   Temp 98 6 °F (37 °C) (Tympanic)   Ht 5' 7" (1 702 m)   Wt 74 6 kg (164 lb 6 4 oz)   SpO2 95%   BMI 25 75 kg/m²      Wt Readings from Last 3 Encounters:   02/22/19 74 6 kg (164 lb 6 4 oz)   02/13/19 74 8 kg (165 lb)   02/09/19 68 7 kg (151 lb 7 3 oz)     Temp Readings from Last 3 Encounters:   02/22/19 98 6 °F (37 °C) (Tympanic)   02/09/19 98 1 °F (36 7 °C) (Oral)   12/03/18 (!) 97 3 °F (36 3 °C) (Tympanic)     BP Readings from Last 3 Encounters:   02/22/19 131/61   02/13/19 120/76   02/09/19 162/80     Pulse Readings from Last 3 Encounters:   02/22/19 61   02/13/19 64   02/09/19 61        Physical Exam    Vital signs stable, very pleasant gentleman who appears in no distress  Pulse regular, lungs clear and cardiac exam is normal   There is no peripheral edema  Skin without pallor or icterus    Patient Active Problem List   Diagnosis    Essential hypertension    Prostatitis, chronic    History of total right hip arthroplasty    Hereditary and idiopathic peripheral neuropathy    Nephrolithiasis    Primary open angle glaucoma    History of abdominal hernia    Chronic gout    Gastroesophageal reflux disease without esophagitis    Rosacea    Diverticulosis    Glaucoma    Hyperlipidemia    Dyslipidemia    Osteoarthritis of spine with radiculopathy, lumbar region    Iron deficiency anemia    Primary osteoarthritis involving multiple joints       Current Outpatient Medications on File Prior to Visit   Medication Sig Dispense Refill    allopurinol (ZYLOPRIM) 300 mg tablet Take 1 tablet (300 mg total) by mouth daily 90 tablet 3    ALPHA LIPOIC ACID PO Take 1 tablet by mouth daily      amLODIPine (NORVASC) 5 mg tablet Take 1 tablet (5 mg total) by mouth daily 30 tablet 5    atorvastatin (LIPITOR) 10 mg tablet Take 1 tablet (10 mg total) by mouth daily 90 tablet 0    ferrous gluconate (FERGON) 324 mg tablet Take 324 mg by mouth daily with breakfast      gabapentin (NEURONTIN) 300 mg capsule Take 300 mg by mouth daily at bedtime      losartan-hydrochlorothiazide (HYZAAR) 100-25 MG per tablet Take 1 tablet by mouth daily      Multiple Vitamin (MULTIVITAMIN) capsule Take 1 capsule by mouth daily      naproxen (NAPROSYN) 250 mg tablet Take 250 mg by mouth 2 (two) times a day as needed for mild pain      omeprazole (PriLOSEC) 20 mg delayed release capsule Take 20 mg by mouth daily      ondansetron (ZOFRAN-ODT) 4 mg disintegrating tablet Take 1 tablet (4 mg total) by mouth every 8 (eight) hours as needed for nausea or vomiting 10 tablet 0    oxymetazoline (AFRIN) 0 05 % nasal spray 2 sprays by Each Nare route 2 (two) times a day      ranitidine (ZANTAC) 150 MG capsule Take 150 mg by mouth daily at bedtime as needed for indigestion or heartburn      Saw Palmetto, Serenoa repens, (SAW PALMETTO PO) Take 1 capsule by mouth 3 (three) times a day        tamsulosin (FLOMAX) 0 4 mg Take 1 capsule (0 4 mg total) by mouth daily at bedtime 90 capsule 3    timolol (TIMOPTIC) 0 5 % ophthalmic solution INSTILL 1 DROP INTO RIGHT EYE IN THE MORNING  3    travoprost (TRAVATAN Z) 0 004 % ophthalmic solution Administer 1 drop to the right eye daily at bedtime      [DISCONTINUED] metroNIDAZOLE (METROGEL) 1 % gel Apply at bedtime to affected skin 60 g 0    acetaminophen (TYLENOL) 325 mg tablet Take 650 mg by mouth every 6 (six) hours as needed for mild pain      amoxicillin (AMOXIL) 500 mg capsule Take 500 mg by mouth once as needed 4 caps before dental work      influenza vaccine, high-dose (FLUZONE HIGH-DOSE) 0 5 ML RAHEEM TO BE ADMINISTERED BY PHARMACIST FOR IMMUNIZATION       No current facility-administered medications on file prior to visit

## 2019-02-22 NOTE — PROGRESS NOTES
Assessment and Plan:    Problem List Items Addressed This Visit     None      Visit Diagnoses     Medicare annual wellness visit, subsequent    -  Primary        Health Maintenance Due   Topic Date Due    Hepatitis C Screening  1945    Medicare Annual Wellness Visit (AWV)  1945    BMI: Followup Plan  06/19/1965    DTaP,Tdap,and Td Vaccines (1 - Tdap) 06/19/1968    Pneumococcal PPSV23/PCV13 65+ Years / Low and Medium Risk (2 of 2 - PPSV23) 06/18/2016         HPI:  Wale Ty is a 72 y o  male here for his Subsequent Wellness Visit      Patient Active Problem List   Diagnosis    Essential hypertension    Prostatitis, chronic    History of total right hip arthroplasty    Hereditary and idiopathic peripheral neuropathy    Nephrolithiasis    Primary open angle glaucoma    History of abdominal hernia    Chronic gout    Gastroesophageal reflux disease without esophagitis    Rosacea    Diverticulosis    Glaucoma    Hyperlipidemia    Dyslipidemia    Osteoarthritis of spine with radiculopathy, lumbar region    Iron deficiency anemia    Primary osteoarthritis involving multiple joints     Past Medical History:   Diagnosis Date    Age-related cataract of both eyes 6/30/2017    Arthritis     Benign colon polyp     /last assessed 6/17/2015    Bilateral inguinal hernia     Cellulitis of leg, left 6/30/2017    Chronic gout 6/30/2017    Essential hypertension 6/30/2017    Gastroesophageal reflux disease without esophagitis 6/30/2017    GERD (gastroesophageal reflux disease)     Gout     Herpes simplex type 1 infection     last assessed 12/18/2014    History of abdominal hernia 6/30/2017    History of pneumonia     History of prostatitis     History of total right hip arthroplasty 6/30/2017    History of total right hip replacement     Hyperlipidemia     Hypertension     Idiopathic peripheral neuropathy 6/30/2017    Kidney stone     Left inguinal hernia     Lyme disease last assessed 7/10/2017    Nephrolithiasis 6/30/2017    Peripheral neuropathy     Podagra     last assessed 12/5/2014    Primary open angle glaucoma 6/30/2017    Prostatitis, chronic 6/30/2017    Rosacea 6/30/2017     Past Surgical History:   Procedure Laterality Date    ABDOMINAL SURGERY      CATARACT EXTRACTION      COLONOSCOPY      last assessed 6/17/2015    DUPUYTREN / PALMAR FASCIOTOMY      last assessed 6/17/2015    DUPUYTREN CONTRACTURE RELEASE Left     ESOPHAGOGASTRODUODENOSCOPY      HERNIA REPAIR Right     inguinal    HERNIA REPAIR Left 2/9/2017    Procedure: REPAIR HERNIA INGUINAL, LAPAROSCOPIC WITH MESH;  Surgeon: Jake Burgos MD;  Location: [facility] OR;  Service:    [facility] Bilateral 2011    with mesh done at [address] [facility]      hip replacement    LITHOTRIPSY      MO COLONOSCOPY FLX DX W/JUSTIN Kamara 1978 PFRMD N/A 10/31/2018    Procedure: EGD AND COLONOSCOPY;  Surgeon: King Powell MD;  Location: [facility] GI LAB;   Service: Gastroenterology    TONSILLECTOMY      TOTAL HIP ARTHROPLASTY Right     last assessed 12/5/2014     Family History   Problem Relation Age of Onset    Coronary artery disease Mother     Cancer Father      Social History     Tobacco Use   Smoking Status Never Smoker   Smokeless Tobacco Never Used   Tobacco Comment    per allscripts ' former smoker / never a smoker '     Social History     Substance and Sexual Activity   Alcohol Use Yes    Alcohol/week: 0 6 oz    Types: 1 Glasses of wine per week    Comment: daily with dinner/occas beer      Social History     Substance and Sexual Activity   Drug Use No       Current Outpatient Medications   Medication Sig Dispense Refill    allopurinol (ZYLOPRIM) 300 mg tablet Take 1 tablet (300 mg total) by mouth daily 90 tablet 3    ALPHA LIPOIC ACID PO Take 1 tablet by mouth daily      amLODIPine (NORVASC) 5 mg tablet Take 1 tablet (5 mg total) by mouth daily 30 tablet 5    atorvastatin (LIPITOR) 10 mg tablet Take 1 tablet (10 mg total) by mouth daily 90 tablet 0    ferrous gluconate (FERGON) 324 mg tablet Take 324 mg by mouth daily with breakfast      gabapentin (NEURONTIN) 300 mg capsule Take 300 mg by mouth daily at bedtime      losartan-hydrochlorothiazide (HYZAAR) 100-25 MG per tablet Take 1 tablet by mouth daily      metroNIDAZOLE (METROGEL) 1 % gel Apply at bedtime to affected skin 60 g 0    Multiple Vitamin (MULTIVITAMIN) capsule Take 1 capsule by mouth daily      naproxen (NAPROSYN) 250 mg tablet Take 250 mg by mouth 2 (two) times a day as needed for mild pain      omeprazole (PriLOSEC) 20 mg delayed release capsule Take 20 mg by mouth daily      ondansetron (ZOFRAN-ODT) 4 mg disintegrating tablet Take 1 tablet (4 mg total) by mouth every 8 (eight) hours as needed for nausea or vomiting 10 tablet 0    oxymetazoline (AFRIN) 0 05 % nasal spray 2 sprays by Each Nare route 2 (two) times a day      ranitidine (ZANTAC) 150 MG capsule Take 150 mg by mouth daily at bedtime as needed for indigestion or heartburn      Saw Palmetto, Serenoa repens, (SAW PALMETTO PO) Take 1 capsule by mouth 3 (three) times a day        tamsulosin (FLOMAX) 0 4 mg Take 1 capsule (0 4 mg total) by mouth daily at bedtime 90 capsule 3    timolol (TIMOPTIC) 0 5 % ophthalmic solution INSTILL 1 DROP INTO RIGHT EYE IN THE MORNING  3    travoprost (TRAVATAN Z) 0 004 % ophthalmic solution Administer 1 drop to the right eye daily at bedtime      acetaminophen (TYLENOL) 325 mg tablet Take 650 mg by mouth every 6 (six) hours as needed for mild pain      amoxicillin (AMOXIL) 500 mg capsule Take 500 mg by mouth once as needed 4 caps before dental work      influenza vaccine, high-dose (FLUZONE HIGH-DOSE) 0 5 ML RAHEEM TO BE ADMINISTERED BY PHARMACIST FOR IMMUNIZATION       No current facility-administered medications for this visit        No Known Allergies  Immunization History   Administered Date(s) Administered    H1N1, All Formulations 02/02/2010    INFLUENZA 11/06/2003, 11/01/2006, 01/03/2013, 02/04/2015, 09/10/2018    Influenza Split High Dose Preservative Free IM 10/01/2016, 09/10/2018    Influenza TIV (IM) 10/28/2015    Pneumococcal Conjugate 13-Valent 06/18/2015       Patient Care Team:  Surya Boudreaux MD as PCP - General  Ever Linn MD    Medicare Screening Tests and Risk Assessments:      Health Risk Assessment:  Patient rates overall health as very good  Patient feels that their physical health rating is Same  Hearing was rated as Same  Patient feels that their emotional and mental health rating is Same  Pain experienced by patient in the last 7 days has been None  (Additional comments: Eyesight is much better due to cataract surgery )    Broken Bones/Falls: Fall Risk Assessment:    In the past year, patient has experienced: No history of falling in past year          Bladder/Bowel:  Patient has not leaked urine accidently in the last six months  Patient reports no loss of bowel control  Immunizations:  Patient has had a flu vaccination within the last year  Patient has received a pneumonia shot  Patient has received a shingles shot  Home Safety:  Patient does not have trouble with stairs inside or outside of their home  Patient currently reports that there are no safety hazards present in home, working smoke alarms, working carbon monoxide detectors  Preventative Screenings:   prostate cancer screen performed, colon cancer screen completed, cholesterol screen completed, glaucoma eye exam completed,     Nutrition:  Current diet: Regular, No Added Salt and Limited junk food with servings of the following:    Medications:  Patient is not currently taking any over-the-counter supplements  Patient is able to manage medications  Lifestyle Choices:  Patient reports no tobacco use    Patient has not smoked or used tobacco in the past   Patient reports alcohol use         Alcohol use per week: 5-6  Patient drives a vehicle  Current level of exercise of physical activity described by patient as: moderate- 30 minute walks, 4 X a week           Activities of Daily Living:  Can get out of bed by his or her self, able to dress self, able to make own meals, able to do own shopping, able to bathe self, can do own laundry/housekeeping, can manage own money, pay bills and track expenses    Previous Hospitalizations:  Hospitalization or ED visit in past 12 months  Number of hospitalizations within the last year: 1-2  Additional Comments: Seen for a Hernia  Advanced Directives:  Patient has decided on a power of   Patient has spoken to designated power of   Patient has completed advanced directive  Preventative Screening/Counseling:      Cardiovascular:      General: Screening Current          Diabetes:      General: Screening Current          Colorectal Cancer:      General: Screening Current          Prostate Cancer:      General: Screening Current          Osteoporosis:      General: Screening Not Indicated          AAA:      General: Screening Not Indicated          Glaucoma:      General: Screening Current          HIV:      General: Screening Not Indicated          Hepatitis C:      General: Screening Not Indicated        Advanced Directives:   Patient has living will for healthcare, does not have durable POA for healthcare, patient has an advanced directive  Information on ACP and/or AD provided  5 wishes given  End of life assessment reviewed with patient  Provider agrees with end of life decisions

## 2019-02-22 NOTE — PROGRESS NOTES
[facility], to be sent for stone analysis

## 2019-03-01 LAB
CA PHOS MFR STONE: 5 %
COLOR STONE: NORMAL
COM MFR STONE: 95 %
COMMENT-STONE3: NORMAL
COMPOSITION: NORMAL
LABORATORY COMMENT REPORT: NORMAL
NIDUS STONE QL: NORMAL
PHOTO: NORMAL
SIZE STONE: NORMAL MM
STONE ANALYSIS-IMP: NORMAL
WT STONE: 139.9 MG

## 2019-03-11 DIAGNOSIS — I10 ESSENTIAL HYPERTENSION: Chronic | ICD-10-CM

## 2019-03-11 RX ORDER — AMLODIPINE BESYLATE 5 MG/1
TABLET ORAL
Qty: 30 TABLET | Refills: 4 | Status: SHIPPED | OUTPATIENT
Start: 2019-03-11 | End: 2019-06-29 | Stop reason: SDUPTHER

## 2019-04-03 DIAGNOSIS — I10 ESSENTIAL HYPERTENSION: Primary | ICD-10-CM

## 2019-04-03 RX ORDER — LOSARTAN POTASSIUM AND HYDROCHLOROTHIAZIDE 25; 100 MG/1; MG/1
TABLET ORAL
Qty: 90 TABLET | Refills: 3 | Status: SHIPPED | OUTPATIENT
Start: 2019-04-03 | End: 2019-04-29 | Stop reason: ALTCHOICE

## 2019-04-29 ENCOUNTER — TELEPHONE (OUTPATIENT)
Dept: INTERNAL MEDICINE CLINIC | Facility: CLINIC | Age: 74
End: 2019-04-29

## 2019-04-29 DIAGNOSIS — I10 ESSENTIAL HYPERTENSION: Primary | Chronic | ICD-10-CM

## 2019-04-29 RX ORDER — LISINOPRIL AND HYDROCHLOROTHIAZIDE 25; 20 MG/1; MG/1
1 TABLET ORAL DAILY
Qty: 90 TABLET | Refills: 3 | Status: SHIPPED | OUTPATIENT
Start: 2019-04-29 | End: 2019-04-30 | Stop reason: ALTCHOICE

## 2019-04-30 DIAGNOSIS — I10 ESSENTIAL HYPERTENSION: Primary | Chronic | ICD-10-CM

## 2019-04-30 RX ORDER — HYDROCHLOROTHIAZIDE 25 MG/1
25 TABLET ORAL DAILY
Qty: 90 TABLET | Refills: 5 | Status: SHIPPED | OUTPATIENT
Start: 2019-04-30 | End: 2019-10-24 | Stop reason: SDUPTHER

## 2019-04-30 RX ORDER — LOSARTAN POTASSIUM 50 MG/1
50 TABLET ORAL DAILY
Qty: 90 TABLET | Refills: 3 | Status: SHIPPED | OUTPATIENT
Start: 2019-04-30 | End: 2019-10-24

## 2019-05-06 DIAGNOSIS — E78.5 DYSLIPIDEMIA: ICD-10-CM

## 2019-05-06 RX ORDER — ATORVASTATIN CALCIUM 10 MG/1
TABLET, FILM COATED ORAL
Qty: 90 TABLET | Refills: 0 | Status: SHIPPED | OUTPATIENT
Start: 2019-05-06 | End: 2019-08-05 | Stop reason: SDUPTHER

## 2019-05-20 DIAGNOSIS — L71.9 ROSACEA: Chronic | ICD-10-CM

## 2019-05-20 RX ORDER — DOXYCYCLINE 50 MG/1
TABLET ORAL
Qty: 30 TABLET | Refills: 2 | Status: SHIPPED | OUTPATIENT
Start: 2019-05-20 | End: 2019-08-20

## 2019-06-29 DIAGNOSIS — I10 ESSENTIAL HYPERTENSION: Chronic | ICD-10-CM

## 2019-06-30 RX ORDER — AMLODIPINE BESYLATE 5 MG/1
TABLET ORAL
Qty: 90 TABLET | Refills: 3 | Status: SHIPPED | OUTPATIENT
Start: 2019-06-30 | End: 2019-09-13 | Stop reason: DRUGHIGH

## 2019-08-05 DIAGNOSIS — E78.5 DYSLIPIDEMIA: ICD-10-CM

## 2019-08-05 RX ORDER — ATORVASTATIN CALCIUM 10 MG/1
TABLET, FILM COATED ORAL
Qty: 90 TABLET | Refills: 0 | Status: SHIPPED | OUTPATIENT
Start: 2019-08-05 | End: 2019-11-06 | Stop reason: SDUPTHER

## 2019-09-01 DIAGNOSIS — L71.9 ROSACEA: Primary | ICD-10-CM

## 2019-09-02 RX ORDER — METRONIDAZOLE 10 MG/G
GEL TOPICAL
Qty: 60 G | Refills: 2 | Status: SHIPPED | OUTPATIENT
Start: 2019-09-02 | End: 2020-06-04 | Stop reason: SDUPTHER

## 2019-09-13 ENCOUNTER — APPOINTMENT (OUTPATIENT)
Dept: LAB | Facility: HOSPITAL | Age: 74
End: 2019-09-13
Payer: COMMERCIAL

## 2019-09-13 ENCOUNTER — OFFICE VISIT (OUTPATIENT)
Dept: INTERNAL MEDICINE CLINIC | Facility: CLINIC | Age: 74
End: 2019-09-13
Payer: COMMERCIAL

## 2019-09-13 VITALS
BODY MASS INDEX: 26.09 KG/M2 | SYSTOLIC BLOOD PRESSURE: 148 MMHG | HEIGHT: 67 IN | OXYGEN SATURATION: 95 % | HEART RATE: 65 BPM | DIASTOLIC BLOOD PRESSURE: 88 MMHG | WEIGHT: 166.2 LBS

## 2019-09-13 DIAGNOSIS — N20.0 NEPHROLITHIASIS: Chronic | ICD-10-CM

## 2019-09-13 DIAGNOSIS — E78.5 DYSLIPIDEMIA: ICD-10-CM

## 2019-09-13 DIAGNOSIS — M1A.9XX0 CHRONIC GOUT WITHOUT TOPHUS, UNSPECIFIED CAUSE, UNSPECIFIED SITE: Chronic | ICD-10-CM

## 2019-09-13 DIAGNOSIS — M15.9 PRIMARY OSTEOARTHRITIS INVOLVING MULTIPLE JOINTS: ICD-10-CM

## 2019-09-13 DIAGNOSIS — I10 ESSENTIAL HYPERTENSION: Primary | Chronic | ICD-10-CM

## 2019-09-13 LAB
ALBUMIN SERPL BCP-MCNC: 3.7 G/DL (ref 3.5–5)
ALP SERPL-CCNC: 73 U/L (ref 46–116)
ALT SERPL W P-5'-P-CCNC: 26 U/L (ref 12–78)
ANION GAP SERPL CALCULATED.3IONS-SCNC: 8 MMOL/L (ref 4–13)
AST SERPL W P-5'-P-CCNC: 22 U/L (ref 5–45)
BACTERIA UR QL AUTO: ABNORMAL /HPF
BASOPHILS # BLD AUTO: 0.03 THOUSANDS/ΜL (ref 0–0.1)
BASOPHILS NFR BLD AUTO: 1 % (ref 0–1)
BILIRUB SERPL-MCNC: 0.47 MG/DL (ref 0.2–1)
BILIRUB UR QL STRIP: NEGATIVE
BUN SERPL-MCNC: 23 MG/DL (ref 5–25)
CALCIUM SERPL-MCNC: 9.3 MG/DL (ref 8.3–10.1)
CHLORIDE SERPL-SCNC: 104 MMOL/L (ref 100–108)
CHOLEST SERPL-MCNC: 119 MG/DL (ref 50–200)
CLARITY UR: CLEAR
CO2 SERPL-SCNC: 30 MMOL/L (ref 21–32)
COLOR UR: YELLOW
CREAT SERPL-MCNC: 0.93 MG/DL (ref 0.6–1.3)
EOSINOPHIL # BLD AUTO: 0.13 THOUSAND/ΜL (ref 0–0.61)
EOSINOPHIL NFR BLD AUTO: 3 % (ref 0–6)
ERYTHROCYTE [DISTWIDTH] IN BLOOD BY AUTOMATED COUNT: 15 % (ref 11.6–15.1)
FERRITIN SERPL-MCNC: 27 NG/ML (ref 8–388)
GFR SERPL CREATININE-BSD FRML MDRD: 82 ML/MIN/1.73SQ M
GLUCOSE SERPL-MCNC: 117 MG/DL (ref 65–140)
GLUCOSE UR STRIP-MCNC: NEGATIVE MG/DL
HCT VFR BLD AUTO: 49.3 % (ref 36.5–49.3)
HDLC SERPL-MCNC: 29 MG/DL (ref 40–60)
HGB BLD-MCNC: 15.6 G/DL (ref 12–17)
HGB UR QL STRIP.AUTO: NEGATIVE
IMM GRANULOCYTES # BLD AUTO: 0.01 THOUSAND/UL (ref 0–0.2)
IMM GRANULOCYTES NFR BLD AUTO: 0 % (ref 0–2)
IRON SERPL-MCNC: 122 UG/DL (ref 65–175)
KETONES UR STRIP-MCNC: NEGATIVE MG/DL
LDLC SERPL CALC-MCNC: 70 MG/DL (ref 0–100)
LEUKOCYTE ESTERASE UR QL STRIP: NEGATIVE
LYMPHOCYTES # BLD AUTO: 0.93 THOUSANDS/ΜL (ref 0.6–4.47)
LYMPHOCYTES NFR BLD AUTO: 22 % (ref 14–44)
MCH RBC QN AUTO: 28.7 PG (ref 26.8–34.3)
MCHC RBC AUTO-ENTMCNC: 31.6 G/DL (ref 31.4–37.4)
MCV RBC AUTO: 91 FL (ref 82–98)
MONOCYTES # BLD AUTO: 0.55 THOUSAND/ΜL (ref 0.17–1.22)
MONOCYTES NFR BLD AUTO: 13 % (ref 4–12)
NEUTROPHILS # BLD AUTO: 2.61 THOUSANDS/ΜL (ref 1.85–7.62)
NEUTS SEG NFR BLD AUTO: 61 % (ref 43–75)
NITRITE UR QL STRIP: NEGATIVE
NON-SQ EPI CELLS URNS QL MICRO: ABNORMAL /HPF
NONHDLC SERPL-MCNC: 90 MG/DL
NRBC BLD AUTO-RTO: 0 /100 WBCS
PH UR STRIP.AUTO: 6.5 [PH]
PLATELET # BLD AUTO: 177 THOUSANDS/UL (ref 149–390)
PMV BLD AUTO: 11 FL (ref 8.9–12.7)
POTASSIUM SERPL-SCNC: 3.6 MMOL/L (ref 3.5–5.3)
PROT SERPL-MCNC: 7.3 G/DL (ref 6.4–8.2)
PROT UR STRIP-MCNC: NEGATIVE MG/DL
RBC # BLD AUTO: 5.43 MILLION/UL (ref 3.88–5.62)
RBC #/AREA URNS AUTO: ABNORMAL /HPF
SODIUM SERPL-SCNC: 142 MMOL/L (ref 136–145)
SP GR UR STRIP.AUTO: 1.02 (ref 1–1.03)
TRIGL SERPL-MCNC: 101 MG/DL
URATE SERPL-MCNC: 3.7 MG/DL (ref 4.2–8)
UROBILINOGEN UR QL STRIP.AUTO: 0.2 E.U./DL
WBC # BLD AUTO: 4.26 THOUSAND/UL (ref 4.31–10.16)
WBC #/AREA URNS AUTO: ABNORMAL /HPF

## 2019-09-13 PROCEDURE — 84550 ASSAY OF BLOOD/URIC ACID: CPT | Performed by: INTERNAL MEDICINE

## 2019-09-13 PROCEDURE — 81001 URINALYSIS AUTO W/SCOPE: CPT | Performed by: INTERNAL MEDICINE

## 2019-09-13 PROCEDURE — 80053 COMPREHEN METABOLIC PANEL: CPT | Performed by: INTERNAL MEDICINE

## 2019-09-13 PROCEDURE — 3008F BODY MASS INDEX DOCD: CPT | Performed by: INTERNAL MEDICINE

## 2019-09-13 PROCEDURE — 85025 COMPLETE CBC W/AUTO DIFF WBC: CPT

## 2019-09-13 PROCEDURE — 36415 COLL VENOUS BLD VENIPUNCTURE: CPT | Performed by: INTERNAL MEDICINE

## 2019-09-13 PROCEDURE — 80061 LIPID PANEL: CPT

## 2019-09-13 PROCEDURE — 83540 ASSAY OF IRON: CPT

## 2019-09-13 PROCEDURE — 82728 ASSAY OF FERRITIN: CPT

## 2019-09-13 PROCEDURE — 99214 OFFICE O/P EST MOD 30 MIN: CPT | Performed by: INTERNAL MEDICINE

## 2019-09-13 NOTE — PROGRESS NOTES
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[place]), visiting the [facility], and with his [employer]  He is also very physically active but he has given up using his elliptical  because of iliotibial band syndrome and has gained some weight and blood pressure is up a bit  Diet is optimal     We discussed using his outdoor bike and stationary bike, to improve physical activity  Regarding his hypertension, home readings are similar to today's and plan is to increase amlodipine to 10 mg daily, and I asked Demetrio Tran to call back regarding his blood pressure readings in about a week or 2  There been no recent attacks of gout on allopurinol and labs ordered today art for the near future and will include uric acid level and CB C  Arthritic symptoms have decreased with less physical activity that is high impact and plan is to restart exercises with low impact physical activity and avoid analgesics  There been no episodes of kidney stones, urinary habits are stable given BPH and treatment, with no hematuria, no flank discomfort and plans check urinalysis and CMP  Occasional insomnia is well treated with 12 5 mg of Benadryl night or half of a melatonin    He is noticing some trouble with finding words, but this is not terribly progressive, there is no short-term memory or long-term memory impairment, and on history exam, no evidence of Alzheimer's or other dementing disease including no evidence of Parkinson's  Plan is observation  HPI  There are no acute complaints we discussed follow-up which will be in mid February will be his adult wellness visit  I did encourage Gigi Rivas to call sooner for any questions or problems  The following portions of the patient's history were reviewed and updated as appropriate: allergies, current medications, past family history, past medical history, past social history, past surgical history and problem list     Review of Systems  good exercise tolerance with no chest pain or shortness of breath  Objective:      /88 (BP Location: Left arm, Patient Position: Sitting, Cuff Size: Standard)   Pulse 65   Ht 5' 7" (1 702 m)   Wt 75 4 kg (166 lb 3 2 oz)   SpO2 95%   BMI 26 03 kg/m²      Wt Readings from Last 3 Encounters:   09/13/19 75 4 kg (166 lb 3 2 oz)   02/22/19 74 6 kg (164 lb 6 4 oz)   02/13/19 74 8 kg (165 lb)     Temp Readings from Last 3 Encounters:   02/22/19 98 6 °F (37 °C) (Tympanic)   02/09/19 98 1 °F (36 7 °C) (Oral)   12/03/18 (!) 97 3 °F (36 3 °C) (Tympanic)     BP Readings from Last 3 Encounters:   09/13/19 148/88   02/22/19 131/61   02/13/19 120/76     Pulse Readings from Last 3 Encounters:   09/13/19 65   02/22/19 61   02/13/19 64        Physical Exam    Vital signs stable, very pleasant gentleman in no distress  Pulse regular  Skin without pallor or icterus  No JVD  Lungs clear and the cardiac exam is normal on auscultation  There is no peripheral edema  There are no tophi and there is no acute joint inflammation and joint function is well preserved  Gait is stable normal   Memory, other cognitive function intact except for some trouble with recalling names, and word-finding  No bradykinesia masked faces, no tremor rigidity, gait normal   Affect normal   Peripheral circulation is intact    Patient Active Problem List   Diagnosis    Essential hypertension    Prostatitis, chronic    History of total right hip arthroplasty    Hereditary and idiopathic peripheral neuropathy    Nephrolithiasis    Primary open angle glaucoma    History of abdominal hernia    Chronic gout    Gastroesophageal reflux disease without esophagitis    Rosacea    Diverticulosis    Glaucoma    Dyslipidemia    Osteoarthritis of spine with radiculopathy, lumbar region    Primary osteoarthritis involving multiple joints       Current Outpatient Medications on File Prior to Visit   Medication Sig Dispense Refill    acetaminophen (TYLENOL) 325 mg tablet Take 650 mg by mouth every 6 (six) hours as needed for mild pain      allopurinol (ZYLOPRIM) 300 mg tablet Take 1 tablet (300 mg total) by mouth daily 90 tablet 3    ALPHA LIPOIC ACID PO Take 1 tablet by mouth daily      amoxicillin (AMOXIL) 500 mg capsule Take 500 mg by mouth once as needed 4 caps before dental work      atorvastatin (LIPITOR) 10 mg tablet TAKE 1 TABLET BY MOUTH EVERY DAY 90 tablet 0    ferrous gluconate (FERGON) 324 mg tablet Take 324 mg by mouth daily with breakfast      gabapentin (NEURONTIN) 300 mg capsule Take 300 mg by mouth daily at bedtime      hydrochlorothiazide (HYDRODIURIL) 25 mg tablet Take 1 tablet (25 mg total) by mouth daily 90 tablet 5    influenza vaccine, high-dose (FLUZONE HIGH-DOSE) 0 5 ML RAHEEM TO BE ADMINISTERED BY PHARMACIST FOR IMMUNIZATION      losartan (COZAAR) 50 mg tablet Take 1 tablet (50 mg total) by mouth daily 90 tablet 3    metroNIDAZOLE (METROGEL) 1 % gel APPLY AT BEDTIME TO AFFECTED SKIN 60 g 2    Multiple Vitamin (MULTIVITAMIN) capsule Take 1 capsule by mouth daily      naproxen (NAPROSYN) 250 mg tablet Take 250 mg by mouth 2 (two) times a day as needed for mild pain      omeprazole (PriLOSEC) 20 mg delayed release capsule Take 20 mg by mouth daily      ondansetron (ZOFRAN-ODT) 4 mg disintegrating tablet Take 1 tablet (4 mg total) by mouth every 8 (eight) hours as needed for nausea or vomiting 10 tablet 0    oxymetazoline (AFRIN) 0 05 % nasal spray 2 sprays by Each Nare route 2 (two) times a day      ranitidine (ZANTAC) 150 MG capsule Take 150 mg by mouth daily at bedtime as needed for indigestion or heartburn      Saw Palmetto, Serenoa repens, (SAW PALMETTO PO) Take 1 capsule by mouth 3 (three) times a day        tamsulosin (FLOMAX) 0 4 mg Take 1 capsule (0 4 mg total) by mouth daily at bedtime 90 capsule 3    timolol (TIMOPTIC) 0 5 % ophthalmic solution INSTILL 1 DROP INTO RIGHT EYE IN THE MORNING  3    travoprost (TRAVATAN Z) 0 004 % ophthalmic solution Administer 1 drop to the right eye daily at bedtime      [DISCONTINUED] amLODIPine (NORVASC) 5 mg tablet TAKE 1 TABLET BY MOUTH EVERY DAY 90 tablet 3     No current facility-administered medications on file prior to visit

## 2019-09-16 ENCOUNTER — TELEPHONE (OUTPATIENT)
Dept: INTERNAL MEDICINE CLINIC | Facility: CLINIC | Age: 74
End: 2019-09-16

## 2019-09-16 NOTE — TELEPHONE ENCOUNTER
----- Message from Marlyn Lunsford MD sent at 9/15/2019  7:42 AM EDT -----  Please call Maximo Silva  His labs show a small amount of white cells in his urine but no crystals or red cells- keep up with fluids  His CMP is normal including blood sugar, liver and kidney tests as well as Calcium level  His CBC shows no anemia and the slight low WBC and elevated Mono count is consistent with exposure to a minor viral illness  His Iron and Ferritin levels are normal and both were low about one year ago  At this point, I don't think it is necessary to take any iron supplements

## 2019-09-20 DIAGNOSIS — I10 ESSENTIAL HYPERTENSION: Primary | ICD-10-CM

## 2019-09-20 RX ORDER — AMLODIPINE BESYLATE 10 MG/1
10 TABLET ORAL DAILY
Qty: 30 TABLET | Refills: 3 | Status: SHIPPED | OUTPATIENT
Start: 2019-09-20 | End: 2019-12-31 | Stop reason: SDUPTHER

## 2019-09-20 NOTE — TELEPHONE ENCOUNTER
Pt asked for a refill for amlodipine 10 mg  I do not see this on his med list, pt said he was on five and you had increased him to 10 mg  Advise?

## 2019-10-06 DIAGNOSIS — N40.0 BENIGN PROSTATIC HYPERPLASIA, UNSPECIFIED WHETHER LOWER URINARY TRACT SYMPTOMS PRESENT: ICD-10-CM

## 2019-10-07 RX ORDER — TAMSULOSIN HYDROCHLORIDE 0.4 MG/1
0.4 CAPSULE ORAL
Qty: 90 CAPSULE | Refills: 3 | Status: SHIPPED | OUTPATIENT
Start: 2019-10-07 | End: 2020-10-22 | Stop reason: SDUPTHER

## 2019-10-24 ENCOUNTER — TELEPHONE (OUTPATIENT)
Dept: INTERNAL MEDICINE CLINIC | Facility: CLINIC | Age: 74
End: 2019-10-24

## 2019-10-24 DIAGNOSIS — I10 ESSENTIAL HYPERTENSION: Chronic | ICD-10-CM

## 2019-10-24 RX ORDER — HYDROCHLOROTHIAZIDE 25 MG/1
25 TABLET ORAL DAILY
Qty: 90 TABLET | Refills: 3 | Status: SHIPPED | OUTPATIENT
Start: 2019-10-24 | End: 2020-10-04 | Stop reason: HOSPADM

## 2019-10-24 RX ORDER — LOSARTAN POTASSIUM 100 MG/1
50 TABLET ORAL DAILY
Qty: 90 TABLET | Refills: 3 | Status: SHIPPED | OUTPATIENT
Start: 2019-10-24 | End: 2019-11-13

## 2019-10-24 NOTE — TELEPHONE ENCOUNTER
Kennedy Quintanilla  Lets change our dose of Losartan to 100mg, which is what is actually being filled  Thanks

## 2019-10-24 NOTE — TELEPHONE ENCOUNTER
Dr Douglas Elizabeth  [facility] called regarding Buck's Losartan and HCTZ  We have the correct dose for the HCTZ, (25) but they had said he was on 100 mg of the Losartan? We only have that he is taking 1 of the 50 mg a day? Which mg do you want him to take? Thanks

## 2019-11-06 DIAGNOSIS — E78.5 DYSLIPIDEMIA: ICD-10-CM

## 2019-11-06 RX ORDER — ATORVASTATIN CALCIUM 10 MG/1
10 TABLET, FILM COATED ORAL DAILY
Qty: 90 TABLET | Refills: 0 | Status: SHIPPED | OUTPATIENT
Start: 2019-11-06 | End: 2020-02-07 | Stop reason: SDUPTHER

## 2019-11-13 ENCOUNTER — TELEPHONE (OUTPATIENT)
Dept: INTERNAL MEDICINE CLINIC | Facility: CLINIC | Age: 74
End: 2019-11-13

## 2019-11-13 DIAGNOSIS — I10 ESSENTIAL HYPERTENSION: Chronic | ICD-10-CM

## 2019-11-13 RX ORDER — LOSARTAN POTASSIUM 100 MG/1
100 TABLET ORAL DAILY
Qty: 90 TABLET | Refills: 3
Start: 2019-11-13 | End: 2020-12-07

## 2019-11-13 NOTE — TELEPHONE ENCOUNTER
Kennedy Woods,  The dose would then be 100 mg per day  Would you be able to call or should I resend with the correct rx?

## 2019-11-13 NOTE — TELEPHONE ENCOUNTER
[facility] called to confirm a medication refill on Losartan 100mg  The directions state to take 1/2 a tablet (50mg) daily and the patient advised the pharmacy that he is taking the whole tablet  I called the patient and spoke with him and he confirmed that he has always taken the whole tablet of Losartan 100mg daily  The patient and pharmacy would like conformation on the directions on how the medication should be taken  Please advised

## 2019-11-18 ENCOUNTER — APPOINTMENT (OUTPATIENT)
Dept: LAB | Facility: HOSPITAL | Age: 74
End: 2019-11-18
Payer: COMMERCIAL

## 2019-11-18 ENCOUNTER — TELEPHONE (OUTPATIENT)
Dept: INTERNAL MEDICINE CLINIC | Facility: CLINIC | Age: 74
End: 2019-11-18

## 2019-11-18 ENCOUNTER — TELEPHONE (OUTPATIENT)
Dept: UROLOGY | Facility: MEDICAL CENTER | Age: 74
End: 2019-11-18

## 2019-11-18 DIAGNOSIS — R39.9 UTI SYMPTOMS: Primary | ICD-10-CM

## 2019-11-18 DIAGNOSIS — R39.9 UTI SYMPTOMS: ICD-10-CM

## 2019-11-18 LAB
BACTERIA UR QL AUTO: ABNORMAL /HPF
BILIRUB UR QL STRIP: NEGATIVE
CLARITY UR: CLEAR
COLOR UR: YELLOW
GLUCOSE UR STRIP-MCNC: NEGATIVE MG/DL
HGB UR QL STRIP.AUTO: NEGATIVE
KETONES UR STRIP-MCNC: NEGATIVE MG/DL
LEUKOCYTE ESTERASE UR QL STRIP: ABNORMAL
NITRITE UR QL STRIP: NEGATIVE
NON-SQ EPI CELLS URNS QL MICRO: ABNORMAL /HPF
PH UR STRIP.AUTO: 5.5 [PH]
PROT UR STRIP-MCNC: NEGATIVE MG/DL
RBC #/AREA URNS AUTO: ABNORMAL /HPF
SP GR UR STRIP.AUTO: >=1.03 (ref 1–1.03)
UROBILINOGEN UR QL STRIP.AUTO: 0.2 E.U./DL
WBC #/AREA URNS AUTO: ABNORMAL /HPF

## 2019-11-18 PROCEDURE — 81001 URINALYSIS AUTO W/SCOPE: CPT

## 2019-11-18 PROCEDURE — 87086 URINE CULTURE/COLONY COUNT: CPT

## 2019-11-18 NOTE — TELEPHONE ENCOUNTER
Patient is managed by Dr Allred at the [facility] office  Patient is seen for nephrolithiasis and BPH and he also reports a history of prostatitis  Called and to patient  Patient reports that his symptoms started about 3 weeks ago  He did take a course of antibiotics that Dr weaver(PCP) gave him to self start if he was starting to have symptoms of prostatitis  Marleyprachi Hector is reporting that this antibiotic did not help  He is not sure what it was but thinks it might of been erythromycin  He took this the first week that he noticed the symptoms  Patient is reporting that he is having dysuria, an "ache" in his bladder, and frothy urine  Patient denies any fevers, chills, nausea, vomiting, hematuria, or any difficulty urinating  Patient is aware that he should stay well hydrated and have urine testing done to check for a possible infection  Urine culture and urinalysis were ordered  Patient reports that he will have this done at a [facility] lab today  Patient is aware that we will give him a call back with urine results once they are in

## 2019-11-18 NOTE — TELEPHONE ENCOUNTER
Patient of Dr Alin Aparicio seen at [facility] office  Patient reported to have prostatitis  Now experiencing frothy urine, with bladder and discomfort, burning with urination  No visible blood  No fever  Duration now 3 weeks  He can be reached at [phone]    Please leave message   Please advise  Thank you

## 2019-11-18 NOTE — TELEPHONE ENCOUNTER
Patient called stating he has "foamy" urine and he has some pain near his bladder, he is concerned for prostatitis due to his history  I instructed him to call his urologist and call back if they're unable to schedule him

## 2019-11-19 ENCOUNTER — TELEPHONE (OUTPATIENT)
Dept: UROLOGY | Facility: CLINIC | Age: 74
End: 2019-11-19

## 2019-11-19 LAB — BACTERIA UR CULT: NORMAL

## 2019-11-19 NOTE — TELEPHONE ENCOUNTER
----- Message from Keegan Irizarry PA-C sent at 11/19/2019  8:36 AM EST -----  Urine does not appear to be infected  I will continue to monitor for culture results

## 2019-11-20 ENCOUNTER — TELEPHONE (OUTPATIENT)
Dept: UROLOGY | Facility: CLINIC | Age: 74
End: 2019-11-20

## 2019-11-20 DIAGNOSIS — N41.1 PROSTATITIS, CHRONIC: Primary | Chronic | ICD-10-CM

## 2019-11-20 DIAGNOSIS — N20.0 NEPHROLITHIASIS: ICD-10-CM

## 2019-11-20 NOTE — TELEPHONE ENCOUNTER
Called and spoke to patient  explained to patient that his urine testing was negative for an infection  Patient stated that he is confused because he is still having pain  Patient stated that the pain feels like a kidney stone sitting at his bladder or prostatitis  Patient states that the pain is across his bladder  Patient stated that the pain is a 2/10 most days but when it is severe it's a 4/10  Patient stated that he has been using over the counter NSAIDS but can only take 1-2 naproxen a day due to ulcer issues  Patient was asking if he should be scheduled for an appointment to discuss  Explained to patient that this note will be sent to the PA for further recommendations and someone from the office will call with instructions  Patient understood           ----- Message from Dee Alexandre PA-C sent at 11/19/2019  8:07 PM EST -----  Urine testing is negative for infection

## 2019-11-20 NOTE — TELEPHONE ENCOUNTER
Called and had a lengthy discussion with patient  Patient offered to be scheduled for an appointment to discuss ongoing pain in bladder area  Patient stated that he does not want to be scheduled to discuss, he believes that he should have testing done prior  Patient also wants to have PSA checked again  After talking with PA in office, patient can be scheduled for PSA, US kidney and bladder and US of scrotum and testicles  Orders have been placed in the chart for this  Attempted to give patient central scheduling phone number but patient stated that he just got out of the shower and will call back when he is ready for this information  When patient returns call, please give [phone](central scheduling number to schedule US)  Explain to patient that once he has these tests complete, he is to call [phone] to schedule an appointment with our office([place] office preferred)

## 2019-11-20 NOTE — TELEPHONE ENCOUNTER
Called patient back and instructed patient that he is to call [phone](central scheduling number to schedule US)  Explained to patient that once he has these tests complete, he is to call [phone] to schedule an appointment with our office([place] office preferred)        Patient understood and will call our office when he is ready to schedule

## 2019-11-20 NOTE — TELEPHONE ENCOUNTER
Called patient back and instructed patient that he is to call [phone](central scheduling number to schedule US)  Explained to patient that once he has these tests complete, he is to call [phone] to schedule an appointment with our office([place] office preferred)  Patient understood and will call our office when he is ready to schedule

## 2019-11-22 ENCOUNTER — APPOINTMENT (OUTPATIENT)
Dept: LAB | Facility: HOSPITAL | Age: 74
End: 2019-11-22
Payer: COMMERCIAL

## 2019-11-22 ENCOUNTER — HOSPITAL ENCOUNTER (OUTPATIENT)
Dept: ULTRASOUND IMAGING | Facility: HOSPITAL | Age: 74
Discharge: HOME | End: 2019-11-22
Payer: COMMERCIAL

## 2019-11-22 DIAGNOSIS — N41.1 PROSTATITIS, CHRONIC: ICD-10-CM

## 2019-11-22 DIAGNOSIS — N20.0 NEPHROLITHIASIS: ICD-10-CM

## 2019-11-22 LAB — PSA SERPL-MCNC: 1.3 NG/ML (ref 0–4)

## 2019-11-22 PROCEDURE — 51798 US URINE CAPACITY MEASURE: CPT

## 2019-11-22 PROCEDURE — 36415 COLL VENOUS BLD VENIPUNCTURE: CPT

## 2019-11-22 PROCEDURE — 76870 US EXAM SCROTUM: CPT

## 2019-11-22 PROCEDURE — 84153 ASSAY OF PSA TOTAL: CPT

## 2019-12-31 DIAGNOSIS — I10 ESSENTIAL HYPERTENSION: ICD-10-CM

## 2019-12-31 RX ORDER — AMLODIPINE BESYLATE 10 MG/1
10 TABLET ORAL DAILY
Qty: 30 TABLET | Refills: 3 | Status: SHIPPED | OUTPATIENT
Start: 2019-12-31 | End: 2020-04-20 | Stop reason: SDUPTHER

## 2020-01-07 DIAGNOSIS — M10.9 GOUT, UNSPECIFIED CAUSE, UNSPECIFIED CHRONICITY, UNSPECIFIED SITE: ICD-10-CM

## 2020-01-08 RX ORDER — ALLOPURINOL 300 MG/1
300 TABLET ORAL DAILY
Qty: 90 TABLET | Refills: 3 | Status: SHIPPED | OUTPATIENT
Start: 2020-01-08

## 2020-01-17 ENCOUNTER — OFFICE VISIT (OUTPATIENT)
Dept: UROLOGY | Facility: CLINIC | Age: 75
End: 2020-01-17
Payer: COMMERCIAL

## 2020-01-17 VITALS
WEIGHT: 161 LBS | SYSTOLIC BLOOD PRESSURE: 142 MMHG | HEIGHT: 67 IN | HEART RATE: 69 BPM | DIASTOLIC BLOOD PRESSURE: 80 MMHG | BODY MASS INDEX: 25.27 KG/M2

## 2020-01-17 DIAGNOSIS — N41.1 PROSTATITIS, CHRONIC: Primary | ICD-10-CM

## 2020-01-17 DIAGNOSIS — N20.0 NEPHROLITHIASIS: ICD-10-CM

## 2020-01-17 PROCEDURE — 99213 OFFICE O/P EST LOW 20 MIN: CPT | Performed by: PHYSICIAN ASSISTANT

## 2020-01-17 PROCEDURE — 1160F RVW MEDS BY RX/DR IN RCRD: CPT | Performed by: PHYSICIAN ASSISTANT

## 2020-01-17 RX ORDER — SULFAMETHOXAZOLE AND TRIMETHOPRIM 800; 160 MG/1; MG/1
1 TABLET ORAL 2 TIMES DAILY
Qty: 14 TABLET | Refills: 2 | Status: SHIPPED | OUTPATIENT
Start: 2020-01-17 | End: 2020-01-24

## 2020-01-17 NOTE — PROGRESS NOTES
1/17/2020    Maite Mejia  1945  [id number]      Assessment/Plan  1  Nephrolithiasis  - reviewed most recent films  Patient is asymptomatic at this time and wishes for continued observation  Reviewed proper hydration and dietary modifications to minimize future stone formation  Follow-up 1 year KUB prior to visit  Encouraged contact us in the future with any signs and symptoms of a passing stone  All questions answered    2  History chronic prostatitis   - reviewed symptomatic measures for control  - self start bactrim sent to pharmacy if symptoms severe/persist despite symptomatic measures  3  BPH with lower urinary tract symptoms   - LUTS stable on tamsulosin monotherapy  - normal prostate exam in office today  - routine prostate cancer screening has been discontinued       1  Prostatitis, chronic  sulfamethoxazole-trimethoprim (BACTRIM DS) 800-160 mg per tablet   2  Nephrolithiasis  XR abdomen 1 view kub       History of Present Illness  69 y o  male with a history of nephrolithiasis and prostatitis presents today for follow up  Patient has distant history of ureteroscopy and lithotripsy x2 performed by Dr Olaf Smallwood and Dr Smitha Kirkland  He states spontaneously passing 15 kidney stones  Patient had US scrotum (11/22/19) revealing large left epididymal cyst  Patient reports that this has been stable and nonchanging over the course of the past few years  Denies any orchalgia  Ultrasound kidney and bladder with PVR (11/22/2019) revealing nonobstructing bilateral calyceal stones  No evidence of urinary tract obstruction  Postvoid residual approximately 7 mL  Patient is managed on tamsulosin monotherapy for lower urinary tract  He does take palmetto supplement  Patient has had symptoms of chronic intermittent prostatitis  Previously was on self start antibiotic therapy  Currently asymptomatic  Recent PSA of 1 3 (11/22/2019)        Urinary Incontinence Screening      Most Recent Value   Urinary Incontinence   Urinary Incontinence? No   Incomplete emptying? Yes [depending if it is day or night ]   Urinary frequency? No   Urinary urgency? No   Urinary hesitancy? No   Dysuria (painful difficult urination)? No   Nocturia (waking up to use the bathroom)? No [1x]   Straining (having to push to go)? Yes [at night ]   Weak stream?  Yes [only at night ]   Intermittent stream?  Yes [only at night ]   Post void dribbling? No          Review of Systems  Review of Systems   Constitutional: Negative  HENT: Negative  Respiratory: Negative  Cardiovascular: Negative  Gastrointestinal: Negative  Genitourinary: Positive for flank pain (right sided )  Negative for decreased urine volume, difficulty urinating, dysuria, frequency, hematuria and urgency  Skin: Negative  Neurological: Negative  Psychiatric/Behavioral: Negative          Past Medical History  Past Medical History:   Diagnosis Date    Age-related cataract of both eyes 6/30/2017    Arthritis     Benign colon polyp     /last assessed 6/17/2015    Bilateral inguinal hernia     Cellulitis of leg, left 6/30/2017    Chronic gout 6/30/2017    Essential hypertension 6/30/2017    Gastroesophageal reflux disease without esophagitis 6/30/2017    GERD (gastroesophageal reflux disease)     Gout     Herpes simplex type 1 infection     last assessed 12/18/2014    History of abdominal hernia 6/30/2017    History of pneumonia     History of prostatitis     History of total right hip arthroplasty 6/30/2017    History of total right hip replacement     Hyperlipidemia     Hypertension     Idiopathic peripheral neuropathy 6/30/2017    Kidney stone     Left inguinal hernia     Lyme disease     last assessed 7/10/2017    Nephrolithiasis 6/30/2017    Peripheral neuropathy     Podagra     last assessed 12/5/2014    Primary open angle glaucoma 6/30/2017    Prostatitis, chronic 6/30/2017    Rosacea 6/30/2017       Past Social History  Past Surgical History:   Procedure Laterality Date    ABDOMINAL SURGERY      CATARACT EXTRACTION      COLONOSCOPY      last assessed 6/17/2015    DUPUYTREN / PALMAR FASCIOTOMY      last assessed 6/17/2015    DUPUYTREN CONTRACTURE RELEASE Left     ESOPHAGOGASTRODUODENOSCOPY      HERNIA REPAIR Right     inguinal    HERNIA REPAIR Left 2/9/2017    Procedure: REPAIR HERNIA INGUINAL, LAPAROSCOPIC WITH MESH;  Surgeon: Dania Lamas MD;  Location: [facility] OR;  Service:    Terese Grates Bilateral 2011    with mesh done at [facility]      hip replacement    LITHOTRIPSY      AK COLONOSCOPY FLX DX W/COLLJ [place] 1978 PFRMD N/A 10/31/2018    Procedure: EGD AND COLONOSCOPY;  Surgeon: January Dasilva MD;  Location: [facility]; Service: Gastroenterology    TONSILLECTOMY      TOTAL HIP ARTHROPLASTY Right     last assessed 12/5/2014       Past Family History  Family History   Problem Relation Age of Onset    Coronary artery disease Mother     Cancer Father        Past Social history  Social History     Socioeconomic History    Marital status:      Spouse name: Not on file    Number of children: Not on file    Years of education: Not on file    Highest education level: Not on file   Occupational History    Occupation: retired   Social Needs    Financial resource strain: Not on file    Food insecurity:     Worry: Not on file     Inability: Not on file   [employer] needs:     Medical: Not on file     Non-medical: Not on file   Tobacco Use    Smoking status: Never Smoker    Smokeless tobacco: Never Used    Tobacco comment: per allscripts ' former smoker / never a smoker '   Substance and Sexual Activity    Alcohol use:  Yes     Alcohol/week: 1 0 standard drinks     Types: 1 Glasses of wine per week     Comment: daily with dinner/occas beer    Drug use: No    Sexual activity: Not on file   Lifestyle    Physical activity:     Days per week: Not on file     Minutes per session: Not on file    Stress: Not on file   Relationships    Social connections:     Talks on phone: Not on file     Gets together: Not on file     Attends Yazidi service: Not on file     Active member of club or organization: Not on file     Attends meetings of clubs or organizations: Not on file     Relationship status: Not on file    Intimate partner violence:     Fear of current or ex partner: Not on file     Emotionally abused: Not on file     Physically abused: Not on file     Forced sexual activity: Not on file   Other Topics Concern    Not on file   Social History Narrative    Not on file       Current Medications  Current Outpatient Medications   Medication Sig Dispense Refill    acetaminophen (TYLENOL) 325 mg tablet Take 650 mg by mouth every 6 (six) hours as needed for mild pain      allopurinol (ZYLOPRIM) 300 mg tablet Take 1 tablet (300 mg total) by mouth daily 90 tablet 3    ALPHA LIPOIC ACID PO Take 1 tablet by mouth daily      amLODIPine (NORVASC) 10 mg tablet Take 1 tablet (10 mg total) by mouth daily 30 tablet 3    amoxicillin (AMOXIL) 500 mg capsule Take 500 mg by mouth once as needed 4 caps before dental work      atorvastatin (LIPITOR) 10 mg tablet Take 1 tablet (10 mg total) by mouth daily 90 tablet 0    ferrous gluconate (FERGON) 324 mg tablet Take 324 mg by mouth daily with breakfast      gabapentin (NEURONTIN) 300 mg capsule Take 300 mg by mouth daily at bedtime      hydrochlorothiazide (HYDRODIURIL) 25 mg tablet Take 1 tablet (25 mg total) by mouth daily 90 tablet 3    influenza vaccine, high-dose (FLUZONE HIGH-DOSE) 0 5 ML RAHEEM TO BE ADMINISTERED BY PHARMACIST FOR IMMUNIZATION      losartan (COZAAR) 100 MG tablet Take 1 tablet (100 mg total) by mouth daily 90 tablet 3    metroNIDAZOLE (METROGEL) 1 % gel APPLY AT BEDTIME TO AFFECTED SKIN 60 g 2    Multiple Vitamin (MULTIVITAMIN) capsule Take 1 capsule by mouth daily      naproxen (NAPROSYN) 250 mg tablet Take 250 mg by mouth 2 (two) times a day as needed for mild pain      omeprazole (PriLOSEC) 20 mg delayed release capsule Take 20 mg by mouth daily      ondansetron (ZOFRAN-ODT) 4 mg disintegrating tablet Take 1 tablet (4 mg total) by mouth every 8 (eight) hours as needed for nausea or vomiting 10 tablet 0    oxymetazoline (AFRIN) 0 05 % nasal spray 2 sprays by Each Nare route 2 (two) times a day      ranitidine (ZANTAC) 150 MG capsule Take 150 mg by mouth daily at bedtime as needed for indigestion or heartburn      Saw Palmetto, Serenoa repens, (SAW PALMETTO PO) Take 1 capsule by mouth 3 (three) times a day        tamsulosin (FLOMAX) 0 4 mg TAKE 1 CAPSULE (0 4 MG TOTAL) BY MOUTH DAILY AT BEDTIME 90 capsule 3    timolol (TIMOPTIC) 0 5 % ophthalmic solution INSTILL 1 DROP INTO RIGHT EYE IN THE MORNING  3    travoprost (TRAVATAN Z) 0 004 % ophthalmic solution Administer 1 drop to the right eye daily at bedtime      sulfamethoxazole-trimethoprim (BACTRIM DS) 800-160 mg per tablet Take 1 tablet by mouth 2 (two) times a day for 7 days 14 tablet 2     No current facility-administered medications for this visit  Allergies  No Known Allergies    Past Medical History, Social History, Family History, medications and allergies were reviewed  Vitals  Vitals:    01/17/20 1047   BP: 142/80   Pulse: 69   Weight: 73 kg (161 lb)   Height: 5' 7" (1 702 m)       Physical Exam  Physical Exam   Constitutional: He is oriented to person, place, and time  He appears well-developed and well-nourished  HENT:   Head: Normocephalic  Eyes: Pupils are equal, round, and reactive to light  Neck: Normal range of motion  Cardiovascular: Normal rate and regular rhythm  Pulmonary/Chest: Effort normal    Abdominal: Soft  Normal appearance  He exhibits no distension  There is no tenderness  There is no CVA tenderness  Genitourinary:   Genitourinary Comments: Negative CVA tenderness   Good rectal tone  Prostate 40g, smooth, symmetric, no nodules, non-boggy   Musculoskeletal: Normal range of motion  Neurological: He is alert and oriented to person, place, and time  Skin: Skin is warm and dry  Psychiatric: He has a normal mood and affect  His behavior is normal  Judgment and thought content normal        Results    I have personally reviewed all pertinent lab results and reviewed with patient  Lab Results   Component Value Date    PSA 1 3 11/22/2019    PSA 1 5 01/07/2019    PSA 2 0 10/26/2017     Lab Results   Component Value Date    CALCIUM 9 3 09/13/2019    K 3 6 09/13/2019    CO2 30 09/13/2019     09/13/2019    BUN 23 09/13/2019    CREATININE 0 93 09/13/2019     Lab Results   Component Value Date    WBC 4 26 (L) 09/13/2019    HGB 15 6 09/13/2019    HCT 49 3 09/13/2019    MCV 91 09/13/2019     09/13/2019     No results found for this or any previous visit (from the past 1 hour(s))

## 2020-02-07 DIAGNOSIS — E78.5 DYSLIPIDEMIA: ICD-10-CM

## 2020-02-07 RX ORDER — ATORVASTATIN CALCIUM 10 MG/1
10 TABLET, FILM COATED ORAL DAILY
Qty: 20 TABLET | Refills: 0 | Status: SHIPPED | OUTPATIENT
Start: 2020-02-07 | End: 2020-03-11

## 2020-02-26 ENCOUNTER — OFFICE VISIT (OUTPATIENT)
Dept: FAMILY MEDICINE CLINIC | Facility: CLINIC | Age: 75
End: 2020-02-26
Payer: COMMERCIAL

## 2020-02-26 VITALS
HEIGHT: 67 IN | HEART RATE: 78 BPM | BODY MASS INDEX: 25.49 KG/M2 | OXYGEN SATURATION: 95 % | TEMPERATURE: 98.6 F | WEIGHT: 162.4 LBS | RESPIRATION RATE: 18 BRPM | DIASTOLIC BLOOD PRESSURE: 82 MMHG | SYSTOLIC BLOOD PRESSURE: 138 MMHG

## 2020-02-26 DIAGNOSIS — Z00.00 MEDICARE ANNUAL WELLNESS VISIT, SUBSEQUENT: ICD-10-CM

## 2020-02-26 DIAGNOSIS — I10 ESSENTIAL HYPERTENSION: Primary | Chronic | ICD-10-CM

## 2020-02-26 DIAGNOSIS — K21.9 GASTROESOPHAGEAL REFLUX DISEASE WITHOUT ESOPHAGITIS: Chronic | ICD-10-CM

## 2020-02-26 DIAGNOSIS — L71.9 ROSACEA: Chronic | ICD-10-CM

## 2020-02-26 DIAGNOSIS — M1A.9XX0 CHRONIC GOUT WITHOUT TOPHUS, UNSPECIFIED CAUSE, UNSPECIFIED SITE: Chronic | ICD-10-CM

## 2020-02-26 DIAGNOSIS — E78.5 DYSLIPIDEMIA: ICD-10-CM

## 2020-02-26 DIAGNOSIS — G60.9 HEREDITARY AND IDIOPATHIC PERIPHERAL NEUROPATHY: Chronic | ICD-10-CM

## 2020-02-26 PROBLEM — H40.9 GLAUCOMA: Status: RESOLVED | Noted: 2017-01-12 | Resolved: 2020-02-26

## 2020-02-26 PROBLEM — M15.0 PRIMARY OSTEOARTHRITIS INVOLVING MULTIPLE JOINTS: Status: RESOLVED | Noted: 2018-11-16 | Resolved: 2020-02-26

## 2020-02-26 PROBLEM — M15.9 PRIMARY OSTEOARTHRITIS INVOLVING MULTIPLE JOINTS: Status: RESOLVED | Noted: 2018-11-16 | Resolved: 2020-02-26

## 2020-02-26 PROCEDURE — 1125F AMNT PAIN NOTED PAIN PRSNT: CPT | Performed by: INTERNAL MEDICINE

## 2020-02-26 PROCEDURE — 4040F PNEUMOC VAC/ADMIN/RCVD: CPT | Performed by: INTERNAL MEDICINE

## 2020-02-26 PROCEDURE — 3075F SYST BP GE 130 - 139MM HG: CPT | Performed by: INTERNAL MEDICINE

## 2020-02-26 PROCEDURE — 1170F FXNL STATUS ASSESSED: CPT | Performed by: INTERNAL MEDICINE

## 2020-02-26 PROCEDURE — 99214 OFFICE O/P EST MOD 30 MIN: CPT | Performed by: INTERNAL MEDICINE

## 2020-02-26 PROCEDURE — 1036F TOBACCO NON-USER: CPT | Performed by: INTERNAL MEDICINE

## 2020-02-26 PROCEDURE — 3008F BODY MASS INDEX DOCD: CPT | Performed by: INTERNAL MEDICINE

## 2020-02-26 PROCEDURE — 3079F DIAST BP 80-89 MM HG: CPT | Performed by: INTERNAL MEDICINE

## 2020-02-26 PROCEDURE — 1160F RVW MEDS BY RX/DR IN RCRD: CPT | Performed by: INTERNAL MEDICINE

## 2020-02-26 PROCEDURE — G0439 PPPS, SUBSEQ VISIT: HCPCS | Performed by: INTERNAL MEDICINE

## 2020-02-26 RX ORDER — GABAPENTIN 100 MG/1
100 CAPSULE ORAL 3 TIMES DAILY
Qty: 270 CAPSULE | Refills: 0 | Status: SHIPPED | OUTPATIENT
Start: 2020-02-26 | End: 2020-05-19

## 2020-02-26 RX ORDER — GABAPENTIN 300 MG/1
300 CAPSULE ORAL
Start: 2020-02-26 | End: 2020-02-26 | Stop reason: SDUPTHER

## 2020-02-26 RX ORDER — OMEPRAZOLE 20 MG/1
20 CAPSULE, DELAYED RELEASE ORAL DAILY
Start: 2020-02-26

## 2020-02-26 NOTE — PROGRESS NOTES
Assessment and Plan:     Problem List Items Addressed This Visit     None           Preventive health issues were discussed with patient, and age appropriate screening tests were ordered as noted in patient's After Visit Summary  Personalized health advice and appropriate referrals for health education or preventive services given if needed, as noted in patient's After Visit Summary       History of Present Illness:     Patient presents for Medicare Annual Wellness visit    Patient Care Team:  Grace Lee MD as PCP - JAN Oneill MD Suzzanna Berth, MD as Endoscopist     Problem List:     Patient Active Problem List   Diagnosis    Essential hypertension    Prostatitis, chronic    History of total right hip arthroplasty    Hereditary and idiopathic peripheral neuropathy    Nephrolithiasis    Primary open angle glaucoma    History of abdominal hernia    Chronic gout    Gastroesophageal reflux disease without esophagitis    Rosacea    Diverticulosis    Glaucoma    Dyslipidemia    Osteoarthritis of spine with radiculopathy, lumbar region    Primary osteoarthritis involving multiple joints      Past Medical and Surgical History:     Past Medical History:   Diagnosis Date    Age-related cataract of both eyes 6/30/2017    Arthritis     Benign colon polyp     /last assessed 6/17/2015    Bilateral inguinal hernia     Cellulitis of leg, left 6/30/2017    Chronic gout 6/30/2017    Essential hypertension 6/30/2017    Gastroesophageal reflux disease without esophagitis 6/30/2017    GERD (gastroesophageal reflux disease)     Gout     Herpes simplex type 1 infection     last assessed 12/18/2014    History of abdominal hernia 6/30/2017    History of pneumonia     History of prostatitis     History of total right hip arthroplasty 6/30/2017    History of total right hip replacement     Hyperlipidemia     Hypertension     Idiopathic peripheral neuropathy 6/30/2017    Kidney stone     Left inguinal hernia     Lyme disease     last assessed 7/10/2017    Nephrolithiasis 6/30/2017    Peripheral neuropathy     Podagra     last assessed 12/5/2014    Primary open angle glaucoma 6/30/2017    Prostatitis, chronic 6/30/2017    Rosacea 6/30/2017     Past Surgical History:   Procedure Laterality Date    ABDOMINAL SURGERY      CATARACT EXTRACTION      COLONOSCOPY      last assessed 6/17/2015    DUPUYTREN / PALMAR FASCIOTOMY      last assessed 6/17/2015    DUPUYTREN CONTRACTURE RELEASE Left     ESOPHAGOGASTRODUODENOSCOPY      HERNIA REPAIR Right     inguinal    HERNIA REPAIR Left 2/9/2017    Procedure: REPAIR HERNIA INGUINAL, LAPAROSCOPIC WITH MESH;  Surgeon: Reena Antoine MD;  Location: [facility] OR;  Service:    [facility] Bilateral 2011    with mesh done at [facility] [facility]      hip replacement    LITHOTRIPSY      MD COLONOSCOPY FLX DX W/COLLJ [place] 1978 PFRMD N/A 10/31/2018    Procedure: EGD AND COLONOSCOPY;  Surgeon: Shireen Ramon MD;  Location: [facility]; Service: Gastroenterology    TONSILLECTOMY      TOTAL HIP ARTHROPLASTY Right     last assessed 12/5/2014      Family History:     Family History   Problem Relation Age of Onset    Coronary artery disease Mother     Cancer Father       Social History:        Social History     Socioeconomic History    Marital status:       Spouse name: Not on file    Number of children: Not on file    Years of education: Not on file    Highest education level: Not on file   Occupational History    Occupation: retired   Social Needs    Financial resource strain: Not on file    Food insecurity:     Worry: Not on file     Inability: Not on file   [employer] needs:     Medical: Not on file     Non-medical: Not on file   Tobacco Use    Smoking status: Never Smoker    Smokeless tobacco: Never Used    Tobacco comment: per allscripts ' former smoker / never a smoker ' Substance and Sexual Activity    Alcohol use:  Yes     Alcohol/week: 1 0 standard drinks     Types: 1 Glasses of wine per week     Comment: daily with dinner/occas beer    Drug use: No    Sexual activity: Not on file   Lifestyle    Physical activity:     Days per week: Not on file     Minutes per session: Not on file    Stress: Not on file   Relationships    Social connections:     Talks on phone: Not on file     Gets together: Not on file     Attends Lutheran service: Not on file     Active member of club or organization: Not on file     Attends meetings of clubs or organizations: Not on file     Relationship status: Not on file    Intimate partner violence:     Fear of current or ex partner: Not on file     Emotionally abused: Not on file     Physically abused: Not on file     Forced sexual activity: Not on file   Other Topics Concern    Not on file   Social History Narrative    Not on file      Medications and Allergies:     Current Outpatient Medications   Medication Sig Dispense Refill    acetaminophen (TYLENOL) 325 mg tablet Take 650 mg by mouth every 6 (six) hours as needed for mild pain      allopurinol (ZYLOPRIM) 300 mg tablet Take 1 tablet (300 mg total) by mouth daily 90 tablet 3    ALPHA LIPOIC ACID PO Take 1 tablet by mouth daily      amLODIPine (NORVASC) 10 mg tablet Take 1 tablet (10 mg total) by mouth daily 30 tablet 3    amoxicillin (AMOXIL) 500 mg capsule Take 500 mg by mouth once as needed 4 caps before dental work      atorvastatin (LIPITOR) 10 mg tablet Take 1 tablet (10 mg total) by mouth daily 20 tablet 0    ferrous gluconate (FERGON) 324 mg tablet Take 324 mg by mouth daily with breakfast      gabapentin (NEURONTIN) 300 mg capsule Take 300 mg by mouth daily at bedtime      hydrochlorothiazide (HYDRODIURIL) 25 mg tablet Take 1 tablet (25 mg total) by mouth daily 90 tablet 3    influenza vaccine, high-dose (FLUZONE HIGH-DOSE) 0 5 ML RAHEEM TO BE ADMINISTERED BY PHARMACIST FOR IMMUNIZATION      losartan (COZAAR) 100 MG tablet Take 1 tablet (100 mg total) by mouth daily 90 tablet 3    metroNIDAZOLE (METROGEL) 1 % gel APPLY AT BEDTIME TO AFFECTED SKIN 60 g 2    Multiple Vitamin (MULTIVITAMIN) capsule Take 1 capsule by mouth daily      naproxen (NAPROSYN) 250 mg tablet Take 250 mg by mouth 2 (two) times a day as needed for mild pain      omeprazole (PriLOSEC) 20 mg delayed release capsule Take 20 mg by mouth daily      ondansetron (ZOFRAN-ODT) 4 mg disintegrating tablet Take 1 tablet (4 mg total) by mouth every 8 (eight) hours as needed for nausea or vomiting 10 tablet 0    oxymetazoline (AFRIN) 0 05 % nasal spray 2 sprays by Each Nare route 2 (two) times a day      ranitidine (ZANTAC) 150 MG capsule Take 150 mg by mouth daily at bedtime as needed for indigestion or heartburn      Saw Palmetto, Serenoa repens, (SAW PALMETTO PO) Take 1 capsule by mouth 3 (three) times a day        tamsulosin (FLOMAX) 0 4 mg TAKE 1 CAPSULE (0 4 MG TOTAL) BY MOUTH DAILY AT BEDTIME 90 capsule 3    timolol (TIMOPTIC) 0 5 % ophthalmic solution INSTILL 1 DROP INTO RIGHT EYE IN THE MORNING  3    travoprost (TRAVATAN Z) 0 004 % ophthalmic solution Administer 1 drop to the right eye daily at bedtime       No current facility-administered medications for this visit        No Known Allergies   Immunizations:     Immunization History   Administered Date(s) Administered    H1N1, All Formulations 02/02/2010    INFLUENZA 11/06/2003, 11/01/2006, 01/03/2013, 02/04/2015, 09/10/2018    Influenza Split High Dose Preservative Free IM 10/01/2016, 09/10/2018    Influenza TIV (IM) 10/28/2015    Pneumococcal Conjugate 13-Valent 06/18/2015    Zoster Vaccine Recombinant 03/29/2019    influenza, trivalent, adjuvanted 09/05/2019      Health Maintenance:         Topic Date Due    Hepatitis C Screening  1945    CRC Screening: Colonoscopy  10/31/2028         Topic Date Due    DTaP,Tdap,and Td Vaccines (1 - Tdap) 06/19/1958    Pneumococcal Vaccine: 65+ Years (2 of 2 - PPSV23) 06/18/2016      Medicare Health Risk Assessment:     There were no vitals taken for this visit  Milli Bush is here for his Subsequent Wellness visit  Health Risk Assessment:   Patient rates overall health as good  Patient feels that their physical health rating is same  Eyesight was rated as same  Hearing was rated as same  Patient feels that their emotional and mental health rating is same  Pain experienced in the last 7 days has been some  Patient's pain rating has been 3/10  Patient states that he has experienced no weight loss or gain in last 6 months  Depression Screening:   PHQ-2 Score: 0      Fall Risk Screening: In the past year, patient has experienced: history of falling in past year    Number of falls: 1  Injured during fall?: No    Feels unsteady when standing or walking?: No    Worried about falling?: No      Home Safety:  Patient does not have trouble with stairs inside or outside of their home  Patient has working smoke alarms and has working carbon monoxide detector  Home safety hazards include: medications that cause fatigue  Nutrition:   Current diet is No Added Salt  Medications:   Patient is currently taking over-the-counter supplements  OTC medications include: see medication list  Patient is able to manage medications  Activities of Daily Living (ADLs)/Instrumental Activities of Daily Living (IADLs):   Walk and transfer into and out of bed and chair?: Yes  Dress and groom yourself?: Yes    Bathe or shower yourself?: Yes    Feed yourself? Yes  Do your laundry/housekeeping?: Yes  Manage your money, pay your bills and track your expenses?: Yes  Make your own meals?: Yes    Do your own shopping?: Yes    Previous Hospitalizations:   Any hospitalizations or ED visits within the last 12 months?: No      Advance Care Planning:   Living will: Yes    Durable POA for healthcare: Yes    Advanced directive:  Yes Advanced directive counseling given: No      Cognitive Screening:   Provider or family/friend/caregiver concerned regarding cognition?: No    PREVENTIVE SCREENINGS      Cardiovascular Screening:    General: Screening Current      Diabetes Screening:     General: Screening Current      Colorectal Cancer Screening:     General: Screening Current      Prostate Cancer Screening:    General: Screening Current      Abdominal Aortic Aneurysm (AAA) Screening:    Risk factors include: age between 73-69 yo        Lung Cancer Screening:     General: Screening Not Indicated      Hepatitis C Screening:    General: Screening Not Indicated      [place], DO

## 2020-02-26 NOTE — PATIENT INSTRUCTIONS
Medicare Preventive Visit Patient Instructions  Thank you for completing your Welcome to Medicare Visit or Medicare Annual Wellness Visit today  Your next wellness visit will be due in one year (2/26/2021)  The screening/preventive services that you may require over the next 5-10 years are detailed below  Some tests may not apply to you based off risk factors and/or age  Screening tests ordered at today's visit but not completed yet may show as past due  Also, please note that scanned in results may not display below  Preventive Screenings:  Service Recommendations Previous Testing/Comments   Colorectal Cancer Screening  · Colonoscopy    · Fecal Occult Blood Test (FOBT)/Fecal Immunochemical Test (FIT)  · Fecal DNA/Cologuard Test  · Flexible Sigmoidoscopy Age: 54-65 years old   Colonoscopy: every 10 years (May be performed more frequently if at higher risk)  OR  FOBT/FIT: every 1 year  OR  Cologuard: every 3 years  OR  Sigmoidoscopy: every 5 years  Screening may be recommended earlier than age 48 if at higher risk for colorectal cancer  Also, an individualized decision between you and your healthcare provider will decide whether screening between the ages of 74-80 would be appropriate   Colonoscopy: 10/31/2018  FOBT/FIT: Not on file  Cologuard: Not on file  Sigmoidoscopy: Not on file    Screening Current     Prostate Cancer Screening Individualized decision between patient and health care provider in men between ages of 53-78   Medicare will cover every 12 months beginning on the day after your 50th birthday PSA: 1 3 ng/mL     Screening Current     Hepatitis C Screening Once for adults born between 1945 and 1965  More frequently in patients at high risk for Hepatitis C Hep C Antibody: Not on file       Diabetes Screening 1-2 times per year if you're at risk for diabetes or have pre-diabetes Fasting glucose: No results in last 5 years   A1C: No results in last 5 years    Screening Current   Cholesterol Screening Once every 5 years if you don't have a lipid disorder  May order more often based on risk factors  Lipid panel: 09/13/2019    Screening Current      Other Preventive Screenings Covered by Medicare:  1  Abdominal Aortic Aneurysm (AAA) Screening: covered once if your at risk  You're considered to be at risk if you have a family history of AAA or a male between the age of 73-68 who smoking at least 100 cigarettes in your lifetime  2  Lung Cancer Screening: covers low dose CT scan once per year if you meet all of the following conditions: (1) Age 50-69; (2) No signs or symptoms of lung cancer; (3) Current smoker or have quit smoking within the last 15 years; (4) You have a tobacco smoking history of at least 30 pack years (packs per day x number of years you smoked); (5) You get a written order from a healthcare provider  3  Glaucoma Screening: covered annually if you're considered high risk: (1) You have diabetes OR (2) Family history of glaucoma OR (3)  aged 48 and older OR (3)  American aged 72 and older  3  Osteoporosis Screening: covered every 2 years if you meet one of the following conditions: (1) Have a vertebral abnormality; (2) On glucocorticoid therapy for more than 3 months; (3) Have primary hyperparathyroidism; (4) On osteoporosis medications and need to assess response to drug therapy  5  HIV Screening: covered annually if you're between the age of 12-76  Also covered annually if you are younger than 13 and older than 72 with risk factors for HIV infection  For pregnant patients, it is covered up to 3 times per pregnancy      Immunizations:  Immunization Recommendations   Influenza Vaccine Annual influenza vaccination during flu season is recommended for all persons aged >= 6 months who do not have contraindications   Pneumococcal Vaccine (Prevnar and Pneumovax)  * Prevnar = PCV13  * Pneumovax = PPSV23 Adults 25-60 years old: 1-3 doses may be recommended based on certain risk factors  Adults 72 years old: Prevnar (PCV13) vaccine recommended followed by Pneumovax (PPSV23) vaccine  If already received PPSV23 since turning 65, then PCV13 recommended at least one year after PPSV23 dose  Hepatitis B Vaccine 3 dose series if at intermediate or high risk (ex: diabetes, end stage renal disease, liver disease)   Tetanus (Td) Vaccine - COST NOT COVERED BY MEDICARE PART B Following completion of primary series, a booster dose should be given every 10 years to maintain immunity against tetanus  Td may also be given as tetanus wound prophylaxis  Tdap Vaccine - COST NOT COVERED BY MEDICARE PART B Recommended at least once for all adults  For pregnant patients, recommended with each pregnancy  Shingles Vaccine (Shingrix) - COST NOT COVERED BY MEDICARE PART B  2 shot series recommended in those aged 48 and above     Health Maintenance Due:      Topic Date Due    Hepatitis C Screening  1945    CRC Screening: Colonoscopy  10/31/2028     Immunizations Due:      Topic Date Due    DTaP,Tdap,and Td Vaccines (1 - Tdap) 06/19/1956    Pneumococcal Vaccine: 65+ Years (2 of 2 - PPSV23) 06/18/2016     Advance Directives   What are advance directives? Advance directives are legal documents that state your wishes and plans for medical care  These plans are made ahead of time in case you lose your ability to make decisions for yourself  Advance directives can apply to any medical decision, such as the treatments you want, and if you want to donate organs  What are the types of advance directives? There are many types of advance directives, and each state has rules about how to use them  You may choose a combination of any of the following:  · Living will: This is a written record of the treatment you want  You can also choose which treatments you do not want, which to limit, and which to stop at a certain time  This includes surgery, medicine, IV fluid, and tube feedings     · Durable power of  for [facility]): This is a written record that states who you want to make healthcare choices for you when you are unable to make them for yourself  This person, called a proxy, is usually a family member or a friend  You may choose more than 1 proxy  · Do not resuscitate (DNR) order:  A DNR order is used in case your heart stops beating or you stop breathing  It is a request not to have certain forms of treatment, such as CPR  A DNR order may be included in other types of advance directives  · Medical directive: This covers the care that you want if you are in a coma, near death, or unable to make decisions for yourself  You can list the treatments you want for each condition  Treatment may include pain medicine, surgery, blood transfusions, dialysis, IV or tube feedings, and a ventilator (breathing machine)  · Values history: This document has questions about your views, beliefs, and how you feel and think about life  This information can help others choose the care that you would choose  Why are advance directives important? An advance directive helps you control your care  Although spoken wishes may be used, it is better to have your wishes written down  Spoken wishes can be misunderstood, or not followed  Treatments may be given even if you do not want them  An advance directive may make it easier for your family to make difficult choices about your care  Fall Prevention    Fall prevention  includes ways to make your home and other areas safer  It also includes ways you can move more carefully to prevent a fall  Health conditions that cause changes in your blood pressure, vision, or muscle strength and coordination may increase your risk for falls  Medicines may also increase your risk for falls if they make you dizzy, weak, or sleepy  Fall prevention tips:   · Stand or sit up slowly  · Use assistive devices as directed  · Wear shoes that fit well and have soles that   · Wear a personal alarm  · Stay active  · Manage your medical conditions  Home Safety Tips:  · Add items to prevent falls in the bathroom  · Keep paths clear  · Install bright lights in your home  · Keep items you use often on shelves within reach  · Paint or place reflective tape on the edges of your stairs  Weight Management   Why it is important to manage your weight:  Being overweight increases your risk of health conditions such as heart disease, high blood pressure, type 2 diabetes, and certain types of cancer  It can also increase your risk for osteoarthritis, sleep apnea, and other respiratory problems  Aim for a slow, steady weight loss  Even a small amount of weight loss can lower your risk of health problems  How to lose weight safely:  A safe and healthy way to lose weight is to eat fewer calories and get regular exercise  You can lose up about 1 pound a week by decreasing the number of calories you eat by 500 calories each day  Healthy meal plan for weight management:  A healthy meal plan includes a variety of foods, contains fewer calories, and helps you stay healthy  A healthy meal plan includes the following:  · Eat whole-grain foods more often  A healthy meal plan should contain fiber  Fiber is the part of grains, fruits, and vegetables that is not broken down by your body  Whole-grain foods are healthy and provide extra fiber in your diet  Some examples of whole-grain foods are whole-wheat breads and pastas, oatmeal, brown rice, and bulgur  · Eat a variety of vegetables every day  Include dark, leafy greens such as spinach, kale, brianna greens, and mustard greens  Eat yellow and orange vegetables such as carrots, sweet potatoes, and winter squash  · Eat a variety of fruits every day  Choose fresh or canned fruit (canned in its own juice or light syrup) instead of juice  Fruit juice has very little or no fiber  · Eat low-fat dairy foods    Drink fat-free (skim) milk or 1% milk  Eat fat-free yogurt and low-fat cottage cheese  Try low-fat cheeses such as mozzarella and other reduced-fat cheeses  · Choose meat and other protein foods that are low in fat  Choose beans or other legumes such as split peas or lentils  Choose fish, skinless poultry (chicken or turkey), or lean cuts of red meat (beef or pork)  Before you cook meat or poultry, cut off any visible fat  · Use less fat and oil  Try baking foods instead of frying them  Add less fat, such as margarine, sour cream, regular salad dressing and mayonnaise to foods  Eat fewer high-fat foods  Some examples of high-fat foods include french fries, doughnuts, ice cream, and cakes  · Eat fewer sweets  Limit foods and drinks that are high in sugar  This includes candy, cookies, regular soda, and sweetened drinks  Exercise:  Exercise at least 30 minutes per day on most days of the week  Some examples of exercise include walking, biking, dancing, and swimming  You can also fit in more physical activity by taking the stairs instead of the elevator or parking farther away from stores  Ask your healthcare provider about the best exercise plan for you  © Copyright [employer] 2018 Information is for End User's use only and may not be sold, redistributed or otherwise used for commercial purposes   All illustrations and images included in CareNotes® are the copyrighted property of [employer] , [employer]  or [address] [place] [address]

## 2020-02-26 NOTE — ASSESSMENT & PLAN NOTE
Largely controlled with PPI therapy  Will continue for now  He does have ranitidine to use as needed

## 2020-02-26 NOTE — ASSESSMENT & PLAN NOTE
Has been following with Neurology for years  Previous testing was negative but I am going to have him repeat a SPEP as it has been some years  Will continue with gabapentin

## 2020-02-26 NOTE — PROGRESS NOTES
Assessment/Plan:     1  Essential hypertension  Assessment & Plan:  Would continue on his current regime  It was slightly improved on repeat testing  Orders:  -     Comprehensive metabolic panel  -     CBC and differential  -     Protein electrophoresis, serum; Future    2  Gastroesophageal reflux disease without esophagitis  Assessment & Plan:  Largely controlled with PPI therapy  Will continue for now  He does have ranitidine to use as needed  Orders:  -     omeprazole (PriLOSEC) 20 mg delayed release capsule; Take 1 capsule (20 mg total) by mouth daily    3  Hereditary and idiopathic peripheral neuropathy  Assessment & Plan:  Has been following with Neurology for years  Previous testing was negative but I am going to have him repeat a SPEP as it has been some years  Will continue with gabapentin  Orders:  -     gabapentin (NEURONTIN) 100 mg capsule; Take 1 capsule (100 mg total) by mouth 3 (three) times a day  -     Comprehensive metabolic panel  -     CBC and differential  -     Protein electrophoresis, serum; Future    4  Dyslipidemia  Assessment & Plan:  Remains on statin therapy  5  Medicare annual wellness visit, subsequent  -Reviewed today  6  Chronic gout without tophus, unspecified cause, unspecified site  Assessment & Plan:  Controlled and has been on allopurinol  7  Rosacea  Assessment & Plan:  Remains on gabapentin  Will switch to 100 mg TID and see if this is better  BMI Counseling: Body mass index is 25 44 kg/m²  The BMI is above normal  Nutrition recommendations include 3-5 servings of fruits/vegetables daily  Subjective:      Patient ID: Silvia Mahmood is a 69 y o  male  Jairon Ibrahim is here today to establish care with me/AWV  Chart was reviewed and updated  He is mostly feeling well but does have this chronic idiopathic neuropathy and is maintained on gabapentin   He notes some side effects with this overnight and wondered if he could try splitting the dose to 100 mg TID instead of 300 mg at bedtime  He also has been following with Neurology for years concerning this  Other chart was reviewed and updated  See discussion  The following portions of the patient's history were reviewed and updated as appropriate: He  has a past medical history of Left inguinal hernia, Lyme disease, and Peripheral neuropathy  He   Patient Active Problem List    Diagnosis Date Noted    Dyslipidemia 08/13/2018    Osteoarthritis of spine with radiculopathy, lumbar region 08/13/2018    Essential hypertension 06/30/2017    Prostatitis, chronic 06/30/2017    History of total right hip arthroplasty 06/30/2017    Hereditary and idiopathic peripheral neuropathy 06/30/2017    Nephrolithiasis 06/30/2017    Primary open angle glaucoma 06/30/2017    History of abdominal hernia 06/30/2017    Chronic gout 06/30/2017    Gastroesophageal reflux disease without esophagitis 06/30/2017    Rosacea 06/30/2017    Diverticulosis 06/17/2015     He  has a past surgical history that includes Lithotripsy; Total hip arthroplasty (Right); Colonoscopy; Esophagogastroduodenoscopy; Dupuytren contracture release (Left); Hernia repair (Right); Joint replacement; Hernia repair (Left, 2/9/2017); Inguinal hernia repair (Bilateral, 2011); Dupuytren / palmar fasciotomy; Abdominal surgery; Cataract extraction; Tonsillectomy; and pr colonoscopy flx dx w/collj spec when pfrmd (N/A, 10/31/2018)  His family history includes Cancer in his father; Coronary artery disease in his mother  He  reports that he has never smoked  He has never used smokeless tobacco  He reports that he drinks about 1 0 standard drinks of alcohol per week  He reports that he does not use drugs    Current Outpatient Medications   Medication Sig Dispense Refill    allopurinol (ZYLOPRIM) 300 mg tablet Take 1 tablet (300 mg total) by mouth daily 90 tablet 3    amLODIPine (NORVASC) 10 mg tablet Take 1 tablet (10 mg total) by mouth daily 30 tablet 3    amoxicillin (AMOXIL) 500 mg capsule Take 500 mg by mouth once as needed 4 caps before dental work      atorvastatin (LIPITOR) 10 mg tablet Take 1 tablet (10 mg total) by mouth daily 20 tablet 0    gabapentin (NEURONTIN) 100 mg capsule Take 1 capsule (100 mg total) by mouth 3 (three) times a day 270 capsule 0    hydrochlorothiazide (HYDRODIURIL) 25 mg tablet Take 1 tablet (25 mg total) by mouth daily 90 tablet 3    losartan (COZAAR) 100 MG tablet Take 1 tablet (100 mg total) by mouth daily 90 tablet 3    metroNIDAZOLE (METROGEL) 1 % gel APPLY AT BEDTIME TO AFFECTED SKIN 60 g 2    naproxen (NAPROSYN) 250 mg tablet Take 250 mg by mouth 2 (two) times a day as needed for mild pain      omeprazole (PriLOSEC) 20 mg delayed release capsule Take 1 capsule (20 mg total) by mouth daily      oxymetazoline (AFRIN) 0 05 % nasal spray 2 sprays by Each Nare route 2 (two) times a day      tamsulosin (FLOMAX) 0 4 mg TAKE 1 CAPSULE (0 4 MG TOTAL) BY MOUTH DAILY AT BEDTIME 90 capsule 3    timolol (TIMOPTIC) 0 5 % ophthalmic solution Administer 1 drop to both eyes 2 (two) times a day   3     No current facility-administered medications for this visit  He has No Known Allergies       Review of Systems   Constitutional: Negative for chills, fever and unexpected weight change  Respiratory: Negative for cough, chest tightness and shortness of breath  Cardiovascular: Negative for chest pain  Gastrointestinal: Negative for abdominal pain, diarrhea, nausea and vomiting  Genitourinary: At times notes "prostatitis" symptoms    Musculoskeletal: Negative for arthralgias and myalgias  Neurological: Positive for numbness  Negative for dizziness and headaches  Psychiatric/Behavioral: Negative for dysphoric mood and sleep disturbance  The patient is not nervous/anxious            Objective:      /82   Pulse 78   Temp 98 6 °F (37 °C)   Resp 18   Ht 5' 7" (1 702 m)   Wt 73 7 kg (162 lb 6 4 oz)   SpO2 95%   BMI 25 44 kg/m²          Physical Exam   Constitutional: He is oriented to person, place, and time  He appears well-developed and well-nourished  No distress  HENT:   Head: Normocephalic and atraumatic  Right Ear: External ear normal    Left Ear: External ear normal    Mouth/Throat: Oropharynx is clear and moist    Eyes: Conjunctivae and EOM are normal  Right eye exhibits no discharge  Left eye exhibits no discharge  No scleral icterus  Neck: Normal range of motion  Cardiovascular: Normal rate, regular rhythm and normal heart sounds  No murmur heard  Pulmonary/Chest: Effort normal and breath sounds normal  No respiratory distress  He has no wheezes  Abdominal: Soft  Bowel sounds are normal  There is no tenderness  There is no guarding  Musculoskeletal: Normal range of motion  He exhibits no edema  Lymphadenopathy:     He has no cervical adenopathy  Neurological: He is alert and oriented to person, place, and time  Skin: Skin is warm and dry  He is not diaphoretic  No erythema  Psychiatric: He has a normal mood and affect  His speech is normal and behavior is normal  Judgment and thought content normal    Vitals reviewed

## 2020-03-10 ENCOUNTER — APPOINTMENT (OUTPATIENT)
Dept: LAB | Facility: HOSPITAL | Age: 75
End: 2020-03-10
Attending: INTERNAL MEDICINE
Payer: COMMERCIAL

## 2020-03-10 DIAGNOSIS — G60.9 HEREDITARY AND IDIOPATHIC PERIPHERAL NEUROPATHY: Chronic | ICD-10-CM

## 2020-03-10 DIAGNOSIS — E78.5 DYSLIPIDEMIA: ICD-10-CM

## 2020-03-10 DIAGNOSIS — I10 ESSENTIAL HYPERTENSION: Chronic | ICD-10-CM

## 2020-03-10 LAB
ALBUMIN SERPL BCP-MCNC: 3.9 G/DL (ref 3.5–5)
ALP SERPL-CCNC: 63 U/L (ref 46–116)
ALT SERPL W P-5'-P-CCNC: 24 U/L (ref 12–78)
ANION GAP SERPL CALCULATED.3IONS-SCNC: 7 MMOL/L (ref 4–13)
AST SERPL W P-5'-P-CCNC: 20 U/L (ref 5–45)
BASOPHILS # BLD AUTO: 0.04 THOUSANDS/ΜL (ref 0–0.1)
BASOPHILS NFR BLD AUTO: 1 % (ref 0–1)
BILIRUB SERPL-MCNC: 0.67 MG/DL (ref 0.2–1)
BUN SERPL-MCNC: 20 MG/DL (ref 5–25)
CALCIUM SERPL-MCNC: 9.2 MG/DL (ref 8.3–10.1)
CHLORIDE SERPL-SCNC: 105 MMOL/L (ref 100–108)
CO2 SERPL-SCNC: 30 MMOL/L (ref 21–32)
CREAT SERPL-MCNC: 0.95 MG/DL (ref 0.6–1.3)
EOSINOPHIL # BLD AUTO: 0.16 THOUSAND/ΜL (ref 0–0.61)
EOSINOPHIL NFR BLD AUTO: 4 % (ref 0–6)
ERYTHROCYTE [DISTWIDTH] IN BLOOD BY AUTOMATED COUNT: 14 % (ref 11.6–15.1)
GFR SERPL CREATININE-BSD FRML MDRD: 80 ML/MIN/1.73SQ M
GLUCOSE P FAST SERPL-MCNC: 120 MG/DL (ref 65–99)
HCT VFR BLD AUTO: 52.3 % (ref 36.5–49.3)
HGB BLD-MCNC: 16.6 G/DL (ref 12–17)
IMM GRANULOCYTES # BLD AUTO: 0.02 THOUSAND/UL (ref 0–0.2)
IMM GRANULOCYTES NFR BLD AUTO: 0 % (ref 0–2)
LYMPHOCYTES # BLD AUTO: 1.15 THOUSANDS/ΜL (ref 0.6–4.47)
LYMPHOCYTES NFR BLD AUTO: 25 % (ref 14–44)
MCH RBC QN AUTO: 29.4 PG (ref 26.8–34.3)
MCHC RBC AUTO-ENTMCNC: 31.7 G/DL (ref 31.4–37.4)
MCV RBC AUTO: 93 FL (ref 82–98)
MONOCYTES # BLD AUTO: 0.52 THOUSAND/ΜL (ref 0.17–1.22)
MONOCYTES NFR BLD AUTO: 11 % (ref 4–12)
NEUTROPHILS # BLD AUTO: 2.73 THOUSANDS/ΜL (ref 1.85–7.62)
NEUTS SEG NFR BLD AUTO: 59 % (ref 43–75)
NRBC BLD AUTO-RTO: 0 /100 WBCS
PLATELET # BLD AUTO: 172 THOUSANDS/UL (ref 149–390)
PMV BLD AUTO: 11 FL (ref 8.9–12.7)
POTASSIUM SERPL-SCNC: 3.8 MMOL/L (ref 3.5–5.3)
PROT SERPL-MCNC: 7.2 G/DL (ref 6.4–8.2)
RBC # BLD AUTO: 5.64 MILLION/UL (ref 3.88–5.62)
SODIUM SERPL-SCNC: 142 MMOL/L (ref 136–145)
WBC # BLD AUTO: 4.62 THOUSAND/UL (ref 4.31–10.16)

## 2020-03-10 PROCEDURE — 36415 COLL VENOUS BLD VENIPUNCTURE: CPT

## 2020-03-10 PROCEDURE — 84165 PROTEIN E-PHORESIS SERUM: CPT | Performed by: PATHOLOGY

## 2020-03-10 PROCEDURE — 84165 PROTEIN E-PHORESIS SERUM: CPT

## 2020-03-10 PROCEDURE — 80053 COMPREHEN METABOLIC PANEL: CPT | Performed by: INTERNAL MEDICINE

## 2020-03-10 PROCEDURE — 85025 COMPLETE CBC W/AUTO DIFF WBC: CPT | Performed by: INTERNAL MEDICINE

## 2020-03-10 RX ORDER — ATORVASTATIN CALCIUM 10 MG/1
10 TABLET, FILM COATED ORAL DAILY
Qty: 20 TABLET | Refills: 0 | Status: CANCELLED | OUTPATIENT
Start: 2020-03-10

## 2020-03-11 DIAGNOSIS — E78.5 DYSLIPIDEMIA: ICD-10-CM

## 2020-03-11 LAB
ALBUMIN SERPL ELPH-MCNC: 4.26 G/DL (ref 3.5–5)
ALBUMIN SERPL ELPH-MCNC: 62.7 % (ref 52–65)
ALPHA1 GLOB SERPL ELPH-MCNC: 0.31 G/DL (ref 0.1–0.4)
ALPHA1 GLOB SERPL ELPH-MCNC: 4.5 % (ref 2.5–5)
ALPHA2 GLOB SERPL ELPH-MCNC: 0.67 G/DL (ref 0.4–1.2)
ALPHA2 GLOB SERPL ELPH-MCNC: 9.9 % (ref 7–13)
BETA GLOB ABNORMAL SERPL ELPH-MCNC: 0.41 G/DL (ref 0.4–0.8)
BETA1 GLOB SERPL ELPH-MCNC: 6.1 % (ref 5–13)
BETA2 GLOB SERPL ELPH-MCNC: 5.9 % (ref 2–8)
BETA2+GAMMA GLOB SERPL ELPH-MCNC: 0.4 G/DL (ref 0.2–0.5)
GAMMA GLOB ABNORMAL SERPL ELPH-MCNC: 0.74 G/DL (ref 0.5–1.6)
GAMMA GLOB SERPL ELPH-MCNC: 10.9 % (ref 12–22)
IGG/ALB SER: 1.68 {RATIO} (ref 1.1–1.8)
PROT PATTERN SERPL ELPH-IMP: ABNORMAL
PROT SERPL-MCNC: 6.8 G/DL (ref 6.4–8.2)

## 2020-03-11 NOTE — TELEPHONE ENCOUNTER
Pt said he called Monday for an rx refill on atorvastain 10mg takes 1 tab daily [facility] [address] Sharif Sullivan did not see a phone note on this

## 2020-03-12 RX ORDER — ATORVASTATIN CALCIUM 10 MG/1
10 TABLET, FILM COATED ORAL DAILY
Qty: 30 TABLET | Refills: 0 | Status: SHIPPED | OUTPATIENT
Start: 2020-03-12 | End: 2020-03-17 | Stop reason: SDUPTHER

## 2020-03-17 DIAGNOSIS — E78.5 DYSLIPIDEMIA: ICD-10-CM

## 2020-03-17 RX ORDER — ATORVASTATIN CALCIUM 10 MG/1
10 TABLET, FILM COATED ORAL DAILY
Qty: 90 TABLET | Refills: 3 | Status: SHIPPED | OUTPATIENT
Start: 2020-03-17

## 2020-03-17 NOTE — TELEPHONE ENCOUNTER
Called and spoke to pt about odd 20 tablet request  He is not sure why it came through like that, the only thing he could think of was years ago when the pharmacy couldn't fill his whole 90 day and only gave him 70 so he requested the 20 more tablets  But going forward he appreciates the 90!

## 2020-04-17 DIAGNOSIS — I10 ESSENTIAL HYPERTENSION: ICD-10-CM

## 2020-04-20 DIAGNOSIS — I10 ESSENTIAL HYPERTENSION: ICD-10-CM

## 2020-04-20 RX ORDER — AMLODIPINE BESYLATE 10 MG/1
10 TABLET ORAL DAILY
Qty: 90 TABLET | Refills: 1 | Status: SHIPPED | OUTPATIENT
Start: 2020-04-20 | End: 2020-10-14

## 2020-05-19 DIAGNOSIS — G60.9 HEREDITARY AND IDIOPATHIC PERIPHERAL NEUROPATHY: Chronic | ICD-10-CM

## 2020-05-19 RX ORDER — GABAPENTIN 100 MG/1
CAPSULE ORAL
Qty: 270 CAPSULE | Refills: 0 | Status: SHIPPED | OUTPATIENT
Start: 2020-05-19 | End: 2020-09-21

## 2020-05-20 RX ORDER — AMLODIPINE BESYLATE 10 MG/1
TABLET ORAL
Qty: 90 TABLET | Refills: 1 | OUTPATIENT
Start: 2020-05-20

## 2020-06-04 DIAGNOSIS — L71.9 ROSACEA: ICD-10-CM

## 2020-06-04 RX ORDER — METRONIDAZOLE 10 MG/G
GEL TOPICAL DAILY
Qty: 60 G | Refills: 2 | Status: SHIPPED | OUTPATIENT
Start: 2020-06-04

## 2020-09-18 ENCOUNTER — HOSPITAL ENCOUNTER (EMERGENCY)
Facility: HOSPITAL | Age: 75
Discharge: HOME | End: 2020-09-18
Attending: EMERGENCY MEDICINE | Admitting: EMERGENCY MEDICINE
Payer: COMMERCIAL

## 2020-09-18 ENCOUNTER — TELEPHONE (OUTPATIENT)
Dept: UROLOGY | Facility: MEDICAL CENTER | Age: 75
End: 2020-09-18

## 2020-09-18 ENCOUNTER — APPOINTMENT (EMERGENCY)
Dept: CT IMAGING | Facility: HOSPITAL | Age: 75
End: 2020-09-18
Payer: COMMERCIAL

## 2020-09-18 VITALS
SYSTOLIC BLOOD PRESSURE: 177 MMHG | HEART RATE: 64 BPM | TEMPERATURE: 98.6 F | DIASTOLIC BLOOD PRESSURE: 94 MMHG | OXYGEN SATURATION: 97 % | RESPIRATION RATE: 18 BRPM | WEIGHT: 161.6 LBS | BODY MASS INDEX: 25.31 KG/M2

## 2020-09-18 DIAGNOSIS — R10.9 LEFT FLANK PAIN: Primary | ICD-10-CM

## 2020-09-18 DIAGNOSIS — N20.1 URETEROLITHIASIS: ICD-10-CM

## 2020-09-18 LAB
ALBUMIN SERPL BCP-MCNC: 4.1 G/DL (ref 3.5–5)
ALP SERPL-CCNC: 71 U/L (ref 46–116)
ALT SERPL W P-5'-P-CCNC: 31 U/L (ref 12–78)
ANION GAP SERPL CALCULATED.3IONS-SCNC: 7 MMOL/L (ref 4–13)
AST SERPL W P-5'-P-CCNC: 25 U/L (ref 5–45)
BACTERIA UR QL AUTO: ABNORMAL /HPF
BASOPHILS # BLD AUTO: 0.06 THOUSANDS/ΜL (ref 0–0.1)
BASOPHILS NFR BLD AUTO: 1 % (ref 0–1)
BILIRUB SERPL-MCNC: 0.49 MG/DL (ref 0.2–1)
BILIRUB UR QL STRIP: NEGATIVE
BUN SERPL-MCNC: 22 MG/DL (ref 5–25)
CALCIUM SERPL-MCNC: 9 MG/DL (ref 8.3–10.1)
CHLORIDE SERPL-SCNC: 104 MMOL/L (ref 100–108)
CLARITY UR: CLEAR
CLARITY, POC: CLEAR
CO2 SERPL-SCNC: 30 MMOL/L (ref 21–32)
COLOR UR: YELLOW
COLOR, POC: YELLOW
CREAT SERPL-MCNC: 1.05 MG/DL (ref 0.6–1.3)
EOSINOPHIL # BLD AUTO: 0.1 THOUSAND/ΜL (ref 0–0.61)
EOSINOPHIL NFR BLD AUTO: 1 % (ref 0–6)
ERYTHROCYTE [DISTWIDTH] IN BLOOD BY AUTOMATED COUNT: 13.5 % (ref 11.6–15.1)
GFR SERPL CREATININE-BSD FRML MDRD: 70 ML/MIN/1.73SQ M
GLUCOSE SERPL-MCNC: 124 MG/DL (ref 65–140)
GLUCOSE UR STRIP-MCNC: NEGATIVE MG/DL
HCT VFR BLD AUTO: 51.4 % (ref 36.5–49.3)
HGB BLD-MCNC: 16.7 G/DL (ref 12–17)
HGB UR QL STRIP.AUTO: ABNORMAL
IMM GRANULOCYTES # BLD AUTO: 0.05 THOUSAND/UL (ref 0–0.2)
IMM GRANULOCYTES NFR BLD AUTO: 1 % (ref 0–2)
KETONES UR STRIP-MCNC: NEGATIVE MG/DL
LEUKOCYTE ESTERASE UR QL STRIP: ABNORMAL
LIPASE SERPL-CCNC: 182 U/L (ref 73–393)
LYMPHOCYTES # BLD AUTO: 1.14 THOUSANDS/ΜL (ref 0.6–4.47)
LYMPHOCYTES NFR BLD AUTO: 13 % (ref 14–44)
MCH RBC QN AUTO: 30 PG (ref 26.8–34.3)
MCHC RBC AUTO-ENTMCNC: 32.5 G/DL (ref 31.4–37.4)
MCV RBC AUTO: 92 FL (ref 82–98)
MONOCYTES # BLD AUTO: 0.51 THOUSAND/ΜL (ref 0.17–1.22)
MONOCYTES NFR BLD AUTO: 6 % (ref 4–12)
NEUTROPHILS # BLD AUTO: 6.9 THOUSANDS/ΜL (ref 1.85–7.62)
NEUTS SEG NFR BLD AUTO: 78 % (ref 43–75)
NITRITE UR QL STRIP: NEGATIVE
NON-SQ EPI CELLS URNS QL MICRO: ABNORMAL /HPF
NRBC BLD AUTO-RTO: 0 /100 WBCS
PH UR STRIP.AUTO: 6.5 [PH] (ref 4.5–8)
PLATELET # BLD AUTO: 183 THOUSANDS/UL (ref 149–390)
PMV BLD AUTO: 11 FL (ref 8.9–12.7)
POTASSIUM SERPL-SCNC: 4 MMOL/L (ref 3.5–5.3)
PROT SERPL-MCNC: 7.7 G/DL (ref 6.4–8.2)
PROT UR STRIP-MCNC: ABNORMAL MG/DL
RBC # BLD AUTO: 5.57 MILLION/UL (ref 3.88–5.62)
RBC #/AREA URNS AUTO: ABNORMAL /HPF
SODIUM SERPL-SCNC: 141 MMOL/L (ref 136–145)
SP GR UR STRIP.AUTO: 1.02 (ref 1–1.03)
UROBILINOGEN UR QL STRIP.AUTO: 0.2 E.U./DL
WBC # BLD AUTO: 8.76 THOUSAND/UL (ref 4.31–10.16)
WBC #/AREA URNS AUTO: ABNORMAL /HPF

## 2020-09-18 PROCEDURE — 74176 CT ABD & PELVIS W/O CONTRAST: CPT

## 2020-09-18 PROCEDURE — 81001 URINALYSIS AUTO W/SCOPE: CPT

## 2020-09-18 PROCEDURE — 83690 ASSAY OF LIPASE: CPT | Performed by: EMERGENCY MEDICINE

## 2020-09-18 PROCEDURE — 99284 EMERGENCY DEPT VISIT MOD MDM: CPT

## 2020-09-18 PROCEDURE — 85025 COMPLETE CBC W/AUTO DIFF WBC: CPT | Performed by: EMERGENCY MEDICINE

## 2020-09-18 PROCEDURE — G1004 CDSM NDSC: HCPCS

## 2020-09-18 PROCEDURE — 96361 HYDRATE IV INFUSION ADD-ON: CPT

## 2020-09-18 PROCEDURE — 80053 COMPREHEN METABOLIC PANEL: CPT | Performed by: EMERGENCY MEDICINE

## 2020-09-18 PROCEDURE — 99285 EMERGENCY DEPT VISIT HI MDM: CPT | Performed by: EMERGENCY MEDICINE

## 2020-09-18 PROCEDURE — 96375 TX/PRO/DX INJ NEW DRUG ADDON: CPT

## 2020-09-18 PROCEDURE — 96374 THER/PROPH/DIAG INJ IV PUSH: CPT

## 2020-09-18 PROCEDURE — 36415 COLL VENOUS BLD VENIPUNCTURE: CPT | Performed by: EMERGENCY MEDICINE

## 2020-09-18 RX ORDER — ONDANSETRON 4 MG/1
4 TABLET, ORALLY DISINTEGRATING ORAL EVERY 8 HOURS PRN
Qty: 15 TABLET | Refills: 0 | Status: SHIPPED | OUTPATIENT
Start: 2020-09-18 | End: 2020-10-28 | Stop reason: ALTCHOICE

## 2020-09-18 RX ORDER — ONDANSETRON 2 MG/ML
4 INJECTION INTRAMUSCULAR; INTRAVENOUS ONCE
Status: COMPLETED | OUTPATIENT
Start: 2020-09-18 | End: 2020-09-18

## 2020-09-18 RX ORDER — OXYCODONE HYDROCHLORIDE AND ACETAMINOPHEN 5; 325 MG/1; MG/1
1 TABLET ORAL EVERY 6 HOURS PRN
Qty: 15 TABLET | Refills: 0 | Status: SHIPPED | OUTPATIENT
Start: 2020-09-18 | End: 2020-09-18 | Stop reason: SDUPTHER

## 2020-09-18 RX ORDER — MORPHINE SULFATE 4 MG/ML
4 INJECTION, SOLUTION INTRAMUSCULAR; INTRAVENOUS ONCE
Status: COMPLETED | OUTPATIENT
Start: 2020-09-18 | End: 2020-09-18

## 2020-09-18 RX ORDER — KETOROLAC TROMETHAMINE 30 MG/ML
15 INJECTION, SOLUTION INTRAMUSCULAR; INTRAVENOUS ONCE
Status: COMPLETED | OUTPATIENT
Start: 2020-09-18 | End: 2020-09-18

## 2020-09-18 RX ORDER — OXYCODONE HYDROCHLORIDE AND ACETAMINOPHEN 5; 325 MG/1; MG/1
1 TABLET ORAL EVERY 6 HOURS PRN
Qty: 15 TABLET | Refills: 0 | Status: ON HOLD | OUTPATIENT
Start: 2020-09-18 | End: 2020-09-22 | Stop reason: SDUPTHER

## 2020-09-18 RX ADMIN — SODIUM CHLORIDE 1000 ML: 0.9 INJECTION, SOLUTION INTRAVENOUS at 15:40

## 2020-09-18 RX ADMIN — KETOROLAC TROMETHAMINE 15 MG: 30 INJECTION, SOLUTION INTRAMUSCULAR at 15:43

## 2020-09-18 RX ADMIN — ONDANSETRON 4 MG: 2 INJECTION INTRAMUSCULAR; INTRAVENOUS at 15:42

## 2020-09-18 RX ADMIN — MORPHINE SULFATE 4 MG: 4 INJECTION INTRAVENOUS at 15:45

## 2020-09-18 NOTE — TELEPHONE ENCOUNTER
Patient seen in [facility] to report he is going to the ER  Experiencing kidney stone pain  Positive for vomiting    Thank you

## 2020-09-18 NOTE — TELEPHONE ENCOUNTER
Attempted to call patient at this time, no answer left message with office number for call back    Office will monitor for notes from ER

## 2020-09-18 NOTE — ED PROVIDER NOTES
History  Chief Complaint   Patient presents with    Flank Pain     Left sided flank pain starting today  Hx stones  Vomiting  Denies hematuria  Denies taking anything for pain PTA       History provided by:  Patient   used: No    Flank Pain   Pain location:  L flank  Pain quality: aching    Pain radiates to:  LLQ  Pain severity:  Moderate  Onset quality:  Gradual  Duration:  3 hours  Timing:  Constant  Progression:  Unchanged  Chronicity:  Recurrent  Context comment:  Hx of Kidney stones  Relieved by:  Nothing  Worsened by:  Nothing  Ineffective treatments:  None tried  Associated symptoms: no chest pain, no chills, no cough, no diarrhea, no dysuria, no fever, no nausea, no shortness of breath, no sore throat and no vomiting    Risk factors comment:  Hx of Kidney stones      Prior to Admission Medications   Prescriptions Last Dose Informant Patient Reported?  Taking?   allopurinol (ZYLOPRIM) 300 mg tablet  Self No Yes   Sig: Take 1 tablet (300 mg total) by mouth daily   amLODIPine (NORVASC) 10 mg tablet   No Yes   Sig: Take 1 tablet (10 mg total) by mouth daily   amoxicillin (AMOXIL) 500 mg capsule Not Taking at Unknown time Self Yes No   Sig: Take 500 mg by mouth once as needed 4 caps before dental work   atorvastatin (LIPITOR) 10 mg tablet   No Yes   Sig: Take 1 tablet (10 mg total) by mouth daily   gabapentin (NEURONTIN) 100 mg capsule   No Yes   Sig: TAKE 1 CAPSULE BY MOUTH THREE TIMES A DAY   hydrochlorothiazide (HYDRODIURIL) 25 mg tablet  Self No Yes   Sig: Take 1 tablet (25 mg total) by mouth daily   losartan (COZAAR) 100 MG tablet  at Unknown time Self No Yes   Sig: Take 1 tablet (100 mg total) by mouth daily   metroNIDAZOLE (METROGEL) 1 % gel   No Yes   Sig: Apply topically daily   naproxen (NAPROSYN) 250 mg tablet  Self Yes Yes   Sig: Take 250 mg by mouth 2 (two) times a day as needed for mild pain   omeprazole (PriLOSEC) 20 mg delayed release capsule   No Yes   Sig: Take 1 capsule (20 mg total) by mouth daily   oxymetazoline (AFRIN) 0 05 % nasal spray  Self Yes Yes   Si sprays by Each Nare route 2 (two) times a day   tamsulosin (FLOMAX) 0 4 mg  Self No Yes   Sig: TAKE 1 CAPSULE (0 4 MG TOTAL) BY MOUTH DAILY AT BEDTIME   timolol (TIMOPTIC) 0 5 % ophthalmic solution  Self Yes Yes   Sig: Administer 1 drop to both eyes 2 (two) times a day       Facility-Administered Medications: None       Past Medical History:   Diagnosis Date    Left inguinal hernia     Lyme disease     last assessed 7/10/2017    Peripheral neuropathy        Past Surgical History:   Procedure Laterality Date    ABDOMINAL SURGERY      CATARACT EXTRACTION      COLONOSCOPY      last assessed 2015    DUPUYTREN / PALMAR FASCIOTOMY      last assessed 2015    DUPUYTREN CONTRACTURE RELEASE Left     ESOPHAGOGASTRODUODENOSCOPY      HERNIA REPAIR Right     inguinal    HERNIA REPAIR Left 2017    Procedure: REPAIR HERNIA INGUINAL, LAPAROSCOPIC WITH MESH;  Surgeon: Mich Garcia MD;  Location: [facility] OR;  Service:    [facility] Bilateral 2011    with mesh done at [facility]      hip replacement    LITHOTRIPSY      [place] COLONOSCOPY FLX DX W/COLLGARY Kamara 1978 PFRMD N/A 10/31/2018    Procedure: EGD AND COLONOSCOPY;  Surgeon: Viktor Fishman MD;  Location: [facility]; Service: Gastroenterology    TONSILLECTOMY      TOTAL HIP ARTHROPLASTY Right     last assessed 2014       Family History   Problem Relation Age of Onset    Coronary artery disease Mother     Cancer Father      I have reviewed and agree with the history as documented  E-Cigarette/Vaping    E-Cigarette Use Never User      E-Cigarette/Vaping Substances     Social History     Tobacco Use    Smoking status: Never Smoker    Smokeless tobacco: Never Used    Tobacco comment: per allscripts ' former smoker / never a smoker '   Substance Use Topics    Alcohol use:  Yes     Alcohol/week: 1 0 standard drinks     Types: 1 Glasses of wine per week     Comment: daily with dinner/occas beer    Drug use: No       Review of Systems   Constitutional: Negative for chills and fever  HENT: Negative for facial swelling, sore throat and trouble swallowing  Eyes: Negative for pain and visual disturbance  Respiratory: Negative for cough, chest tightness and shortness of breath  Cardiovascular: Negative for chest pain and leg swelling  Gastrointestinal: Negative for abdominal pain, diarrhea, nausea and vomiting  Genitourinary: Positive for flank pain  Negative for dysuria  Musculoskeletal: Negative for back pain, neck pain and neck stiffness  Skin: Negative for pallor and rash  Allergic/Immunologic: Negative for environmental allergies and immunocompromised state  Neurological: Negative for dizziness and headaches  Hematological: Negative for adenopathy  Does not bruise/bleed easily  Psychiatric/Behavioral: Negative for agitation and behavioral problems  All other systems reviewed and are negative  Physical Exam  Physical Exam  Vitals signs and nursing note reviewed  Constitutional:       General: He is not in acute distress  Appearance: He is well-developed  HENT:      Head: Normocephalic and atraumatic  Neck:      Musculoskeletal: Normal range of motion and neck supple  Cardiovascular:      Rate and Rhythm: Normal rate and regular rhythm  Pulmonary:      Effort: Pulmonary effort is normal    Abdominal:      Palpations: Abdomen is soft  Tenderness: There is no abdominal tenderness  There is no guarding or rebound  Musculoskeletal: Normal range of motion  Skin:     General: Skin is warm and dry  Neurological:      Mental Status: He is alert and oriented to person, place, and time           Vital Signs  ED Triage Vitals [09/18/20 1513]   Temperature Pulse Respirations Blood Pressure SpO2   98 6 °F (37 °C) 64 18 (!) 177/94 97 %      Temp Source Heart Rate Source Patient Position - Orthostatic VS BP Location FiO2 (%)   Temporal Monitor Sitting Right arm --      Pain Score       8           Vitals:    09/18/20 1513   BP: (!) 177/94   Pulse: 64   Patient Position - Orthostatic VS: Sitting         Visual Acuity      ED Medications  Medications   sodium chloride 0 9 % bolus 1,000 mL (0 mL Intravenous Stopped 9/18/20 1654)   ketorolac (TORADOL) injection 15 mg (15 mg Intravenous Given 9/18/20 1543)   morphine (PF) 4 mg/mL injection 4 mg (4 mg Intravenous Given 9/18/20 1545)   ondansetron (ZOFRAN) injection 4 mg (4 mg Intravenous Given 9/18/20 1542)       Diagnostic Studies  Results Reviewed     Procedure Component Value Units Date/Time    Urine Microscopic [[id number]]  (Abnormal) Collected:  09/18/20 1534    Lab Status:  Final result Specimen:  Urine, Other Updated:  09/18/20 1616     RBC, UA 4-10 /hpf      WBC, UA 2-4 /hpf      Epithelial Cells Occasional /hpf      Bacteria, UA Occasional /hpf     Comprehensive metabolic panel [[id number]] Collected:  09/18/20 1538    Lab Status:  Final result Specimen:  Blood from Arm, Left Updated:  09/18/20 1608     Sodium 141 mmol/L      Potassium 4 0 mmol/L      Chloride 104 mmol/L      CO2 30 mmol/L      ANION GAP 7 mmol/L      BUN 22 mg/dL      Creatinine 1 05 mg/dL      Glucose 124 mg/dL      Calcium 9 0 mg/dL      AST 25 U/L      ALT 31 U/L      Alkaline Phosphatase 71 U/L      Total Protein 7 7 g/dL      Albumin 4 1 g/dL      Total Bilirubin 0 49 mg/dL      eGFR 70 ml/min/1 73sq m     Narrative:       Vita guidelines for Chronic Kidney Disease (CKD):     Stage 1 with normal or high GFR (GFR > 90 mL/min/1 73 square meters)    Stage 2 Mild CKD (GFR = 60-89 mL/min/1 73 square meters)    Stage 3A Moderate CKD (GFR = 45-59 mL/min/1 73 square meters)    Stage 3B Moderate CKD (GFR = 30-44 mL/min/1 73 square meters)    Stage 4 Severe CKD (GFR = 15-29 mL/min/1 73 square meters)    Stage 5 End Stage CKD (GFR <15 mL/min/1 73 square meters)  Note: GFR calculation is accurate only with a steady state creatinine    Lipase [[id number]]  (Normal) Collected:  09/18/20 1538    Lab Status:  Final result Specimen:  Blood from Arm, Left Updated:  09/18/20 1608     Lipase 182 u/L     POCT urinalysis dipstick [[id number]]  (Abnormal) Resulted:  09/18/20 1552    Lab Status:  Final result Specimen:  Urine Updated:  09/18/20 1552     Color, UA yellow     Clarity, UA clear    CBC and differential [[id number]]  (Abnormal) Collected:  09/18/20 1538    Lab Status:  Final result Specimen:  Blood from Arm, Left Updated:  09/18/20 1545     WBC 8 76 Thousand/uL      RBC 5 57 Million/uL      Hemoglobin 16 7 g/dL      Hematocrit 51 4 %      MCV 92 fL      MCH 30 0 pg      MCHC 32 5 g/dL      RDW 13 5 %      MPV 11 0 fL      Platelets 527 Thousands/uL      nRBC 0 /100 WBCs      Neutrophils Relative 78 %      Immat GRANS % 1 %      Lymphocytes Relative 13 %      Monocytes Relative 6 %      Eosinophils Relative 1 %      Basophils Relative 1 %      Neutrophils Absolute 6 90 Thousands/µL      Immature Grans Absolute 0 05 Thousand/uL      Lymphocytes Absolute 1 14 Thousands/µL      Monocytes Absolute 0 51 Thousand/µL      Eosinophils Absolute 0 10 Thousand/µL      Basophils Absolute 0 06 Thousands/µL     Urine Macroscopic, POC [[id number]]  (Abnormal) Collected:  09/18/20 1534    Lab Status:  Final result Specimen:  Urine Updated:  09/18/20 1536     Color, UA Yellow     Clarity, UA Clear     pH, UA 6 5     Leukocytes, UA Trace     Nitrite, UA Negative     Protein,  (2+) mg/dl      Glucose, UA Negative mg/dl      Ketones, UA Negative mg/dl      Urobilinogen, UA 0 2 E U /dl      Bilirubin, UA Negative     Blood, UA Moderate     Specific Gravity, UA 1 025    Narrative:       CLINITEK RESULT                 CT renal stone study abdomen pelvis without contrast   Final Result by Brock Almanza DO (09/18 1636)      Mild to moderate left hydroureteronephrosis due to a 7 mm obstructing UPJ calculus  Nonobstructing bilateral renal calculi  Diffuse colonic diverticulosis  Prostatomegaly  Limited study without IV or oral contrast       Workstation performed: [id number]                    Procedures  Procedures         ED Course  ED Course as of Sep 19 0919   Fri Sep 18, 2020   1537 Leukocytes, UA(!): Trace   1537 Nitrite, UA: Negative   1537 Urine dip noted  Blood, UA(!): Moderate   1548 WBC: 8 76   1548 Hemoglobin: 16 7   1548 CBC noted  Platelet Count: 494   1608 Sodium: 141   1609 Potassium: 4 0   1609 BUN: 22   1609 Creatinine: 1 05   1609 eGFR: 70   1609 CMP noted  Lipase: 182   1641 CT scan results reviewed:    IMPRESSION:     Mild to moderate left hydroureteronephrosis due to a 7 mm obstructing UPJ calculus       Nonobstructing bilateral renal calculi      Diffuse colonic diverticulosis      Prostatomegaly      1700 Patient re-evaluated informed about the workup results including labs and CT scan, 7 mm left UPJ stone  Pain reasonably controlled after pain meds, we will p o  Trial and consult Urology  1722 Case discussed with Dr Odalis Montenegro, Urology, advised preferred option would be to admit patient so the stone can be taken care of       1736 Patient informed about the Urologist recommendation, patient has several questions  Tried to answer the questions, informed that if he is agreeable for admission, he will be kept NPO after MN in anticipation for possible procedure tomorrow, he will get IVF, pain meds PRN; however patient demanding specific information on: What kind of procedure will be done? How long is the expected stay? He is requesting to speak to Urology directly  Urology paged again via [employer]  0 Dr Nilam Beauchamp, Urologist contacted in [address], talked to patient on phone  Patient would like to go home; we will give pain meds and Zofran; RTED instructions discussed, otherwise follow up with Urology on Monday  MDM  Number of Diagnoses or Management Options  Left flank pain: new and requires workup  Ureterolithiasis:   Diagnosis management comments: Patient is a 59-year-old male, history of kidney stones, comes in with left flank pain, started around 12 noon, radiating to left lower quadrant, denies fever, chills, on exam, no acute distress, patient does however appear uncomfortable due to pain, vital signs noted; abdomen is soft, no anterior abdominal tenderness, no left CVA tenderness  Differential diagnosis:  Ureteric stone, UTI, pyelonephritis, will check labs, urine, get CT scan renal stone study, give pain meds, IV fluids  Amount and/or Complexity of Data Reviewed  Clinical lab tests: ordered and reviewed  Tests in the radiology section of CPT®: ordered and reviewed  Tests in the medicine section of CPT®: reviewed and ordered  Discuss the patient with other providers: yes  Independent visualization of images, tracings, or specimens: yes        Disposition  Final diagnoses:   Left flank pain   Ureterolithiasis     Time reflects when diagnosis was documented in both MDM as applicable and the Disposition within this note     Time User Action Codes Description Comment    9/18/2020  3:42 PM Karen Darting Add [R10 9] Left flank pain     9/18/2020  6:26 PM Karen Darting Add [N20 1] Ureterolithiasis       ED Disposition     ED Disposition Condition Date/Time Comment    Discharge Stable Fri Sep 18, 2020  6:26 PM aCthie Cortez discharge to home/self care              Follow-up Information     Follow up With Specialties Details Why Yuly Vogel MD Urology Call in 2 days Folow up on Monday [id number] [phone]            Discharge Medication List as of 9/18/2020  6:30 PM      START taking these medications    Details   ondansetron (ZOFRAN-ODT) 4 mg disintegrating tablet Take 1 tablet (4 mg total) by mouth every 8 (eight) hours as needed for nausea or vomiting for up to 5 days, Starting Fri 9/18/2020, Until Wed 9/23/2020, Normal         CONTINUE these medications which have CHANGED    Details   oxyCODONE-acetaminophen (PERCOCET) 5-325 mg per tablet Take 1 tablet by mouth every 6 (six) hours as needed for moderate pain for up to 4 daysMax Daily Amount: 4 tablets, Starting Fri 9/18/2020, Until Tue 9/22/2020, Normal         CONTINUE these medications which have NOT CHANGED    Details   allopurinol (ZYLOPRIM) 300 mg tablet Take 1 tablet (300 mg total) by mouth daily, Starting Wed 1/8/2020, Normal      amLODIPine (NORVASC) 10 mg tablet Take 1 tablet (10 mg total) by mouth daily, Starting Mon 4/20/2020, Normal      atorvastatin (LIPITOR) 10 mg tablet Take 1 tablet (10 mg total) by mouth daily, Starting Tue 3/17/2020, Normal      gabapentin (NEURONTIN) 100 mg capsule TAKE 1 CAPSULE BY MOUTH THREE TIMES A DAY, Normal      hydrochlorothiazide (HYDRODIURIL) 25 mg tablet Take 1 tablet (25 mg total) by mouth daily, Starting Thu 10/24/2019, Normal      losartan (COZAAR) 100 MG tablet Take 1 tablet (100 mg total) by mouth daily, Starting Wed 11/13/2019, No Print      metroNIDAZOLE (METROGEL) 1 % gel Apply topically daily, Starting Thu 6/4/2020, Normal      naproxen (NAPROSYN) 250 mg tablet Take 250 mg by mouth 2 (two) times a day as needed for mild pain, Historical Med      omeprazole (PriLOSEC) 20 mg delayed release capsule Take 1 capsule (20 mg total) by mouth daily, Starting Wed 2/26/2020, No Print      oxymetazoline (AFRIN) 0 05 % nasal spray 2 sprays by Each Nare route 2 (two) times a day, Historical Med      tamsulosin (FLOMAX) 0 4 mg TAKE 1 CAPSULE (0 4 MG TOTAL) BY MOUTH DAILY AT BEDTIME, Starting Mon 10/7/2019, Normal      timolol (TIMOPTIC) 0 5 % ophthalmic solution Administer 1 drop to both eyes 2 (two) times a day , Starting Fri 6/29/2018, Historical Med      amoxicillin (AMOXIL) 500 mg capsule Take 500 mg by mouth once as needed 4 caps before dental work, Historical Med           No discharge procedures on file      PDMP Review     None          ED Provider  Electronically Signed by           Jessica Jewell MD  09/19/20 5228

## 2020-09-21 ENCOUNTER — PREP FOR PROCEDURE (OUTPATIENT)
Dept: UROLOGY | Facility: MEDICAL CENTER | Age: 75
End: 2020-09-21

## 2020-09-21 ENCOUNTER — ANESTHESIA EVENT (OUTPATIENT)
Dept: PERIOP | Facility: HOSPITAL | Age: 75
End: 2020-09-21
Payer: COMMERCIAL

## 2020-09-21 DIAGNOSIS — N20.0 RENAL CALCULUS: Primary | ICD-10-CM

## 2020-09-21 PROCEDURE — NC001 PR NO CHARGE: Performed by: UROLOGY

## 2020-09-21 RX ORDER — GABAPENTIN 300 MG/1
300 CAPSULE ORAL
COMMUNITY

## 2020-09-21 NOTE — ANESTHESIA PREPROCEDURE EVALUATION
Procedure:  CYSTOSCOPY URETEROSCOPY WITH LITHOTRIPSY HOLMIUM LASER, RETROGRADE PYELOGRAM AND INSERTION STENT URETERAL (Left Bladder)    Relevant Problems   CARDIO   (+) Essential hypertension   (-) Chest pain      GI/HEPATIC   (+) Gastroesophageal reflux disease without esophagitis (controlled)      /RENAL   (+) Nephrolithiasis (7m left UPJ   multiple small stone on the right side)   (+) Prostatitis, chronic      MUSCULOSKELETAL   (+) Chronic gout      NEURO/PSYCH   (+) History of abdominal hernia        Physical Exam    Airway    Mallampati score: II  TM Distance: >3 FB  Neck ROM: full     Dental   No notable dental hx     Cardiovascular  Cardiovascular exam normal    Pulmonary  Pulmonary exam normal     Other Findings        Anesthesia Plan  ASA Score- 2     Anesthesia Type- general with ASA Monitors  Additional Monitors:   Airway Plan: ETT  Plan Factors-    Chart reviewed  Induction- intravenous  Postoperative Plan-     Informed Consent- Anesthetic plan and risks discussed with patient  I personally reviewed this patient with the CRNA  Discussed and agreed on the Anesthesia Plan with the NUHA Valles

## 2020-09-21 NOTE — H&P
HISTORY AND PHYSICAL  ? ? Patient Name: Ivone Vickers  Patient MRN: [id number]  Attending Provider: Giselle Jay MD  Service: Urology  Chief Complaint    7 mm left renal or UPJ stone    HPI   Ivone Vickers is a 70 y o  male with recent flank pain, CT scan showing above stone on CT  Several other smaller stones in both kidneys  I plan cysto, left ureteroscopy, laser stones, stent  Potential risks and complications discussed, and informed consent was given by the patient  Medications  Meds/Allergies   No current facility-administered medications for this encounter  Cannot display prior to admission medications because the patient has not been admitted in this contact  No current facility-administered medications for this encounter       Current Outpatient Medications:     allopurinol (ZYLOPRIM) 300 mg tablet, Take 1 tablet (300 mg total) by mouth daily, Disp: 90 tablet, Rfl: 3    amLODIPine (NORVASC) 10 mg tablet, Take 1 tablet (10 mg total) by mouth daily, Disp: 90 tablet, Rfl: 1    atorvastatin (LIPITOR) 10 mg tablet, Take 1 tablet (10 mg total) by mouth daily, Disp: 90 tablet, Rfl: 3    gabapentin (NEURONTIN) 300 mg capsule, Take 300 mg by mouth daily at bedtime, Disp: , Rfl:     hydrochlorothiazide (HYDRODIURIL) 25 mg tablet, Take 1 tablet (25 mg total) by mouth daily, Disp: 90 tablet, Rfl: 3    losartan (COZAAR) 100 MG tablet, Take 1 tablet (100 mg total) by mouth daily, Disp: 90 tablet, Rfl: 3    metroNIDAZOLE (METROGEL) 1 % gel, Apply topically daily, Disp: 60 g, Rfl: 2    naproxen (NAPROSYN) 250 mg tablet, Take 250 mg by mouth 2 (two) times a day as needed for mild pain, Disp: , Rfl:     omeprazole (PriLOSEC) 20 mg delayed release capsule, Take 1 capsule (20 mg total) by mouth daily, Disp: , Rfl:     oxymetazoline (AFRIN) 0 05 % nasal spray, 2 sprays by Each Nare route 2 (two) times a day, Disp: , Rfl:     tamsulosin (FLOMAX) 0 4 mg, TAKE 1 CAPSULE (0 4 MG TOTAL) BY MOUTH DAILY AT BEDTIME, Disp: 90 capsule, Rfl: 3    timolol (TIMOPTIC) 0 5 % ophthalmic solution, Administer 1 drop to both eyes 2 (two) times a day , Disp: , Rfl: 3    amoxicillin (AMOXIL) 500 mg capsule, Take 500 mg by mouth once as needed 4 caps before dental work, Disp: , Rfl:     ondansetron (ZOFRAN-ODT) 4 mg disintegrating tablet, Take 1 tablet (4 mg total) by mouth every 8 (eight) hours as needed for nausea or vomiting for up to 5 days, Disp: 15 tablet, Rfl: 0    oxyCODONE-acetaminophen (PERCOCET) 5-325 mg per tablet, Take 1 tablet by mouth every 6 (six) hours as needed for moderate pain for up to 4 daysMax Daily Amount: 4 tablets, Disp: 15 tablet, Rfl: 0  Review of Systems  10 point review of systems negative except as noted in HPI  Allergies  No Known Allergies  PMH  Past Medical History:   Diagnosis Date    Hyperlipidemia     Hypertension     Left inguinal hernia     Lyme disease     last assessed 7/10/2017    Peripheral neuropathy      Past surgical history  Past Surgical History:   Procedure Laterality Date    ABDOMINAL SURGERY      CATARACT EXTRACTION      COLONOSCOPY      last assessed 6/17/2015    DUPUYTREN / PALMAR FASCIOTOMY      last assessed 6/17/2015    DUPUYTREN CONTRACTURE RELEASE Left     ESOPHAGOGASTRODUODENOSCOPY      HERNIA REPAIR Right     inguinal    HERNIA REPAIR Left 2/9/2017    Procedure: REPAIR HERNIA INGUINAL, LAPAROSCOPIC WITH MESH;  Surgeon: Ce Palm MD;  Location: [facility] OR;  Service:    [facility] Bilateral 2011    with mesh done at [facility]      hip replacement    LITHOTRIPSY      [place] COLONOSCOPY FLX DX W/JUSTIN Kamara 1978 PFRMD N/A 10/31/2018    Procedure: EGD AND COLONOSCOPY;  Surgeon: Shiela Burroughs MD;  Location: [facility] GI LAB;   Service: Gastroenterology    TONSILLECTOMY      TOTAL HIP ARTHROPLASTY Right     last assessed 12/5/2014     Social history  Social History     Tobacco Use    Smoking status: Never Smoker  Smokeless tobacco: Never Used    Tobacco comment: per allscripts ' former smoker / never a smoker '   Substance Use Topics    Alcohol use: Yes     Alcohol/week: 7 0 standard drinks     Types: 7 Glasses of wine per week     Frequency: 4 or more times a week     Drinks per session: 1 or 2     Binge frequency: Never    Drug use: No     ?  Physical Exam     vs  General appearance: alert and oriented, in no acute distress  Head: Normocephalic, without obvious abnormality, atraumatic  Eyes: conjunctivae/corneas clear  PERRL, EOM's intact  Fundi benign  Throat: lips, mucosa, and tongue normal; teeth and gums normal  Neck: no adenopathy, no carotid bruit, no JVD, supple, symmetrical, trachea midline and thyroid not enlarged, symmetric, no tenderness/mass/nodules  Back: symmetric, no curvature  ROM normal  No CVA tenderness    Lungs: clear to auscultation bilaterally  Heart: regular rate and rhythm, S1, S2 normal, no murmur, click, rub or gallop  Abdomen: soft, non-tender; bowel sounds normal; no masses,  no organomegaly  Extremities: extremities normal, warm and well-perfused; no cyanosis, clubbing, or edema  Neurologic: Grossly normal  Janessa Rivera MD

## 2020-09-21 NOTE — TELEPHONE ENCOUNTER
Called and spoke with patient at this time  He reports he is surprisingly not in any pain right now  He reports he knows he did not pass the kidney stone however  He states he already spoke with Antionette Ulloa RN from Dr Mary Anne Deras office  She was to get back to him after consulting with Dr Mireya Brink about an appointment today  Advised I did not realize someone had called him back  He reports he would like to continue with Dr Mireya Brink as he did speak with him while he was in the ER  Advised I would connect with his office and we will get him set up with an appointment soon  Patient verbalized understanding

## 2020-09-21 NOTE — PRE-PROCEDURE INSTRUCTIONS
Pre-Surgery Instructions:   Medication Instructions    allopurinol (ZYLOPRIM) 300 mg tablet Instructed patient per Anesthesia Guidelines   amLODIPine (NORVASC) 10 mg tablet Instructed patient per Anesthesia Guidelines   atorvastatin (LIPITOR) 10 mg tablet Instructed patient per Anesthesia Guidelines   gabapentin (NEURONTIN) 300 mg capsule Instructed patient per Anesthesia Guidelines   hydrochlorothiazide (HYDRODIURIL) 25 mg tablet Instructed patient per Anesthesia Guidelines   losartan (COZAAR) 100 MG tablet Instructed patient per Anesthesia Guidelines   metroNIDAZOLE (METROGEL) 1 % gel Instructed patient per Anesthesia Guidelines   naproxen (NAPROSYN) 250 mg tablet Instructed patient per Anesthesia Guidelines   omeprazole (PriLOSEC) 20 mg delayed release capsule Instructed patient per Anesthesia Guidelines   oxymetazoline (AFRIN) 0 05 % nasal spray Instructed patient per Anesthesia Guidelines   tamsulosin (FLOMAX) 0 4 mg Instructed patient per Anesthesia Guidelines   timolol (TIMOPTIC) 0 5 % ophthalmic solution Instructed patient per Anesthesia Guidelines

## 2020-09-21 NOTE — PRE-PROCEDURE INSTRUCTIONS
Pre-Surgery Instructions:   Medication Instructions    allopurinol (ZYLOPRIM) 300 mg tablet Instructed patient per Anesthesia Guidelines   amLODIPine (NORVASC) 10 mg tablet Instructed patient per Anesthesia Guidelines   atorvastatin (LIPITOR) 10 mg tablet Instructed patient per Anesthesia Guidelines   gabapentin (NEURONTIN) 300 mg capsule Instructed patient per Anesthesia Guidelines   hydrochlorothiazide (HYDRODIURIL) 25 mg tablet Instructed patient per Anesthesia Guidelines   losartan (COZAAR) 100 MG tablet Instructed patient per Anesthesia Guidelines   metroNIDAZOLE (METROGEL) 1 % gel Instructed patient per Anesthesia Guidelines   naproxen (NAPROSYN) 250 mg tablet Instructed patient per Anesthesia Guidelines   omeprazole (PriLOSEC) 20 mg delayed release capsule Instructed patient per Anesthesia Guidelines   oxymetazoline (AFRIN) 0 05 % nasal spray Instructed patient per Anesthesia Guidelines   tamsulosin (FLOMAX) 0 4 mg Instructed patient per Anesthesia Guidelines   timolol (TIMOPTIC) 0 5 % ophthalmic solution Instructed patient per Anesthesia Guidelines  Pt instructed to NOT take losartan and hctz on day of surgery  Pt verbalized understanding of shower instructions

## 2020-09-21 NOTE — TELEPHONE ENCOUNTER
Patient to [facility] in [place] ER 9/18/20  CT showed:  Mild to moderate left hydroureteronephrosis due to a 7 mm obstructing UPJ calculus  Dr Deanne Kennedy recommended admission  Patient declined and was d/c home with pain medication and zofran  Please advise on appointment time frame

## 2020-09-21 NOTE — TELEPHONE ENCOUNTER
Pt calling stated he presented to [facility] ([facility] ED 09/18/20 they contacted Dr Villanueva he was instructed to call office office first thing Mon morning,please review and contact pt directly

## 2020-09-21 NOTE — TELEPHONE ENCOUNTER
I spoke to the patient and confirmed he is scheduled for his Ureteroscopy, Laser, Stent with Dr Mila Ellis tomorrow 9/22/2020 at [facility]      -instructions given verbally  -HBS- no auth required

## 2020-09-22 ENCOUNTER — NURSE TRIAGE (OUTPATIENT)
Dept: OTHER | Facility: OTHER | Age: 75
End: 2020-09-22

## 2020-09-22 ENCOUNTER — ANESTHESIA (OUTPATIENT)
Dept: PERIOP | Facility: HOSPITAL | Age: 75
End: 2020-09-22
Payer: COMMERCIAL

## 2020-09-22 ENCOUNTER — HOSPITAL ENCOUNTER (EMERGENCY)
Facility: HOSPITAL | Age: 75
Discharge: HOME | End: 2020-09-23
Attending: EMERGENCY MEDICINE | Admitting: EMERGENCY MEDICINE
Payer: COMMERCIAL

## 2020-09-22 ENCOUNTER — HOSPITAL ENCOUNTER (OUTPATIENT)
Facility: HOSPITAL | Age: 75
Setting detail: OUTPATIENT SURGERY
Discharge: HOME | End: 2020-09-22
Attending: UROLOGY | Admitting: UROLOGY
Payer: COMMERCIAL

## 2020-09-22 ENCOUNTER — APPOINTMENT (OUTPATIENT)
Dept: RADIOLOGY | Facility: HOSPITAL | Age: 75
End: 2020-09-22
Payer: COMMERCIAL

## 2020-09-22 VITALS
HEART RATE: 92 BPM | OXYGEN SATURATION: 92 % | RESPIRATION RATE: 16 BRPM | SYSTOLIC BLOOD PRESSURE: 153 MMHG | BODY MASS INDEX: 25.41 KG/M2 | TEMPERATURE: 99 F | DIASTOLIC BLOOD PRESSURE: 90 MMHG | WEIGHT: 162.26 LBS

## 2020-09-22 VITALS
DIASTOLIC BLOOD PRESSURE: 85 MMHG | RESPIRATION RATE: 16 BRPM | HEART RATE: 52 BPM | HEIGHT: 67 IN | BODY MASS INDEX: 25.27 KG/M2 | OXYGEN SATURATION: 92 % | TEMPERATURE: 97 F | SYSTOLIC BLOOD PRESSURE: 154 MMHG | WEIGHT: 161 LBS

## 2020-09-22 VITALS — HEART RATE: 70 BPM

## 2020-09-22 DIAGNOSIS — R33.9 URINARY RETENTION: Primary | ICD-10-CM

## 2020-09-22 DIAGNOSIS — N20.1 URETEROLITHIASIS: ICD-10-CM

## 2020-09-22 DIAGNOSIS — R10.9 LEFT FLANK PAIN: ICD-10-CM

## 2020-09-22 DIAGNOSIS — R31.9 HEMATURIA: ICD-10-CM

## 2020-09-22 PROCEDURE — 99285 EMERGENCY DEPT VISIT HI MDM: CPT | Performed by: EMERGENCY MEDICINE

## 2020-09-22 PROCEDURE — 96361 HYDRATE IV INFUSION ADD-ON: CPT

## 2020-09-22 PROCEDURE — C2617 STENT, NON-COR, TEM W/O DEL: HCPCS | Performed by: UROLOGY

## 2020-09-22 PROCEDURE — 99284 EMERGENCY DEPT VISIT MOD MDM: CPT

## 2020-09-22 PROCEDURE — 52356 CYSTO/URETERO W/LITHOTRIPSY: CPT | Performed by: UROLOGY

## 2020-09-22 PROCEDURE — C1894 INTRO/SHEATH, NON-LASER: HCPCS | Performed by: UROLOGY

## 2020-09-22 PROCEDURE — 96375 TX/PRO/DX INJ NEW DRUG ADDON: CPT

## 2020-09-22 PROCEDURE — 96374 THER/PROPH/DIAG INJ IV PUSH: CPT

## 2020-09-22 PROCEDURE — C1769 GUIDE WIRE: HCPCS | Performed by: UROLOGY

## 2020-09-22 PROCEDURE — 74420 UROGRAPHY RTRGR +-KUB: CPT

## 2020-09-22 DEVICE — STENT CONTOUR INJ 6FR 30CM: Type: IMPLANTABLE DEVICE | Site: URETER | Status: FUNCTIONAL

## 2020-09-22 RX ORDER — HYDROMORPHONE HCL/PF 1 MG/ML
0.5 SYRINGE (ML) INJECTION
Status: DISCONTINUED | OUTPATIENT
Start: 2020-09-22 | End: 2020-09-22 | Stop reason: HOSPADM

## 2020-09-22 RX ORDER — DEXAMETHASONE SODIUM PHOSPHATE 4 MG/ML
INJECTION, SOLUTION INTRA-ARTICULAR; INTRALESIONAL; INTRAMUSCULAR; INTRAVENOUS; SOFT TISSUE AS NEEDED
Status: DISCONTINUED | OUTPATIENT
Start: 2020-09-22 | End: 2020-09-22

## 2020-09-22 RX ORDER — PROPOFOL 10 MG/ML
INJECTION, EMULSION INTRAVENOUS AS NEEDED
Status: DISCONTINUED | OUTPATIENT
Start: 2020-09-22 | End: 2020-09-22

## 2020-09-22 RX ORDER — FENTANYL CITRATE/PF 50 MCG/ML
25 SYRINGE (ML) INJECTION
Status: DISCONTINUED | OUTPATIENT
Start: 2020-09-22 | End: 2020-09-22 | Stop reason: HOSPADM

## 2020-09-22 RX ORDER — MAGNESIUM HYDROXIDE 1200 MG/15ML
LIQUID ORAL AS NEEDED
Status: DISCONTINUED | OUTPATIENT
Start: 2020-09-22 | End: 2020-09-22 | Stop reason: HOSPADM

## 2020-09-22 RX ORDER — FENTANYL CITRATE 50 UG/ML
INJECTION, SOLUTION INTRAMUSCULAR; INTRAVENOUS AS NEEDED
Status: DISCONTINUED | OUTPATIENT
Start: 2020-09-22 | End: 2020-09-22

## 2020-09-22 RX ORDER — ROCURONIUM BROMIDE 10 MG/ML
INJECTION, SOLUTION INTRAVENOUS AS NEEDED
Status: DISCONTINUED | OUTPATIENT
Start: 2020-09-22 | End: 2020-09-22

## 2020-09-22 RX ORDER — LIDOCAINE HYDROCHLORIDE 10 MG/ML
INJECTION, SOLUTION EPIDURAL; INFILTRATION; INTRACAUDAL; PERINEURAL AS NEEDED
Status: DISCONTINUED | OUTPATIENT
Start: 2020-09-22 | End: 2020-09-22

## 2020-09-22 RX ORDER — SODIUM CHLORIDE, SODIUM LACTATE, POTASSIUM CHLORIDE, CALCIUM CHLORIDE 600; 310; 30; 20 MG/100ML; MG/100ML; MG/100ML; MG/100ML
INJECTION, SOLUTION INTRAVENOUS CONTINUOUS PRN
Status: DISCONTINUED | OUTPATIENT
Start: 2020-09-22 | End: 2020-09-22

## 2020-09-22 RX ORDER — OXYCODONE HYDROCHLORIDE AND ACETAMINOPHEN 5; 325 MG/1; MG/1
1 TABLET ORAL EVERY 6 HOURS PRN
Qty: 15 TABLET | Refills: 0 | Status: SHIPPED | OUTPATIENT
Start: 2020-09-22 | End: 2020-09-26

## 2020-09-22 RX ORDER — OXYCODONE HYDROCHLORIDE AND ACETAMINOPHEN 5; 325 MG/1; MG/1
2 TABLET ORAL EVERY 4 HOURS PRN
Status: DISCONTINUED | OUTPATIENT
Start: 2020-09-22 | End: 2020-09-22 | Stop reason: HOSPADM

## 2020-09-22 RX ORDER — CEPHALEXIN 500 MG/1
500 CAPSULE ORAL EVERY 12 HOURS SCHEDULED
Qty: 10 CAPSULE | Refills: 0 | Status: SHIPPED | OUTPATIENT
Start: 2020-09-22 | End: 2020-09-27

## 2020-09-22 RX ORDER — GLYCOPYRROLATE 0.2 MG/ML
INJECTION INTRAMUSCULAR; INTRAVENOUS AS NEEDED
Status: DISCONTINUED | OUTPATIENT
Start: 2020-09-22 | End: 2020-09-22

## 2020-09-22 RX ORDER — ONDANSETRON 2 MG/ML
4 INJECTION INTRAMUSCULAR; INTRAVENOUS ONCE
Status: COMPLETED | OUTPATIENT
Start: 2020-09-22 | End: 2020-09-22

## 2020-09-22 RX ORDER — MIDAZOLAM HYDROCHLORIDE 2 MG/2ML
INJECTION, SOLUTION INTRAMUSCULAR; INTRAVENOUS AS NEEDED
Status: DISCONTINUED | OUTPATIENT
Start: 2020-09-22 | End: 2020-09-22

## 2020-09-22 RX ORDER — FENTANYL CITRATE 50 UG/ML
50 INJECTION, SOLUTION INTRAMUSCULAR; INTRAVENOUS ONCE
Status: COMPLETED | OUTPATIENT
Start: 2020-09-22 | End: 2020-09-22

## 2020-09-22 RX ORDER — SODIUM CHLORIDE, SODIUM LACTATE, POTASSIUM CHLORIDE, CALCIUM CHLORIDE 600; 310; 30; 20 MG/100ML; MG/100ML; MG/100ML; MG/100ML
125 INJECTION, SOLUTION INTRAVENOUS CONTINUOUS
Status: CANCELLED | OUTPATIENT
Start: 2020-09-22

## 2020-09-22 RX ORDER — DIPHENHYDRAMINE HYDROCHLORIDE 50 MG/ML
12.5 INJECTION INTRAMUSCULAR; INTRAVENOUS ONCE AS NEEDED
Status: DISCONTINUED | OUTPATIENT
Start: 2020-09-22 | End: 2020-09-22 | Stop reason: HOSPADM

## 2020-09-22 RX ORDER — NEOSTIGMINE METHYLSULFATE 1 MG/ML
INJECTION INTRAVENOUS AS NEEDED
Status: DISCONTINUED | OUTPATIENT
Start: 2020-09-22 | End: 2020-09-22

## 2020-09-22 RX ORDER — OXYCODONE HYDROCHLORIDE AND ACETAMINOPHEN 5; 325 MG/1; MG/1
1 TABLET ORAL EVERY 4 HOURS PRN
Status: DISCONTINUED | OUTPATIENT
Start: 2020-09-22 | End: 2020-09-22 | Stop reason: HOSPADM

## 2020-09-22 RX ORDER — ONDANSETRON 2 MG/ML
INJECTION INTRAMUSCULAR; INTRAVENOUS AS NEEDED
Status: DISCONTINUED | OUTPATIENT
Start: 2020-09-22 | End: 2020-09-22

## 2020-09-22 RX ORDER — LIDOCAINE HYDROCHLORIDE 20 MG/ML
1 JELLY TOPICAL ONCE
Status: COMPLETED | OUTPATIENT
Start: 2020-09-22 | End: 2020-09-22

## 2020-09-22 RX ORDER — SODIUM CHLORIDE, SODIUM LACTATE, POTASSIUM CHLORIDE, CALCIUM CHLORIDE 600; 310; 30; 20 MG/100ML; MG/100ML; MG/100ML; MG/100ML
75 INJECTION, SOLUTION INTRAVENOUS CONTINUOUS
Status: CANCELLED | OUTPATIENT
Start: 2020-09-22

## 2020-09-22 RX ORDER — CEFAZOLIN SODIUM 2 G/50ML
SOLUTION INTRAVENOUS AS NEEDED
Status: DISCONTINUED | OUTPATIENT
Start: 2020-09-22 | End: 2020-09-22

## 2020-09-22 RX ORDER — CEFAZOLIN SODIUM 2 G/50ML
2000 SOLUTION INTRAVENOUS ONCE
Status: COMPLETED | OUTPATIENT
Start: 2020-09-22 | End: 2020-09-22

## 2020-09-22 RX ADMIN — FENTANYL CITRATE 50 MCG: 50 INJECTION INTRAMUSCULAR; INTRAVENOUS at 21:41

## 2020-09-22 RX ADMIN — ROCURONIUM BROMIDE 30 MG: 10 INJECTION, SOLUTION INTRAVENOUS at 12:11

## 2020-09-22 RX ADMIN — PROPOFOL 150 MG: 10 INJECTION, EMULSION INTRAVENOUS at 12:11

## 2020-09-22 RX ADMIN — NEOSTIGMINE METHYLSULFATE 3 MG: 1 INJECTION INTRAVENOUS at 12:51

## 2020-09-22 RX ADMIN — GLYCOPYRROLATE 0.2 MG: 0.2 INJECTION, SOLUTION INTRAMUSCULAR; INTRAVENOUS at 12:22

## 2020-09-22 RX ADMIN — SODIUM CHLORIDE, SODIUM LACTATE, POTASSIUM CHLORIDE, AND CALCIUM CHLORIDE: .6; .31; .03; .02 INJECTION, SOLUTION INTRAVENOUS at 12:01

## 2020-09-22 RX ADMIN — PHENYLEPHRINE HYDROCHLORIDE 100 MCG: 10 INJECTION INTRAVENOUS at 12:40

## 2020-09-22 RX ADMIN — ONDANSETRON 4 MG: 2 INJECTION INTRAMUSCULAR; INTRAVENOUS at 21:41

## 2020-09-22 RX ADMIN — PHENYLEPHRINE HYDROCHLORIDE 100 MCG: 10 INJECTION INTRAVENOUS at 12:51

## 2020-09-22 RX ADMIN — CEFAZOLIN SODIUM 2000 MG: 2 SOLUTION INTRAVENOUS at 12:00

## 2020-09-22 RX ADMIN — LIDOCAINE HYDROCHLORIDE 1 APPLICATION: 20 JELLY TOPICAL at 21:46

## 2020-09-22 RX ADMIN — GLYCOPYRROLATE 0.2 MG: 0.2 INJECTION, SOLUTION INTRAMUSCULAR; INTRAVENOUS at 12:51

## 2020-09-22 RX ADMIN — DEXAMETHASONE SODIUM PHOSPHATE 4 MG: 4 INJECTION, SOLUTION INTRA-ARTICULAR; INTRALESIONAL; INTRAMUSCULAR; INTRAVENOUS; SOFT TISSUE at 12:21

## 2020-09-22 RX ADMIN — FENTANYL CITRATE 100 MCG: 50 INJECTION INTRAMUSCULAR; INTRAVENOUS at 12:07

## 2020-09-22 RX ADMIN — SODIUM CHLORIDE 1000 ML: 0.9 INJECTION, SOLUTION INTRAVENOUS at 21:41

## 2020-09-22 RX ADMIN — LIDOCAINE HYDROCHLORIDE 50 MG: 10 INJECTION, SOLUTION EPIDURAL; INFILTRATION; INTRACAUDAL; PERINEURAL at 12:11

## 2020-09-22 RX ADMIN — PHENYLEPHRINE HYDROCHLORIDE 100 MCG: 10 INJECTION INTRAVENOUS at 12:33

## 2020-09-22 RX ADMIN — ONDANSETRON HYDROCHLORIDE 4 MG: 2 INJECTION, SOLUTION INTRAMUSCULAR; INTRAVENOUS at 12:21

## 2020-09-22 RX ADMIN — MIDAZOLAM HYDROCHLORIDE 2 MG: 1 INJECTION, SOLUTION INTRAMUSCULAR; INTRAVENOUS at 12:07

## 2020-09-22 NOTE — PERIOPERATIVE NURSING NOTE
Stent string is visible and taped to penis  No drainage  Minimal burning sensation reported, no med required at this time

## 2020-09-22 NOTE — DISCHARGE INSTRUCTIONS
ALL YOUR  PREVIOUS MEDS ARE THE SAME  NEW MEDS:  Cephalexin antibiotic, twice per day for five days  BE CAREFUL NOT TO PULL THE STRING  EXPECT SOME BLOOD IN URINE, BURNING, FREQUENT URINATION

## 2020-09-22 NOTE — TELEPHONE ENCOUNTER
Reason for Disposition   [1] SEVERE pain (e g , excruciating) AND [2] present > 1 hour    Answer Assessment - Initial Assessment Questions  1  LOCATION: "Where does it hurt?"       pelvic pain  2  RADIATION: "Does the pain shoot anywhere else?" (e g , chest, back)      back  3  ONSET: "When did the pain begin?" (e g , minutes, hours or days ago)       Unable to void and started few hours ago  4  SUDDEN: "Gradual or sudden onset?"     Gradual after procedure  5  PATTERN "Does the pain come and go, or is it constant?"     - If constant: "Is it getting better, staying the same, or worsening?"       (Note: Constant means the pain never goes away completely; most serious pain is constant and it progresses)      - If intermittent: "How long does it last?" "Do you have pain now?"      (Note: Intermittent means the pain goes away completely between bouts)      Yes severe took Percocet 1 at 1630  6  SEVERITY: "How bad is the pain?"  (e g , Scale 1-10; mild, moderate, or severe)     - MILD (1-3): doesn't interfere with normal activities, abdomen soft and not tender to touch      - MODERATE (4-7): interferes with normal activities or awakens from sleep, tender to touch      - SEVERE (8-10): excruciating pain, doubled over, unable to do any normal activities       sever  7  RECURRENT SYMPTOM: "Have you ever had this type of abdominal pain before?" If so, ask: "When was the last time?" and "What happened that time?"       Not this severe  8  AGGRAVATING FACTORS: "Does anything seem to cause this pain?" (e g , foods, stress, alcohol)      no  9  CARDIAC SYMPTOMS: "Do you have any of the following symptoms: chest pain, difficulty breathing, sweating, nausea?"     Nausea and excruciating pain  10  OTHER SYMPTOMS: "Do you have any other symptoms?" (e g , fever, vomiting, diarrhea)      Unable to void and nauseated as well as clot blood from penis as he was forcefully trying to void  11   PREGNANCY: "Is there any chance you are pregnant?" "When was your last menstrual period?"        N/a    Protocols used: ABDOMINAL PAIN - UPPER-ADULT-AH

## 2020-09-22 NOTE — TELEPHONE ENCOUNTER
Regarding: Pain    ----- Message from Moe Goodson sent at 9/22/2020  6:20 PM EDT -----  I am in extreme pain all over

## 2020-09-22 NOTE — OP NOTE
OPERATIVE REPORT  PATIENT NAME: Argenis Rodriguez    :  1945  MRN: [id number]  Pt Location:  OR ROOM 10    SURGERY DATE: 2020    Surgeon(s) and Role:     * Shahnaz Martínez MD - Primary    Preop Diagnosis:  Renal calculus [N20 0]    Post-Op Diagnosis Codes:     * Renal calculus [N20 0]    Procedure(s) (LRB):  CYSTOSCOPY URETEROSCOPY WITH LITHOTRIPSY HOLMIUM LASER, RETROGRADE PYELOGRAM AND INSERTION STENT URETERAL (Left)    Specimen(s):  * No specimens in log *    Estimated Blood Loss:   Minimal    Drains:  * No LDAs found *    Anesthesia Type:   General/LMA    Operative Indications:  Renal calculus [N20 0]  And stone upper left ureter    Operative Findings:  8 mm stone impacted in upper left ureter  Several other smaller stones in left renal collecting system    Complications:   None    Procedure and Technique:      PLAN FOR STENT:  Will be removed with string by nurse in 10 days,  or        The patient was brought into the OR, properly identified and positioned on the table  General anesthesia was induced, and he was placed in lithotomy position and prepped and draped in the usual sterile fashion  Compression boots were employed  Intravenous antibiotic was administered and an appropriate time-out performed  The 22F scope was introduced in the urethra, which showed no stricture  The prostate had significant trilobar hyperplasia, with large median lobe  The bladder was inspected and had no lesions, stones, or tumors , did have moderate trabeculation, and minimal PVR  The left ureteral orifice was identified and retrograde pyelogram performed, showing stone in upper left ureter  Two wires were placed, and an access sheath was placed over one wire  The flexible scope was placed thru a sheath and advanced to stone, in the upper ureter, the stone appeared to be impacted in the ureter    The Holmium laser was used on various settings to break up stone into small particles   Care was taken not to laser tissue  Some of the particles floated back into the kidney, and I placed the scope in the kidney  Other stones in the middle and lower calices were also lasered and fragmented  All particles appeared small enough to pass around the stent  The scope was removed from the ureter, and the cystoscope was used to place a 6F Contour VL stent in the ureter, with the upper coils in the renal pelvis, and the distal coil in the bladder  Retrograde pyelogram on that side confirmed good position and no extravasation of contrast    The patient was awaken from anesthesia and taken to the PACU in good condition        I was present for the entire procedure    Patient Disposition:  PACU     SIGNATURE: Tona Singh MD  DATE: September 22, 2020  TIME: 4:58 PM

## 2020-09-22 NOTE — INTERVAL H&P NOTE
H&P reviewed  After examining the patient I find no changes in the patients condition since the H&P had been written      Vitals:    09/22/20 0936   BP: 129/76   Pulse: 57   Resp: 18   Temp: 98 5 °F (36 9 °C)   SpO2: 93%

## 2020-09-23 ENCOUNTER — TELEPHONE (OUTPATIENT)
Dept: UROLOGY | Facility: MEDICAL CENTER | Age: 75
End: 2020-09-23

## 2020-09-23 NOTE — TELEPHONE ENCOUNTER
Pt s/p cysto/urs/stent surgery yesterday w/Dr April Mauro in ER last night in urinary retention  Aaron placed  Dr Rebecca Landrum Op Note states stent with string should be removed Oct 1 or 2  Will ask Dr April Mauro when aaron/stent can be removed and advise patient to set up appt

## 2020-09-23 NOTE — ED NOTES
Bladder scan performed at this time as patient continues to complain of lower abdominal pain, pressure, unable to void since 1500  112 ml urine in bladder at this time  Vital signs stable  Patient back to waiting room        Artur Armstrong RN  09/22/20 2047

## 2020-09-23 NOTE — ED PROVIDER NOTES
History  Chief Complaint   Patient presents with    Pelvic Pain     pt got cysto and stones blasted  started having clots come out of his penis and now is unable to void  pt took percocet and zofran at 1630 but still has pain and nausea      81-year-old male presenting after lithotripsy/stent placement earlier today with hematuria and urinary retention  Patient's procedure was around noon, was able to void prior to leaving the hospital   Since that time he has only had a very small amount of gross blood, last episode was around 3:00 p m , 6 hours ago  He states the urge to urinate, however is unable to do so  States that he noted small blood clots earlier  He is not on any blood thinners  He complains of left sided abdominal pain and nausea  Was prescribed Percocet, however states that he vomited this up and is still having significant pain  Denies any fevers, chills, CP, SOB  History of multiple prior kidney stones in the past   Has never needed a Dawson catheter  MDM:  81-year-old male with hematuria/urinary retention after cystoscopy/lithotripsy/stent placement, will treat pain, insert Dawson and irrigate if needed           Per records: Today OR with Dr Grubbs Shoulder, RETROGRADE PYELOGRAM AND INSERTION STENT URETERAL    8 mm stone impacted in upper left ureter  Several other smaller stones in left renal collecting system    Prior to Admission Medications   Prescriptions Last Dose Informant Patient Reported?  Taking?   allopurinol (ZYLOPRIM) 300 mg tablet  Self No No   Sig: Take 1 tablet (300 mg total) by mouth daily   amLODIPine (NORVASC) 10 mg tablet   No No   Sig: Take 1 tablet (10 mg total) by mouth daily   amoxicillin (AMOXIL) 500 mg capsule  Self Yes No   Sig: Take 500 mg by mouth once as needed 4 caps before dental work   atorvastatin (LIPITOR) 10 mg tablet   No No   Sig: Take 1 tablet (10 mg total) by mouth daily   cephalexin (KEFLEX) 500 mg capsule   No No   Sig: Take 1 capsule (500 mg total) by mouth every 12 (twelve) hours for 5 days   gabapentin (NEURONTIN) 300 mg capsule   Yes No   Sig: Take 300 mg by mouth daily at bedtime   hydrochlorothiazide (HYDRODIURIL) 25 mg tablet  Self No No   Sig: Take 1 tablet (25 mg total) by mouth daily   losartan (COZAAR) 100 MG tablet  Self No No   Sig: Take 1 tablet (100 mg total) by mouth daily   metroNIDAZOLE (METROGEL) 1 % gel   No No   Sig: Apply topically daily   naproxen (NAPROSYN) 250 mg tablet  Self Yes No   Sig: Take 250 mg by mouth 2 (two) times a day as needed for mild pain   omeprazole (PriLOSEC) 20 mg delayed release capsule   No No   Sig: Take 1 capsule (20 mg total) by mouth daily   ondansetron (ZOFRAN-ODT) 4 mg disintegrating tablet   No No   Sig: Take 1 tablet (4 mg total) by mouth every 8 (eight) hours as needed for nausea or vomiting for up to 5 days   oxyCODONE-acetaminophen (PERCOCET) 5-325 mg per tablet   No No   Sig: Take 1 tablet by mouth every 6 (six) hours as needed for moderate pain for up to 4 daysMax Daily Amount: 4 tablets   oxymetazoline (AFRIN) 0 05 % nasal spray  Self Yes No   Si sprays by Each Nare route 2 (two) times a day   tamsulosin (FLOMAX) 0 4 mg  Self No No   Sig: TAKE 1 CAPSULE (0 4 MG TOTAL) BY MOUTH DAILY AT BEDTIME   timolol (TIMOPTIC) 0 5 % ophthalmic solution  Self Yes No   Sig: Administer 1 drop to both eyes 2 (two) times a day       Facility-Administered Medications: None       Past Medical History:   Diagnosis Date    Hiatal hernia     Hyperlipidemia     Hypertension     Kidney stone     Left inguinal hernia     Lyme disease     last assessed 7/10/2017    Peripheral neuropathy        Past Surgical History:   Procedure Laterality Date    ABDOMINAL SURGERY      CATARACT EXTRACTION      COLONOSCOPY      last assessed 2015    DUPUYTREN / PALMAR FASCIOTOMY      last assessed 2015    DUPUYTREN CONTRACTURE RELEASE Left     ESOPHAGOGASTRODUODENOSCOPY      HERNIA REPAIR Right     inguinal    HERNIA REPAIR Left 2/9/2017    Procedure: REPAIR HERNIA INGUINAL, LAPAROSCOPIC WITH MESH;  Surgeon: Rox Orozco MD;  Location: [facility] OR;  Service:    [facility] Bilateral 2011    with mesh done at [facility]      hip replacement    LITHOTRIPSY      AZ COLONOSCOPY FLX DX W/COLLJ Anh 1978 PFRMD N/A 10/31/2018    Procedure: EGD AND COLONOSCOPY;  Surgeon: Nader Wong MD;  Location: [facility] GI LAB; Service: Gastroenterology    TONSILLECTOMY      TOTAL HIP ARTHROPLASTY Right     last assessed 12/5/2014       Family History   Problem Relation Age of Onset    Coronary artery disease Mother     Cancer Father      I have reviewed and agree with the history as documented  E-Cigarette/Vaping    E-Cigarette Use Never User      E-Cigarette/Vaping Substances    Nicotine No     THC No     CBD No     Flavoring No     Other No     Unknown No      Social History     Tobacco Use    Smoking status: Never Smoker    Smokeless tobacco: Never Used    Tobacco comment: per allscripts ' former smoker / never a smoker '   Substance Use Topics    Alcohol use: Yes     Alcohol/week: 7 0 standard drinks     Types: 7 Glasses of wine per week     Frequency: 4 or more times a week     Drinks per session: 1 or 2     Binge frequency: Never    Drug use: No       Review of Systems   Constitutional: Negative for chills, fever and unexpected weight change  HENT: Negative for ear pain, rhinorrhea and sore throat  Eyes: Negative for pain and visual disturbance  Respiratory: Negative for cough and shortness of breath  Cardiovascular: Negative for chest pain and leg swelling  Gastrointestinal: Positive for abdominal pain, nausea and vomiting  Negative for constipation and diarrhea  Endocrine: Negative for polydipsia, polyphagia and polyuria     Genitourinary: Positive for difficulty urinating and hematuria  Musculoskeletal: Negative for back pain, myalgias and neck pain  Skin: Negative for color change and rash  Allergic/Immunologic: Negative for environmental allergies and immunocompromised state  Neurological: Negative for dizziness, weakness, light-headedness, numbness and headaches  Hematological: Negative for adenopathy  Does not bruise/bleed easily  Psychiatric/Behavioral: Negative for agitation and confusion  All other systems reviewed and are negative  Physical Exam  Physical Exam  Vitals signs and nursing note reviewed  Constitutional:       Appearance: He is well-developed  HENT:      Head: Normocephalic and atraumatic  Nose: Nose normal    Eyes:      Conjunctiva/sclera: Conjunctivae normal    Neck:      Musculoskeletal: Normal range of motion and neck supple  Cardiovascular:      Rate and Rhythm: Normal rate and regular rhythm  Heart sounds: Normal heart sounds  Pulmonary:      Effort: Pulmonary effort is normal  No respiratory distress  Breath sounds: Normal breath sounds  No stridor  No wheezing or rales  Chest:      Chest wall: No tenderness  Abdominal:      General: There is no distension  Palpations: Abdomen is soft  Tenderness: There is no abdominal tenderness  There is no guarding or rebound  Comments: Mild tenderness to palpation over suprapubic region and left abdomen, no rebound or guarding   Musculoskeletal:         General: No deformity  Skin:     General: Skin is warm and dry  Findings: No rash  Neurological:      Mental Status: He is alert and oriented to person, place, and time  Motor: No abnormal muscle tone  Coordination: Coordination normal    Psychiatric:         Thought Content:  Thought content normal          Judgment: Judgment normal          Vital Signs  ED Triage Vitals   Temperature Pulse Respirations Blood Pressure SpO2   09/22/20 1909 09/22/20 1909 09/22/20 1909 09/22/20 1909 09/22/20 1909 98 4 °F (36 9 °C) 98 18 (!) 179/78 94 %      Temp Source Heart Rate Source Patient Position - Orthostatic VS BP Location FiO2 (%)   09/22/20 1909 09/22/20 2029 09/22/20 2029 09/22/20 2029 --   Oral Monitor Sitting Right arm       Pain Score       09/22/20 2029       Worst Possible Pain           Vitals:    09/22/20 1909 09/22/20 2029 09/22/20 2121 09/22/20 2315   BP: (!) 179/78 162/95 (!) 173/93 153/90   Pulse: 98  99 92   Patient Position - Orthostatic VS:  Sitting Lying Lying         Visual Acuity      ED Medications  Medications   sodium chloride 0 9 % bolus 1,000 mL (0 mL Intravenous Stopped 9/22/20 2231)   fentanyl citrate (PF) 100 MCG/2ML 50 mcg (50 mcg Intravenous Given 9/22/20 2141)   ondansetron (ZOFRAN) injection 4 mg (4 mg Intravenous Given 9/22/20 2141)   lidocaine (URO-JET) 2 % urethral/mucosal gel 1 application (1 application Urethral Given 9/22/20 2146)       Diagnostic Studies  Results Reviewed     None                 No orders to display              Procedures  Procedures         ED Course  ED Course as of Sep 23 0417   Tue Sep 22, 2020   2122 Messaged Urology      2126 Per urology, okay to place aaron, if string gets pushed up, can scope in office to retrieve  Agree with 3 way aaron, if bloody will irrigate, if clears will DC with aaron, if not will admit  No need for abdominal imaging to look at stent/hydro, sounds like expected post-op pain      2237 Aaron placed, maroon-colored urine drained, will start continuous bladder irrigation      2312 Pt reassessed  Pain controlled  CBI going, light pink color now  Wed Sep 23, 2020   0005 Reassessed, urine light pink   Pain controlled, resting comfortably      0057 Pt agreeable to being discharged with aaron, will teach how to empty, etc  And have pt f/u in urology office for aaron removal/re-evaluation                                          MDM  Number of Diagnoses or Management Options  Hematuria:   Urinary retention:   Diagnosis management comments: 56-year-old male postop day 0 after lithotripsy and stent placement presenting with urinary retention and hematuria  Case discussed with Urology  Three-way Dawson was placed and bladder was irrigated in the ED  Initially had bloody drainage, which cleared to a light pink color  Patient instructed to call urology office tomorrow for follow-up  Return precautions discussed and patient expressed understanding with plan       Amount and/or Complexity of Data Reviewed  Discuss the patient with other providers: yes (Urology, Vijaya)        Disposition  Final diagnoses:   Urinary retention   Hematuria     Time reflects when diagnosis was documented in both MDM as applicable and the Disposition within this note     Time User Action Codes Description Comment    9/23/2020  1:17 AM Glory Spitz A Add [R33 9] Urinary retention     9/23/2020  1:17 AM Glory Spitz A Add [R31 9] Hematuria       ED Disposition     ED Disposition Condition Date/Time Comment    Discharge Stable Wed Sep 23, 2020  1:17 AM Nancy Bose discharge to home/self care  Follow-up Information     Follow up With Specialties Details Why [facility],  Internal Medicine   [address] [place] [address]  [phone]      Dee Fernando MD Urology   [address] [address]  [phone]            Discharge Medication List as of 9/23/2020  1:18 AM      CONTINUE these medications which have NOT CHANGED    Details   allopurinol (ZYLOPRIM) 300 mg tablet Take 1 tablet (300 mg total) by mouth daily, Starting Wed 1/8/2020, Normal      amLODIPine (NORVASC) 10 mg tablet Take 1 tablet (10 mg total) by mouth daily, Starting Mon 4/20/2020, Normal      amoxicillin (AMOXIL) 500 mg capsule Take 500 mg by mouth once as needed 4 caps before dental work, Historical Med      atorvastatin (LIPITOR) 10 mg tablet Take 1 tablet (10 mg total) by mouth daily, Starting Tue 3/17/2020, Normal cephalexin (KEFLEX) 500 mg capsule Take 1 capsule (500 mg total) by mouth every 12 (twelve) hours for 5 days, Starting Tue 9/22/2020, Until Sun 9/27/2020, Normal      gabapentin (NEURONTIN) 300 mg capsule Take 300 mg by mouth daily at bedtime, Historical Med      hydrochlorothiazide (HYDRODIURIL) 25 mg tablet Take 1 tablet (25 mg total) by mouth daily, Starting Thu 10/24/2019, Normal      losartan (COZAAR) 100 MG tablet Take 1 tablet (100 mg total) by mouth daily, Starting Wed 11/13/2019, No Print      metroNIDAZOLE (METROGEL) 1 % gel Apply topically daily, Starting Thu 6/4/2020, Normal      naproxen (NAPROSYN) 250 mg tablet Take 250 mg by mouth 2 (two) times a day as needed for mild pain, Historical Med      omeprazole (PriLOSEC) 20 mg delayed release capsule Take 1 capsule (20 mg total) by mouth daily, Starting Wed 2/26/2020, No Print      ondansetron (ZOFRAN-ODT) 4 mg disintegrating tablet Take 1 tablet (4 mg total) by mouth every 8 (eight) hours as needed for nausea or vomiting for up to 5 days, Starting Fri 9/18/2020, Until Wed 9/23/2020, Normal      oxyCODONE-acetaminophen (PERCOCET) 5-325 mg per tablet Take 1 tablet by mouth every 6 (six) hours as needed for moderate pain for up to 4 daysMax Daily Amount: 4 tablets, Starting Tue 9/22/2020, Until Sat 9/26/2020, Normal      oxymetazoline (AFRIN) 0 05 % nasal spray 2 sprays by Each Nare route 2 (two) times a day, Historical Med      tamsulosin (FLOMAX) 0 4 mg TAKE 1 CAPSULE (0 4 MG TOTAL) BY MOUTH DAILY AT BEDTIME, Starting Mon 10/7/2019, Normal      timolol (TIMOPTIC) 0 5 % ophthalmic solution Administer 1 drop to both eyes 2 (two) times a day , Starting Fri 6/29/2018, Historical Med           No discharge procedures on file      PDMP Review     None          ED Provider  Electronically Signed by           Tomas Santos DO  09/23/20 8298

## 2020-09-23 NOTE — TELEPHONE ENCOUNTER
Spoke to pt and advised that he would receive a call back re aaron and stent removal as soon as we hear from provider as to when this can be done

## 2020-09-23 NOTE — DISCHARGE INSTRUCTIONS
Call Urology office tomorrow  Return to ED if any new/worsening symptoms or concerns including but not limited to aaron not draining, intractable nausea/pain, fevers, etc

## 2020-09-23 NOTE — TELEPHONE ENCOUNTER
Please Triage - Existing Patient  Had surgery w/ Dr Tabby Burr 9/22     Existing patient calling to schedule a follow up appointment with Urology  Seen at [facility] last night   Discharged with Dawson    Patient can be reached at: [phone]

## 2020-09-23 NOTE — TELEPHONE ENCOUNTER
Pt s/p cysto/urs/stent surgery yesterday w/Dr Rios Feeling in ER last night in urinary retention  Aaron placed  Dr Alessio Stuart Op Note states stent with string should be removed Oct 1 or 2  Will ask Dr Rios Feeling when aaron/stent can be removed and advise patient to set up appt

## 2020-09-23 NOTE — TELEPHONE ENCOUNTER
Appt given for 09/28/20 for aaron removal   He will need an appt on 10/01/ or 10/02 for string stent pull

## 2020-09-23 NOTE — TELEPHONE ENCOUNTER
Multiple encounters for pateint  Please see chart for follow up  Pt was seen in the ER last night and aaron catheter was placed

## 2020-09-28 ENCOUNTER — PROCEDURE VISIT (OUTPATIENT)
Dept: UROLOGY | Facility: MEDICAL CENTER | Age: 75
End: 2020-09-28
Payer: COMMERCIAL

## 2020-09-28 VITALS
TEMPERATURE: 97.6 F | HEART RATE: 68 BPM | DIASTOLIC BLOOD PRESSURE: 68 MMHG | HEIGHT: 67 IN | WEIGHT: 157 LBS | SYSTOLIC BLOOD PRESSURE: 112 MMHG | BODY MASS INDEX: 24.64 KG/M2

## 2020-09-28 DIAGNOSIS — N13.8 BPH WITH OBSTRUCTION/LOWER URINARY TRACT SYMPTOMS: Primary | ICD-10-CM

## 2020-09-28 DIAGNOSIS — N20.0 RENAL CALCULI: ICD-10-CM

## 2020-09-28 DIAGNOSIS — N40.1 BPH WITH OBSTRUCTION/LOWER URINARY TRACT SYMPTOMS: Primary | ICD-10-CM

## 2020-09-28 PROCEDURE — 99211 OFF/OP EST MAY X REQ PHY/QHP: CPT

## 2020-09-28 NOTE — PROGRESS NOTES
9/28/2020    Para Dre is a 70 y o  male  [id number]    Diagnosis:  Chief Complaint     Dawson Catheter Removal        Patient presents for Dawson Catheter removal s/p CYSTOSCOPY URETEROSCOPY WITH LITHOTRIPSY HOLMIUM LASER, RETROGRADE PYELOGRAM AND INSERTION STENT URETERAL (Left)  on 9/22/20 with Dr Karen Meier  Plan:  Return 10/1/20 for Stent w/string removal     Procedure: Dawson removed without difficulty, patient tolerated procedure well  Urine draining clear yellow  Denies fever or urinary symptoms  Patient instructed to increase fluids, especially water, and limit caffeine intake  To return to office if unable to void within 4-6 hours, or has increased discomfort      Vitals:    09/28/20 0856   BP: 112/68   Pulse: 68   Temp: 97 6 °F (36 4 °C)   Weight: 71 2 kg (157 lb)   Height: 5' 7" (1 702 m)       Boy Collins LPN

## 2020-09-30 ENCOUNTER — APPOINTMENT (EMERGENCY)
Dept: RADIOLOGY | Facility: HOSPITAL | Age: 75
DRG: 698 | End: 2020-09-30
Payer: COMMERCIAL

## 2020-09-30 ENCOUNTER — TELEMEDICINE (OUTPATIENT)
Dept: FAMILY MEDICINE CLINIC | Facility: CLINIC | Age: 75
End: 2020-09-30
Payer: COMMERCIAL

## 2020-09-30 ENCOUNTER — APPOINTMENT (EMERGENCY)
Dept: CT IMAGING | Facility: HOSPITAL | Age: 75
DRG: 698 | End: 2020-09-30
Payer: COMMERCIAL

## 2020-09-30 ENCOUNTER — OFFICE VISIT (OUTPATIENT)
Dept: URGENT CARE | Facility: MEDICAL CENTER | Age: 75
End: 2020-09-30
Payer: COMMERCIAL

## 2020-09-30 ENCOUNTER — HOSPITAL ENCOUNTER (INPATIENT)
Facility: HOSPITAL | Age: 75
LOS: 4 days | Discharge: HOME | DRG: 698 | End: 2020-10-04
Attending: EMERGENCY MEDICINE | Admitting: INTERNAL MEDICINE
Payer: COMMERCIAL

## 2020-09-30 VITALS
HEIGHT: 67 IN | BODY MASS INDEX: 23.91 KG/M2 | SYSTOLIC BLOOD PRESSURE: 83 MMHG | HEART RATE: 90 BPM | RESPIRATION RATE: 16 BRPM | DIASTOLIC BLOOD PRESSURE: 55 MMHG | TEMPERATURE: 96.6 F | OXYGEN SATURATION: 95 % | WEIGHT: 152.34 LBS

## 2020-09-30 VITALS — BODY MASS INDEX: 23.49 KG/M2 | WEIGHT: 150 LBS | TEMPERATURE: 99.2 F

## 2020-09-30 DIAGNOSIS — A41.9 SEPSIS SECONDARY TO UTI (HCC): Primary | ICD-10-CM

## 2020-09-30 DIAGNOSIS — R31.9 HEMATURIA, UNSPECIFIED TYPE: ICD-10-CM

## 2020-09-30 DIAGNOSIS — N39.0 SEPSIS SECONDARY TO UTI (HCC): Primary | ICD-10-CM

## 2020-09-30 DIAGNOSIS — R50.9 FEVER, UNSPECIFIED FEVER CAUSE: ICD-10-CM

## 2020-09-30 DIAGNOSIS — R50.9 FEVER, UNSPECIFIED FEVER CAUSE: Primary | ICD-10-CM

## 2020-09-30 DIAGNOSIS — N41.1 PROSTATITIS, CHRONIC: Chronic | ICD-10-CM

## 2020-09-30 DIAGNOSIS — Z96.0 STATUS POST PLACEMENT OF URETERAL STENT: ICD-10-CM

## 2020-09-30 DIAGNOSIS — I95.9 HYPOTENSION, UNSPECIFIED HYPOTENSION TYPE: Primary | ICD-10-CM

## 2020-09-30 DIAGNOSIS — N20.0 NEPHROLITHIASIS: Chronic | ICD-10-CM

## 2020-09-30 DIAGNOSIS — N17.9 ACUTE RENAL FAILURE (ARF) (HCC): ICD-10-CM

## 2020-09-30 LAB
ALBUMIN SERPL BCP-MCNC: 3 G/DL (ref 3.5–5)
ALP SERPL-CCNC: 61 U/L (ref 46–116)
ALT SERPL W P-5'-P-CCNC: 19 U/L (ref 12–78)
ANION GAP SERPL CALCULATED.3IONS-SCNC: 12 MMOL/L (ref 4–13)
APTT PPP: 40 SECONDS (ref 23–37)
AST SERPL W P-5'-P-CCNC: 19 U/L (ref 5–45)
BACTERIA UR QL AUTO: ABNORMAL /HPF
BASOPHILS # BLD AUTO: 0.05 THOUSANDS/ΜL (ref 0–0.1)
BASOPHILS NFR BLD AUTO: 0 % (ref 0–1)
BILIRUB SERPL-MCNC: 0.54 MG/DL (ref 0.2–1)
BILIRUB UR QL STRIP: ABNORMAL
BUN SERPL-MCNC: 52 MG/DL (ref 5–25)
CALCIUM ALBUM COR SERPL-MCNC: 9.9 MG/DL (ref 8.3–10.1)
CALCIUM SERPL-MCNC: 9.1 MG/DL (ref 8.3–10.1)
CHLORIDE SERPL-SCNC: 96 MMOL/L (ref 100–108)
CLARITY UR: ABNORMAL
CO2 SERPL-SCNC: 26 MMOL/L (ref 21–32)
COLOR UR: ABNORMAL
CREAT SERPL-MCNC: 4.4 MG/DL (ref 0.6–1.3)
EOSINOPHIL # BLD AUTO: 0 THOUSAND/ΜL (ref 0–0.61)
EOSINOPHIL NFR BLD AUTO: 0 % (ref 0–6)
ERYTHROCYTE [DISTWIDTH] IN BLOOD BY AUTOMATED COUNT: 14.1 % (ref 11.6–15.1)
GFR SERPL CREATININE-BSD FRML MDRD: 12 ML/MIN/1.73SQ M
GLUCOSE SERPL-MCNC: 140 MG/DL (ref 65–140)
GLUCOSE UR STRIP-MCNC: NEGATIVE MG/DL
HCT VFR BLD AUTO: 45.8 % (ref 36.5–49.3)
HGB BLD-MCNC: 15 G/DL (ref 12–17)
HGB UR QL STRIP.AUTO: ABNORMAL
IMM GRANULOCYTES # BLD AUTO: 0.22 THOUSAND/UL (ref 0–0.2)
IMM GRANULOCYTES NFR BLD AUTO: 1 % (ref 0–2)
INR PPP: 1.34 (ref 0.84–1.19)
KETONES UR STRIP-MCNC: ABNORMAL MG/DL
LACTATE SERPL-SCNC: 2.2 MMOL/L (ref 0.5–2)
LACTATE SERPL-SCNC: 3.1 MMOL/L (ref 0.5–2)
LEUKOCYTE ESTERASE UR QL STRIP: ABNORMAL
LYMPHOCYTES # BLD AUTO: 0.61 THOUSANDS/ΜL (ref 0.6–4.47)
LYMPHOCYTES NFR BLD AUTO: 3 % (ref 14–44)
MCH RBC QN AUTO: 30.3 PG (ref 26.8–34.3)
MCHC RBC AUTO-ENTMCNC: 32.8 G/DL (ref 31.4–37.4)
MCV RBC AUTO: 93 FL (ref 82–98)
MONOCYTES # BLD AUTO: 1.1 THOUSAND/ΜL (ref 0.17–1.22)
MONOCYTES NFR BLD AUTO: 6 % (ref 4–12)
NEUTROPHILS # BLD AUTO: 16.9 THOUSANDS/ΜL (ref 1.85–7.62)
NEUTS SEG NFR BLD AUTO: 90 % (ref 43–75)
NITRITE UR QL STRIP: NEGATIVE
NON-SQ EPI CELLS URNS QL MICRO: ABNORMAL /HPF
NRBC BLD AUTO-RTO: 0 /100 WBCS
PH UR STRIP.AUTO: 5 [PH]
PLATELET # BLD AUTO: 193 THOUSANDS/UL (ref 149–390)
PMV BLD AUTO: 10.5 FL (ref 8.9–12.7)
POTASSIUM SERPL-SCNC: 3.8 MMOL/L (ref 3.5–5.3)
PROCALCITONIN SERPL-MCNC: 166.2 NG/ML
PROT SERPL-MCNC: 7.5 G/DL (ref 6.4–8.2)
PROT UR STRIP-MCNC: >=300 MG/DL
PROTHROMBIN TIME: 16.3 SECONDS (ref 11.6–14.5)
RBC # BLD AUTO: 4.95 MILLION/UL (ref 3.88–5.62)
RBC #/AREA URNS AUTO: ABNORMAL /HPF
SL AMB  POCT GLUCOSE, UA: ABNORMAL
SL AMB LEUKOCYTE ESTERASE,UA: ABNORMAL
SL AMB POCT BILIRUBIN,UA: ABNORMAL
SL AMB POCT BLOOD,UA: ABNORMAL
SL AMB POCT CLARITY,UA: ABNORMAL
SL AMB POCT COLOR,UA: YELLOW
SL AMB POCT KETONES,UA: ABNORMAL
SL AMB POCT NITRITE,UA: ABNORMAL
SL AMB POCT PH,UA: 6
SL AMB POCT SPECIFIC GRAVITY,UA: 1.03
SL AMB POCT URINE PROTEIN: 2000
SL AMB POCT UROBILINOGEN: 0.2
SODIUM SERPL-SCNC: 134 MMOL/L (ref 136–145)
SP GR UR STRIP.AUTO: >=1.03 (ref 1–1.03)
UROBILINOGEN UR QL STRIP.AUTO: 1 E.U./DL
WBC # BLD AUTO: 18.88 THOUSAND/UL (ref 4.31–10.16)
WBC #/AREA URNS AUTO: ABNORMAL /HPF

## 2020-09-30 PROCEDURE — 99291 CRITICAL CARE FIRST HOUR: CPT

## 2020-09-30 PROCEDURE — 96361 HYDRATE IV INFUSION ADD-ON: CPT

## 2020-09-30 PROCEDURE — G1004 CDSM NDSC: HCPCS

## 2020-09-30 PROCEDURE — 83605 ASSAY OF LACTIC ACID: CPT | Performed by: PHYSICIAN ASSISTANT

## 2020-09-30 PROCEDURE — 71045 X-RAY EXAM CHEST 1 VIEW: CPT

## 2020-09-30 PROCEDURE — 99213 OFFICE O/P EST LOW 20 MIN: CPT | Performed by: PHYSICIAN ASSISTANT

## 2020-09-30 PROCEDURE — 81001 URINALYSIS AUTO W/SCOPE: CPT | Performed by: PHYSICIAN ASSISTANT

## 2020-09-30 PROCEDURE — 87086 URINE CULTURE/COLONY COUNT: CPT | Performed by: PHYSICIAN ASSISTANT

## 2020-09-30 PROCEDURE — 96365 THER/PROPH/DIAG IV INF INIT: CPT

## 2020-09-30 PROCEDURE — 85730 THROMBOPLASTIN TIME PARTIAL: CPT | Performed by: PHYSICIAN ASSISTANT

## 2020-09-30 PROCEDURE — 99213 OFFICE O/P EST LOW 20 MIN: CPT | Performed by: INTERNAL MEDICINE

## 2020-09-30 PROCEDURE — 81002 URINALYSIS NONAUTO W/O SCOPE: CPT | Performed by: PHYSICIAN ASSISTANT

## 2020-09-30 PROCEDURE — 93005 ELECTROCARDIOGRAM TRACING: CPT | Performed by: PHYSICIAN ASSISTANT

## 2020-09-30 PROCEDURE — 84145 PROCALCITONIN (PCT): CPT | Performed by: PHYSICIAN ASSISTANT

## 2020-09-30 PROCEDURE — 87077 CULTURE AEROBIC IDENTIFY: CPT | Performed by: PHYSICIAN ASSISTANT

## 2020-09-30 PROCEDURE — 85610 PROTHROMBIN TIME: CPT | Performed by: PHYSICIAN ASSISTANT

## 2020-09-30 PROCEDURE — 80053 COMPREHEN METABOLIC PANEL: CPT | Performed by: PHYSICIAN ASSISTANT

## 2020-09-30 PROCEDURE — 87186 SC STD MICRODIL/AGAR DIL: CPT | Performed by: PHYSICIAN ASSISTANT

## 2020-09-30 PROCEDURE — 99291 CRITICAL CARE FIRST HOUR: CPT | Performed by: EMERGENCY MEDICINE

## 2020-09-30 PROCEDURE — 87040 BLOOD CULTURE FOR BACTERIA: CPT | Performed by: PHYSICIAN ASSISTANT

## 2020-09-30 PROCEDURE — 74176 CT ABD & PELVIS W/O CONTRAST: CPT

## 2020-09-30 PROCEDURE — 85025 COMPLETE CBC W/AUTO DIFF WBC: CPT | Performed by: PHYSICIAN ASSISTANT

## 2020-09-30 PROCEDURE — 36415 COLL VENOUS BLD VENIPUNCTURE: CPT | Performed by: PHYSICIAN ASSISTANT

## 2020-09-30 RX ADMIN — CEFTRIAXONE SODIUM 1000 MG: 1 INJECTION, POWDER, FOR SOLUTION INTRAMUSCULAR; INTRAVENOUS at 19:59

## 2020-09-30 RX ADMIN — SODIUM CHLORIDE 1000 ML: 0.9 INJECTION, SOLUTION INTRAVENOUS at 19:42

## 2020-09-30 RX ADMIN — SODIUM CHLORIDE 500 ML: 0.9 INJECTION, SOLUTION INTRAVENOUS at 21:30

## 2020-09-30 RX ADMIN — SODIUM CHLORIDE 1000 ML: 0.9 INJECTION, SOLUTION INTRAVENOUS at 20:36

## 2020-09-30 NOTE — PROGRESS NOTES
Virtual Regular Visit      Assessment/Plan:    Problem List Items Addressed This Visit     None      Visit Diagnoses     Fever, unspecified fever cause    -  Primary    Hematuria, unspecified type            Given his complaints and symptoms, I do think he should go to an urgent care for an evaluation and urine dip with his history  He was advised of this and is aware to do so  Reason for visit is   Chief Complaint   Patient presents with    Virtual Regular Visit        Encounter provider Zuleyka Garcia DO    Provider located at [address] [place] [address] Dr [place] [id number]      Recent Visits  No visits were found meeting these conditions  Showing recent visits within past 7 days and meeting all other requirements     Today's Visits  Date Type Provider Dept   09/30/20 Telemedicine Zuleyka Garcia, [address] [facility]   Showing today's visits and meeting all other requirements     Future Appointments  No visits were found meeting these conditions  Showing future appointments within next 150 days and meeting all other requirements        The patient was identified by name and date of birth  Stephanie Cardenas was informed that this is a telemedicine visit and that the visit is being conducted through [facility] and patient was informed that this is not a secure, HIPAA-complaint platform  He agrees to proceed     My office door was closed  No one else was in the room  He acknowledged consent and understanding of privacy and security of the video platform  The patient has agreed to participate and understands they can discontinue the visit at any time  Patient is aware this is a billable service  Subjective  Stephanie Cardenas is a 70 y o  male being seen for below   Sebastian Needle is being seen virtually secondary to concerns with fever, chills, and nausea  This started on Monday  Reports temps can range from    Reports no significant cough or URI symptoms  He recently underwent a lithotripsy and then had urinary retention  Dawson was removed on Monday  He also noted some hematuria today  Past Medical History:   Diagnosis Date    Hiatal hernia     Hyperlipidemia     Hypertension     Kidney stone     Left inguinal hernia     Lyme disease     last assessed 7/10/2017    Peripheral neuropathy        Past Surgical History:   Procedure Laterality Date    ABDOMINAL SURGERY      CATARACT EXTRACTION      COLONOSCOPY      last assessed 6/17/2015    DUPUYTREN / PALMAR FASCIOTOMY      last assessed 6/17/2015    DUPUYTREN CONTRACTURE RELEASE Left     ESOPHAGOGASTRODUODENOSCOPY      HERNIA REPAIR Right     inguinal    HERNIA REPAIR Left 2/9/2017    Procedure: REPAIR HERNIA INGUINAL, LAPAROSCOPIC WITH MESH;  Surgeon: Radha Gustafson MD;  [facility];  [facility]    [facility]    with mesh done at [facility]      hip replacement    LITHOTRIPSY      SC COLONOSCOPY FLX DX W/COLLJ [place] 1978 PFRMD N/A 10/31/2018    Procedure: EGD AND COLONOSCOPY;  Surgeon: Kalin Yun MD;  Location: [facility] GI LAB;   Service: Gastroenterology    SC CYSTO/URETERO W/LITHOTRIPSY &INDWELL STENT INSRT Left 9/22/2020    Procedure: CYSTOSCOPY URETEROSCOPY WITH LITHOTRIPSY HOLMIUM LASER, RETROGRADE PYELOGRAM AND INSERTION STENT URETERAL;  Surgeon: Alonso Mercado MD;  Location: [facility] MAIN OR;  Service: Urology    TONSILLECTOMY      TOTAL HIP ARTHROPLASTY Right     last assessed 12/5/2014       Current Outpatient Medications   Medication Sig Dispense Refill    allopurinol (ZYLOPRIM) 300 mg tablet Take 1 tablet (300 mg total) by mouth daily 90 tablet 3    amLODIPine (NORVASC) 10 mg tablet Take 1 tablet (10 mg total) by mouth daily 90 tablet 1    amoxicillin (AMOXIL) 500 mg capsule Take 500 mg by mouth once as needed 4 caps before dental work      atorvastatin (LIPITOR) 10 mg tablet Take 1 tablet (10 mg total) by mouth daily 90 tablet 3    gabapentin (NEURONTIN) 300 mg capsule Take 300 mg by mouth daily at bedtime      hydrochlorothiazide (HYDRODIURIL) 25 mg tablet Take 1 tablet (25 mg total) by mouth daily 90 tablet 3    losartan (COZAAR) 100 MG tablet Take 1 tablet (100 mg total) by mouth daily 90 tablet 3    metroNIDAZOLE (METROGEL) 1 % gel Apply topically daily 60 g 2    naproxen (NAPROSYN) 250 mg tablet Take 250 mg by mouth 2 (two) times a day as needed for mild pain      omeprazole (PriLOSEC) 20 mg delayed release capsule Take 1 capsule (20 mg total) by mouth daily      oxymetazoline (AFRIN) 0 05 % nasal spray 2 sprays by Each Nare route 2 (two) times a day      tamsulosin (FLOMAX) 0 4 mg TAKE 1 CAPSULE (0 4 MG TOTAL) BY MOUTH DAILY AT BEDTIME 90 capsule 3    timolol (TIMOPTIC) 0 5 % ophthalmic solution Administer 1 drop to both eyes 2 (two) times a day   3    ondansetron (ZOFRAN-ODT) 4 mg disintegrating tablet Take 1 tablet (4 mg total) by mouth every 8 (eight) hours as needed for nausea or vomiting for up to 5 days 15 tablet 0     No current facility-administered medications for this visit  No Known Allergies    Review of Systems   Constitutional: Positive for chills and fever  HENT: Negative for sinus pressure and sinus pain  Respiratory: Negative for cough and shortness of breath  Cardiovascular: Negative for chest pain  Genitourinary: Positive for difficulty urinating and hematuria  Negative for dysuria, flank pain and frequency  Video Exam    Vitals:    09/30/20 1442   Temp: 99 2 °F (37 3 °C)   TempSrc: Oral   Weight: 68 kg (150 lb)       Physical Exam  Constitutional:       Appearance: Normal appearance  Pulmonary:      Effort: Pulmonary effort is normal       Breath sounds: No stridor  Neurological:      Mental Status: He is alert and oriented to person, place, and time  Mental status is at baseline     Psychiatric:         Mood and Affect: Mood normal  Behavior: Behavior normal          Thought Content: Thought content normal          Judgment: Judgment normal           I spent 10 minutes directly with the patient during this visit      VIRTUAL VISIT DISCLAIMER    Cathie Cortez acknowledges that he has consented to an online visit or consultation  He understands that the online visit is based solely on information provided by him, and that, in the absence of a face-to-face physical evaluation by the physician, the diagnosis he receives is both limited and provisional in terms of accuracy and completeness  This is not intended to replace a full medical face-to-face evaluation by the physician  Cathie Cortez understands and accepts these terms

## 2020-09-30 NOTE — TELEPHONE ENCOUNTER
Call placed to patient and spoke with him  Informed patient that given the symptoms he is having he should proceed to the nearest ER or Urgent Care at this time  Pt began to argue with me over the phone stating that his PCP told him to contact Urology because the symptoms he is having could be related to him having the stent in place  Informed patient that since he has a fever and cough we are unable to see him in the office at this time and he should proceed to the ER or Urgent care for assessment  Pt stated that he would like Dr Nakita Lovelace to be aware of his symptoms and if he has any recommendations to contact him  I informed patient that Dr Nakita Lovelace is seeing patient's today and will get back to me when he can, but in the meantime he should proceed to ER or Urgent care for the symptoms he is having  He verbalized understanding of this but wanted Dr Nakita Lovelace to be aware  Message will be sent to provider at this time

## 2020-09-30 NOTE — PROGRESS NOTES
[facility]      NAME: Criss Sauceda is a 70 y o  male  : 1945    MRN: [id number]  DATE: 2020  TIME: 5:50 PM    Assessment and Plan   Fever, unspecified fever cause [R50 9]  1  Fever, unspecified fever cause  POCT urine dip    Urine culture         Patient Instructions       Follow up with PCP in 3-5 days  Proceed to  ER if symptoms worsen  Chief Complaint     Chief Complaint   Patient presents with    Fever     Patient relates started with a fever 101 0 max today  Yesterday vomited a couple of times and once today  Denies diarrhea  Denies chest pain and shortness of breath  Seen in the ER on  and had surgery for a kidney stone   had a folely catheter placed due to urinary retention  aaron d/c on monday  C/O dizziness, nausea and chills           History of Present Illness       HPI    Review of Systems   Review of Systems      Current Medications       Current Outpatient Medications:     allopurinol (ZYLOPRIM) 300 mg tablet, Take 1 tablet (300 mg total) by mouth daily, Disp: 90 tablet, Rfl: 3    amLODIPine (NORVASC) 10 mg tablet, Take 1 tablet (10 mg total) by mouth daily, Disp: 90 tablet, Rfl: 1    amoxicillin (AMOXIL) 500 mg capsule, Take 500 mg by mouth once as needed 4 caps before dental work, Disp: , Rfl:     atorvastatin (LIPITOR) 10 mg tablet, Take 1 tablet (10 mg total) by mouth daily, Disp: 90 tablet, Rfl: 3    gabapentin (NEURONTIN) 300 mg capsule, Take 300 mg by mouth daily at bedtime, Disp: , Rfl:     hydrochlorothiazide (HYDRODIURIL) 25 mg tablet, Take 1 tablet (25 mg total) by mouth daily, Disp: 90 tablet, Rfl: 3    losartan (COZAAR) 100 MG tablet, Take 1 tablet (100 mg total) by mouth daily, Disp: 90 tablet, Rfl: 3    metroNIDAZOLE (METROGEL) 1 % gel, Apply topically daily, Disp: 60 g, Rfl: 2    omeprazole (PriLOSEC) 20 mg delayed release capsule, Take 1 capsule (20 mg total) by mouth daily, Disp: , Rfl:     oxymetazoline (AFRIN) 0 05 % nasal spray, 2 sprays by Each Nare route 2 (two) times a day, Disp: , Rfl:     tamsulosin (FLOMAX) 0 4 mg, TAKE 1 CAPSULE (0 4 MG TOTAL) BY MOUTH DAILY AT BEDTIME, Disp: 90 capsule, Rfl: 3    timolol (TIMOPTIC) 0 5 % ophthalmic solution, Administer 1 drop to both eyes 2 (two) times a day , Disp: , Rfl: 3    naproxen (NAPROSYN) 250 mg tablet, Take 250 mg by mouth 2 (two) times a day as needed for mild pain, Disp: , Rfl:     ondansetron (ZOFRAN-ODT) 4 mg disintegrating tablet, Take 1 tablet (4 mg total) by mouth every 8 (eight) hours as needed for nausea or vomiting for up to 5 days, Disp: 15 tablet, Rfl: 0    Current Allergies     Allergies as of 09/30/2020    (No Known Allergies)            The following portions of the patient's history were reviewed and updated as appropriate: allergies, current medications, past family history, past medical history, past social history, past surgical history and problem list      Past Medical History:   Diagnosis Date    Hiatal hernia     Hyperlipidemia     Hypertension     Kidney stone     Left inguinal hernia     Lyme disease     last assessed 7/10/2017    Peripheral neuropathy        Past Surgical History:   Procedure Laterality Date    ABDOMINAL SURGERY      CATARACT EXTRACTION      COLONOSCOPY      last assessed 6/17/2015    DUPUYTREN / PALMAR FASCIOTOMY      last assessed 6/17/2015    DUPUYTREN CONTRACTURE RELEASE Left     ESOPHAGOGASTRODUODENOSCOPY      HERNIA REPAIR Right     inguinal    HERNIA REPAIR Left 2/9/2017    Procedure: REPAIR HERNIA INGUINAL, LAPAROSCOPIC WITH MESH;  Surgeon: Ellen Kearns MD;  [facility];  [facility]    [facility]    with [facility] done at [facility]      hip replacement    LITHOTRIPSY      RI COLONOSCOPY FLX DX W/JUSTIN [place] 1978 PFRMD N/A 10/31/2018    Procedure: EGD AND COLONOSCOPY;  Surgeon: Annie Hernandez MD;  Location: [facility] GI LAB;   Service: Gastroenterology    MI CYSTO/URETERO W/LITHOTRIPSY &INDWELL STENT INSRT Left 9/22/2020    Procedure: CYSTOSCOPY URETEROSCOPY WITH LITHOTRIPSY HOLMIUM LASER, RETROGRADE PYELOGRAM AND INSERTION STENT URETERAL;  Surgeon: Ash Low MD;  Location: [address] [place] [address];  Service: Urology    TONSILLECTOMY      TOTAL HIP ARTHROPLASTY Right     last assessed 12/5/2014       Family History   Problem Relation Age of Onset    Coronary artery disease Mother     Cancer Father          Medications have been verified          Objective   BP (!) 83/55   Pulse 90   Temp (!) 96 6 °F (35 9 °C) (Tympanic)   Resp 16   Ht 5' 7" (1 702 m)   Wt 69 1 kg (152 lb 5 4 oz)   SpO2 95%   BMI 23 86 kg/m²        Physical Exam     Physical Exam

## 2020-09-30 NOTE — PROGRESS NOTES
[facility]      NAME: Jelly Buchanan is a 70 y o  male  : 1945    MRN: [id number]  DATE: 2020  TIME: 6:08 PM    Assessment and Plan   Hypotension, unspecified hypotension type [I95 9]  1  Hypotension, unspecified hypotension type  Transfer to other facility   2  Fever, unspecified fever cause  POCT urine dip    CANCELED: Urine culture     POCT urine dip   Order: [id number]   Status:  Final result   Visible to patient:  No (not released)   Next appt:  10/01/2020 at 09:30 AM in Urology ([address] [place] [address])   Dx:  Fever, unspecified fever cause   Component  20 1740   LEUKOCYTE ESTERASE,UA  large    NITRITE,UA  neg    SL AMB POCT UROBILINOGEN  0 2    POCT URINE PROTEIN  2,000     PH,UA  6 0    BLOOD,UA  large hemolyzed    SPECIFIC GRAVITY,UA  1 030    KETONES,UA  neg    BILIRUBIN,UA  neg    GLUCOSE, UA  neg     COLOR,UA  yellow    CLARITY,UA  cloudy                Patient Instructions     Patient sent to ED for further evaluation of potential sepsis    Chief Complaint     Chief Complaint   Patient presents with    Fever     Patient relates started with a fever 101 0 max today  Yesterday vomited a couple of times and once today  Denies diarrhea  Denies chest pain and shortness of breath  Seen in the ER on  and had surgery for a kidney stone   had a folely catheter placed due to urinary retention  aaron d/c on monday  C/O dizziness, nausea and chills  History of Present Illness       Patient is a 21-year-old male presenting the office for dizziness, fever  Patient is status post stent placement for kidney stone  Patient recently had a catheter removed approximately 2 days ago  Patient states that since the catheter is removed he has begun to experience fevers and chills  Patient spoke with family doctor and advised him to come to the urgent care for further assessment of fever  Patient denies any problem with his ears nose or throat    Patient has any shortness of breath chest tightness chest pain  Patient states that dizziness is intermittent and positional based  Patient states that the blurred vision is intermittent in nature  Patient states that he had a documented temperature on Monday  Patient offers no other complaints at this time  Review of Systems   Review of Systems   Constitutional: Positive for fever  Negative for activity change, appetite change, chills, diaphoresis, fatigue and unexpected weight change  HENT: Negative  Eyes: Negative  Respiratory: Negative  Cardiovascular: Negative  Gastrointestinal: Negative  Endocrine: Negative  Genitourinary: Positive for dysuria  Negative for decreased urine volume, difficulty urinating, discharge, enuresis, flank pain, frequency, genital sores, hematuria, penile pain, penile swelling, scrotal swelling, testicular pain and urgency  Musculoskeletal: Negative  Skin: Negative  Allergic/Immunologic: Negative  Neurological: Positive for dizziness  Negative for tremors, seizures, syncope, facial asymmetry, speech difficulty, weakness, light-headedness, numbness and headaches  Hematological: Negative  Psychiatric/Behavioral: Negative            Current Medications       Current Outpatient Medications:     allopurinol (ZYLOPRIM) 300 mg tablet, Take 1 tablet (300 mg total) by mouth daily, Disp: 90 tablet, Rfl: 3    amLODIPine (NORVASC) 10 mg tablet, Take 1 tablet (10 mg total) by mouth daily, Disp: 90 tablet, Rfl: 1    amoxicillin (AMOXIL) 500 mg capsule, Take 500 mg by mouth once as needed 4 caps before dental work, Disp: , Rfl:     atorvastatin (LIPITOR) 10 mg tablet, Take 1 tablet (10 mg total) by mouth daily, Disp: 90 tablet, Rfl: 3    gabapentin (NEURONTIN) 300 mg capsule, Take 300 mg by mouth daily at bedtime, Disp: , Rfl:     hydrochlorothiazide (HYDRODIURIL) 25 mg tablet, Take 1 tablet (25 mg total) by mouth daily, Disp: 90 tablet, Rfl: 3    losartan (COZAAR) 100 MG tablet, Take 1 tablet (100 mg total) by mouth daily, Disp: 90 tablet, Rfl: 3    metroNIDAZOLE (METROGEL) 1 % gel, Apply topically daily, Disp: 60 g, Rfl: 2    omeprazole (PriLOSEC) 20 mg delayed release capsule, Take 1 capsule (20 mg total) by mouth daily, Disp: , Rfl:     oxymetazoline (AFRIN) 0 05 % nasal spray, 2 sprays by Each Nare route 2 (two) times a day, Disp: , Rfl:     tamsulosin (FLOMAX) 0 4 mg, TAKE 1 CAPSULE (0 4 MG TOTAL) BY MOUTH DAILY AT BEDTIME, Disp: 90 capsule, Rfl: 3    timolol (TIMOPTIC) 0 5 % ophthalmic solution, Administer 1 drop to both eyes 2 (two) times a day , Disp: , Rfl: 3    naproxen (NAPROSYN) 250 mg tablet, Take 250 mg by mouth 2 (two) times a day as needed for mild pain, Disp: , Rfl:     ondansetron (ZOFRAN-ODT) 4 mg disintegrating tablet, Take 1 tablet (4 mg total) by mouth every 8 (eight) hours as needed for nausea or vomiting for up to 5 days, Disp: 15 tablet, Rfl: 0    Current Allergies     Allergies as of 09/30/2020    (No Known Allergies)            The following portions of the patient's history were reviewed and updated as appropriate: allergies, current medications, past family history, past medical history, past social history, past surgical history and problem list      Past Medical History:   Diagnosis Date    Hiatal hernia     Hyperlipidemia     Hypertension     Kidney stone     Left inguinal hernia     Lyme disease     last assessed 7/10/2017    Peripheral neuropathy        Past Surgical History:   Procedure Laterality Date    ABDOMINAL SURGERY      CATARACT EXTRACTION      COLONOSCOPY      last assessed 6/17/2015    DUPUYTREN / PALMAR FASCIOTOMY      last assessed 6/17/2015    DUPUYTREN CONTRACTURE RELEASE Left     ESOPHAGOGASTRODUODENOSCOPY      HERNIA REPAIR Right     inguinal    HERNIA REPAIR Left 2/9/2017    Procedure: REPAIR HERNIA INGUINAL, LAPAROSCOPIC WITH MESH;  Surgeon: Vesta Kawasaki, MD;  Location: [facility];  Service:     INGUINAL HERNIA REPAIR Bilateral 2011    with mesh done at [facility]      hip replacement    LITHOTRIPSY      MA COLONOSCOPY FLX DX W/COLLJ SPEC WHEN PFRMD N/A 10/31/2018    Procedure: EGD AND COLONOSCOPY;  Surgeon: Aba Ayon MD;  Location: [facility] LAB; Service: Gastroenterology    MA CYSTO/URETERO W/LITHOTRIPSY &INDWELL STENT INSRT Left 9/22/2020    Procedure: CYSTOSCOPY URETEROSCOPY WITH LITHOTRIPSY HOLMIUM LASER, RETROGRADE PYELOGRAM AND INSERTION STENT URETERAL;  Surgeon: Corrie Minor MD;  Location: [facility] MAIN OR;  Service: Urology    TONSILLECTOMY      TOTAL HIP ARTHROPLASTY Right     last assessed 12/5/2014       Family History   Problem Relation Age of Onset    Coronary artery disease Mother     Cancer Father          Medications have been verified  Objective   BP (!) 83/55   Pulse 90   Temp (!) 96 6 °F (35 9 °C) (Tympanic)   Resp 16   Ht 5' 7" (1 702 m)   Wt 69 1 kg (152 lb 5 4 oz)   SpO2 95%   BMI 23 86 kg/m²        Physical Exam     Physical Exam  Vitals signs and nursing note reviewed  Constitutional:       Appearance: Normal appearance  He is well-developed and normal weight  HENT:      Head: Normocephalic  Right Ear: Tympanic membrane, ear canal and external ear normal       Left Ear: Tympanic membrane, ear canal and external ear normal       Nose: Nose normal       Mouth/Throat:      Mouth: Mucous membranes are moist       Pharynx: Oropharynx is clear  Eyes:      Conjunctiva/sclera: Conjunctivae normal       Pupils: Pupils are equal, round, and reactive to light  Neck:      Musculoskeletal: Normal range of motion  Cardiovascular:      Rate and Rhythm: Normal rate and regular rhythm  Heart sounds: Normal heart sounds  Pulmonary:      Effort: Pulmonary effort is normal       Breath sounds: Normal breath sounds  Abdominal:      General: Abdomen is flat  Bowel sounds are normal    Skin:     General: Skin is warm        Capillary Refill: Capillary refill takes less than 2 seconds  Neurological:      General: No focal deficit present  Mental Status: He is alert and oriented to person, place, and time  Mental status is at baseline  Psychiatric:         Behavior: Behavior normal          Thought Content:  Thought content normal          Judgment: Judgment normal

## 2020-09-30 NOTE — TELEPHONE ENCOUNTER
Patient called in stating since his catheter removal and kidney stone removal his is experiencing fever (101 1), Chills, nausea and he states his legs are aching a lot  Patient stated he did call his pcp and the answering service informed him to contact urology   Patient can be reached at  [phone]

## 2020-10-01 PROBLEM — N17.9 ACUTE KIDNEY INJURY (HCC): Status: ACTIVE | Noted: 2020-10-01

## 2020-10-01 PROBLEM — A41.9 SEPSIS (HCC): Status: ACTIVE | Noted: 2020-10-01

## 2020-10-01 PROBLEM — N30.00 ACUTE CYSTITIS: Status: ACTIVE | Noted: 2020-10-01

## 2020-10-01 LAB
ANION GAP SERPL CALCULATED.3IONS-SCNC: 15 MMOL/L (ref 4–13)
ANISOCYTOSIS BLD QL SMEAR: PRESENT
ATRIAL RATE: 77 BPM
BASOPHILS # BLD MANUAL: 0 THOUSAND/UL (ref 0–0.1)
BASOPHILS NFR MAR MANUAL: 0 % (ref 0–1)
BUN SERPL-MCNC: 48 MG/DL (ref 5–25)
CA-I BLD-SCNC: 1.05 MMOL/L (ref 1.12–1.32)
CALCIUM SERPL-MCNC: 8.3 MG/DL (ref 8.3–10.1)
CHLORIDE SERPL-SCNC: 100 MMOL/L (ref 100–108)
CO2 SERPL-SCNC: 20 MMOL/L (ref 21–32)
CREAT SERPL-MCNC: 3.04 MG/DL (ref 0.6–1.3)
EOSINOPHIL # BLD MANUAL: 0.14 THOUSAND/UL (ref 0–0.4)
EOSINOPHIL NFR BLD MANUAL: 1 % (ref 0–6)
ERYTHROCYTE [DISTWIDTH] IN BLOOD BY AUTOMATED COUNT: 14.4 % (ref 11.6–15.1)
GFR SERPL CREATININE-BSD FRML MDRD: 19 ML/MIN/1.73SQ M
GLUCOSE SERPL-MCNC: 107 MG/DL (ref 65–140)
HCT VFR BLD AUTO: 44.3 % (ref 36.5–49.3)
HGB BLD-MCNC: 14.1 G/DL (ref 12–17)
LACTATE SERPL-SCNC: 1.5 MMOL/L (ref 0.5–2)
LG PLATELETS BLD QL SMEAR: PRESENT
LYMPHOCYTES # BLD AUTO: 0.68 THOUSAND/UL (ref 0.6–4.47)
LYMPHOCYTES # BLD AUTO: 5 % (ref 14–44)
MAGNESIUM SERPL-MCNC: 2 MG/DL (ref 1.6–2.6)
MAGNESIUM SERPL-MCNC: 2 MG/DL (ref 1.6–2.6)
MCH RBC QN AUTO: 30.2 PG (ref 26.8–34.3)
MCHC RBC AUTO-ENTMCNC: 31.8 G/DL (ref 31.4–37.4)
MCV RBC AUTO: 95 FL (ref 82–98)
MONOCYTES # BLD AUTO: 0.68 THOUSAND/UL (ref 0–1.22)
MONOCYTES NFR BLD: 5 % (ref 4–12)
NEUTROPHILS # BLD MANUAL: 12.14 THOUSAND/UL (ref 1.85–7.62)
NEUTS BAND NFR BLD MANUAL: 17 % (ref 0–8)
NEUTS SEG NFR BLD AUTO: 72 % (ref 43–75)
NRBC BLD AUTO-RTO: 0 /100 WBCS
P AXIS: 73 DEGREES
PHOSPHATE SERPL-MCNC: 3.6 MG/DL (ref 2.3–4.1)
PHOSPHATE SERPL-MCNC: 3.6 MG/DL (ref 2.3–4.1)
PLATELET # BLD AUTO: 168 THOUSANDS/UL (ref 149–390)
PLATELET BLD QL SMEAR: ADEQUATE
PMV BLD AUTO: 11 FL (ref 8.9–12.7)
POTASSIUM SERPL-SCNC: 3.3 MMOL/L (ref 3.5–5.3)
PR INTERVAL: 168 MS
PROCALCITONIN SERPL-MCNC: 74.63 NG/ML
QRS AXIS: 72 DEGREES
QRSD INTERVAL: 86 MS
QT INTERVAL: 360 MS
QTC INTERVAL: 407 MS
RBC # BLD AUTO: 4.67 MILLION/UL (ref 3.88–5.62)
SODIUM SERPL-SCNC: 135 MMOL/L (ref 136–145)
T WAVE AXIS: 56 DEGREES
TOTAL CELLS COUNTED SPEC: 100
VENTRICULAR RATE: 77 BPM
WBC # BLD AUTO: 13.64 THOUSAND/UL (ref 4.31–10.16)

## 2020-10-01 PROCEDURE — 84100 ASSAY OF PHOSPHORUS: CPT | Performed by: NURSE PRACTITIONER

## 2020-10-01 PROCEDURE — 80048 BASIC METABOLIC PNL TOTAL CA: CPT | Performed by: NURSE PRACTITIONER

## 2020-10-01 PROCEDURE — 85007 BL SMEAR W/DIFF WBC COUNT: CPT | Performed by: NURSE PRACTITIONER

## 2020-10-01 PROCEDURE — 99223 1ST HOSP IP/OBS HIGH 75: CPT | Performed by: PHYSICIAN ASSISTANT

## 2020-10-01 PROCEDURE — 83735 ASSAY OF MAGNESIUM: CPT | Performed by: NURSE PRACTITIONER

## 2020-10-01 PROCEDURE — 99223 1ST HOSP IP/OBS HIGH 75: CPT | Performed by: INTERNAL MEDICINE

## 2020-10-01 PROCEDURE — 93010 ELECTROCARDIOGRAM REPORT: CPT | Performed by: INTERNAL MEDICINE

## 2020-10-01 PROCEDURE — 84145 PROCALCITONIN (PCT): CPT | Performed by: NURSE PRACTITIONER

## 2020-10-01 PROCEDURE — 85027 COMPLETE CBC AUTOMATED: CPT | Performed by: NURSE PRACTITIONER

## 2020-10-01 PROCEDURE — 82330 ASSAY OF CALCIUM: CPT | Performed by: NURSE PRACTITIONER

## 2020-10-01 RX ORDER — PANTOPRAZOLE SODIUM 40 MG/1
40 TABLET, DELAYED RELEASE ORAL
Status: DISCONTINUED | OUTPATIENT
Start: 2020-10-01 | End: 2020-10-04 | Stop reason: HOSPADM

## 2020-10-01 RX ORDER — TIMOLOL MALEATE 5 MG/ML
1 SOLUTION/ DROPS OPHTHALMIC 2 TIMES DAILY
Status: DISCONTINUED | OUTPATIENT
Start: 2020-10-01 | End: 2020-10-04 | Stop reason: HOSPADM

## 2020-10-01 RX ORDER — ACETAMINOPHEN 325 MG/1
650 TABLET ORAL EVERY 6 HOURS PRN
Status: DISCONTINUED | OUTPATIENT
Start: 2020-10-01 | End: 2020-10-04 | Stop reason: HOSPADM

## 2020-10-01 RX ORDER — HEPARIN SODIUM 5000 [USP'U]/ML
5000 INJECTION, SOLUTION INTRAVENOUS; SUBCUTANEOUS EVERY 8 HOURS SCHEDULED
Status: DISCONTINUED | OUTPATIENT
Start: 2020-10-01 | End: 2020-10-04 | Stop reason: HOSPADM

## 2020-10-01 RX ORDER — SODIUM CHLORIDE 9 MG/ML
75 INJECTION, SOLUTION INTRAVENOUS CONTINUOUS
Status: DISCONTINUED | OUTPATIENT
Start: 2020-10-01 | End: 2020-10-03

## 2020-10-01 RX ORDER — TAMSULOSIN HYDROCHLORIDE 0.4 MG/1
0.4 CAPSULE ORAL
Status: DISCONTINUED | OUTPATIENT
Start: 2020-10-01 | End: 2020-10-04 | Stop reason: HOSPADM

## 2020-10-01 RX ORDER — ATORVASTATIN CALCIUM 10 MG/1
10 TABLET, FILM COATED ORAL DAILY
Status: DISCONTINUED | OUTPATIENT
Start: 2020-10-01 | End: 2020-10-04 | Stop reason: HOSPADM

## 2020-10-01 RX ORDER — ONDANSETRON 2 MG/ML
4 INJECTION INTRAMUSCULAR; INTRAVENOUS EVERY 6 HOURS PRN
Status: DISCONTINUED | OUTPATIENT
Start: 2020-10-01 | End: 2020-10-04 | Stop reason: HOSPADM

## 2020-10-01 RX ORDER — GABAPENTIN 300 MG/1
300 CAPSULE ORAL
Status: DISCONTINUED | OUTPATIENT
Start: 2020-10-01 | End: 2020-10-04 | Stop reason: HOSPADM

## 2020-10-01 RX ORDER — DIPHENHYDRAMINE HCL 25 MG
12.5 TABLET ORAL ONCE
Status: COMPLETED | OUTPATIENT
Start: 2020-10-01 | End: 2020-10-01

## 2020-10-01 RX ADMIN — CEFTRIAXONE 1000 MG: 1 INJECTION, POWDER, FOR SOLUTION INTRAMUSCULAR; INTRAVENOUS at 20:36

## 2020-10-01 RX ADMIN — SODIUM CHLORIDE 75 ML/HR: 0.9 INJECTION, SOLUTION INTRAVENOUS at 06:30

## 2020-10-01 RX ADMIN — PANTOPRAZOLE SODIUM 40 MG: 40 TABLET, DELAYED RELEASE ORAL at 06:02

## 2020-10-01 RX ADMIN — SODIUM CHLORIDE 75 ML/HR: 0.9 INJECTION, SOLUTION INTRAVENOUS at 01:15

## 2020-10-01 RX ADMIN — GABAPENTIN 300 MG: 300 CAPSULE ORAL at 01:17

## 2020-10-01 RX ADMIN — SODIUM CHLORIDE 1000 ML: 0.9 INJECTION, SOLUTION INTRAVENOUS at 04:45

## 2020-10-01 RX ADMIN — GABAPENTIN 300 MG: 300 CAPSULE ORAL at 21:25

## 2020-10-01 RX ADMIN — HEPARIN SODIUM 5000 UNITS: 5000 INJECTION INTRAVENOUS; SUBCUTANEOUS at 21:25

## 2020-10-01 RX ADMIN — TAMSULOSIN HYDROCHLORIDE 0.4 MG: 0.4 CAPSULE ORAL at 21:25

## 2020-10-01 RX ADMIN — ATORVASTATIN CALCIUM 10 MG: 10 TABLET, FILM COATED ORAL at 14:17

## 2020-10-01 RX ADMIN — TIMOLOL MALEATE 1 DROP: 5 SOLUTION/ DROPS OPHTHALMIC at 18:25

## 2020-10-01 RX ADMIN — HEPARIN SODIUM 5000 UNITS: 5000 INJECTION INTRAVENOUS; SUBCUTANEOUS at 01:17

## 2020-10-01 RX ADMIN — ACETAMINOPHEN 650 MG: 325 TABLET ORAL at 07:49

## 2020-10-01 RX ADMIN — ACETAMINOPHEN 650 MG: 325 TABLET ORAL at 16:53

## 2020-10-01 RX ADMIN — DIPHENHYDRAMINE HCL 12.5 MG: 25 TABLET ORAL at 01:16

## 2020-10-01 RX ADMIN — TAMSULOSIN HYDROCHLORIDE 0.4 MG: 0.4 CAPSULE ORAL at 01:17

## 2020-10-02 ENCOUNTER — TELEPHONE (OUTPATIENT)
Dept: OTHER | Facility: HOSPITAL | Age: 75
End: 2020-10-02

## 2020-10-02 LAB
ANION GAP SERPL CALCULATED.3IONS-SCNC: 7 MMOL/L (ref 4–13)
BUN SERPL-MCNC: 32 MG/DL (ref 5–25)
CALCIUM SERPL-MCNC: 8.4 MG/DL (ref 8.3–10.1)
CHLORIDE SERPL-SCNC: 102 MMOL/L (ref 100–108)
CO2 SERPL-SCNC: 26 MMOL/L (ref 21–32)
CREAT SERPL-MCNC: 1.57 MG/DL (ref 0.6–1.3)
ERYTHROCYTE [DISTWIDTH] IN BLOOD BY AUTOMATED COUNT: 14.2 % (ref 11.6–15.1)
GFR SERPL CREATININE-BSD FRML MDRD: 43 ML/MIN/1.73SQ M
GLUCOSE SERPL-MCNC: 105 MG/DL (ref 65–140)
HCT VFR BLD AUTO: 43.7 % (ref 36.5–49.3)
HGB BLD-MCNC: 14.3 G/DL (ref 12–17)
MCH RBC QN AUTO: 30.3 PG (ref 26.8–34.3)
MCHC RBC AUTO-ENTMCNC: 32.7 G/DL (ref 31.4–37.4)
MCV RBC AUTO: 93 FL (ref 82–98)
PLATELET # BLD AUTO: 183 THOUSANDS/UL (ref 149–390)
PMV BLD AUTO: 10.6 FL (ref 8.9–12.7)
POTASSIUM SERPL-SCNC: 3.8 MMOL/L (ref 3.5–5.3)
PROCALCITONIN SERPL-MCNC: 29.54 NG/ML
RBC # BLD AUTO: 4.72 MILLION/UL (ref 3.88–5.62)
SODIUM SERPL-SCNC: 135 MMOL/L (ref 136–145)
WBC # BLD AUTO: 8.74 THOUSAND/UL (ref 4.31–10.16)

## 2020-10-02 PROCEDURE — 99232 SBSQ HOSP IP/OBS MODERATE 35: CPT | Performed by: INTERNAL MEDICINE

## 2020-10-02 PROCEDURE — 99232 SBSQ HOSP IP/OBS MODERATE 35: CPT | Performed by: UROLOGY

## 2020-10-02 PROCEDURE — 80048 BASIC METABOLIC PNL TOTAL CA: CPT | Performed by: NURSE PRACTITIONER

## 2020-10-02 PROCEDURE — 84145 PROCALCITONIN (PCT): CPT | Performed by: NURSE PRACTITIONER

## 2020-10-02 PROCEDURE — 85027 COMPLETE CBC AUTOMATED: CPT | Performed by: NURSE PRACTITIONER

## 2020-10-02 RX ADMIN — CEFTRIAXONE 1000 MG: 1 INJECTION, POWDER, FOR SOLUTION INTRAMUSCULAR; INTRAVENOUS at 20:07

## 2020-10-02 RX ADMIN — HEPARIN SODIUM 5000 UNITS: 5000 INJECTION INTRAVENOUS; SUBCUTANEOUS at 13:39

## 2020-10-02 RX ADMIN — SODIUM CHLORIDE 75 ML/HR: 0.9 INJECTION, SOLUTION INTRAVENOUS at 02:35

## 2020-10-02 RX ADMIN — PANTOPRAZOLE SODIUM 40 MG: 40 TABLET, DELAYED RELEASE ORAL at 05:03

## 2020-10-02 RX ADMIN — GABAPENTIN 300 MG: 300 CAPSULE ORAL at 21:12

## 2020-10-02 RX ADMIN — HEPARIN SODIUM 5000 UNITS: 5000 INJECTION INTRAVENOUS; SUBCUTANEOUS at 05:03

## 2020-10-02 RX ADMIN — HEPARIN SODIUM 5000 UNITS: 5000 INJECTION INTRAVENOUS; SUBCUTANEOUS at 21:12

## 2020-10-02 RX ADMIN — ATORVASTATIN CALCIUM 10 MG: 10 TABLET, FILM COATED ORAL at 09:16

## 2020-10-02 RX ADMIN — TIMOLOL MALEATE 1 DROP: 5 SOLUTION/ DROPS OPHTHALMIC at 17:54

## 2020-10-02 RX ADMIN — TAMSULOSIN HYDROCHLORIDE 0.4 MG: 0.4 CAPSULE ORAL at 21:12

## 2020-10-02 RX ADMIN — TIMOLOL MALEATE 1 DROP: 5 SOLUTION/ DROPS OPHTHALMIC at 09:16

## 2020-10-02 RX ADMIN — ACETAMINOPHEN 650 MG: 325 TABLET ORAL at 20:45

## 2020-10-03 LAB
ANION GAP SERPL CALCULATED.3IONS-SCNC: 8 MMOL/L (ref 4–13)
BACTERIA UR CULT: ABNORMAL
BACTERIA UR CULT: ABNORMAL
BUN SERPL-MCNC: 21 MG/DL (ref 5–25)
CALCIUM SERPL-MCNC: 8.1 MG/DL (ref 8.3–10.1)
CHLORIDE SERPL-SCNC: 106 MMOL/L (ref 100–108)
CO2 SERPL-SCNC: 26 MMOL/L (ref 21–32)
CREAT SERPL-MCNC: 1.21 MG/DL (ref 0.6–1.3)
GFR SERPL CREATININE-BSD FRML MDRD: 59 ML/MIN/1.73SQ M
GLUCOSE SERPL-MCNC: 98 MG/DL (ref 65–140)
POTASSIUM SERPL-SCNC: 3.3 MMOL/L (ref 3.5–5.3)
SODIUM SERPL-SCNC: 140 MMOL/L (ref 136–145)

## 2020-10-03 PROCEDURE — 80048 BASIC METABOLIC PNL TOTAL CA: CPT | Performed by: INTERNAL MEDICINE

## 2020-10-03 PROCEDURE — 99232 SBSQ HOSP IP/OBS MODERATE 35: CPT | Performed by: INTERNAL MEDICINE

## 2020-10-03 RX ORDER — POTASSIUM CHLORIDE 20 MEQ/1
40 TABLET, EXTENDED RELEASE ORAL ONCE
Status: COMPLETED | OUTPATIENT
Start: 2020-10-03 | End: 2020-10-03

## 2020-10-03 RX ADMIN — CEFTRIAXONE 1000 MG: 1 INJECTION, POWDER, FOR SOLUTION INTRAMUSCULAR; INTRAVENOUS at 21:00

## 2020-10-03 RX ADMIN — SODIUM CHLORIDE 75 ML/HR: 0.9 INJECTION, SOLUTION INTRAVENOUS at 04:55

## 2020-10-03 RX ADMIN — HEPARIN SODIUM 5000 UNITS: 5000 INJECTION INTRAVENOUS; SUBCUTANEOUS at 05:00

## 2020-10-03 RX ADMIN — TIMOLOL MALEATE 1 DROP: 5 SOLUTION/ DROPS OPHTHALMIC at 17:32

## 2020-10-03 RX ADMIN — HEPARIN SODIUM 5000 UNITS: 5000 INJECTION INTRAVENOUS; SUBCUTANEOUS at 14:42

## 2020-10-03 RX ADMIN — PANTOPRAZOLE SODIUM 40 MG: 40 TABLET, DELAYED RELEASE ORAL at 05:00

## 2020-10-03 RX ADMIN — TAMSULOSIN HYDROCHLORIDE 0.4 MG: 0.4 CAPSULE ORAL at 21:20

## 2020-10-03 RX ADMIN — TIMOLOL MALEATE 1 DROP: 5 SOLUTION/ DROPS OPHTHALMIC at 10:29

## 2020-10-03 RX ADMIN — ACETAMINOPHEN 650 MG: 325 TABLET ORAL at 17:32

## 2020-10-03 RX ADMIN — GABAPENTIN 300 MG: 300 CAPSULE ORAL at 21:20

## 2020-10-03 RX ADMIN — HEPARIN SODIUM 5000 UNITS: 5000 INJECTION INTRAVENOUS; SUBCUTANEOUS at 21:20

## 2020-10-03 RX ADMIN — POTASSIUM CHLORIDE 40 MEQ: 1500 TABLET, EXTENDED RELEASE ORAL at 12:23

## 2020-10-03 RX ADMIN — ATORVASTATIN CALCIUM 10 MG: 10 TABLET, FILM COATED ORAL at 10:29

## 2020-10-04 VITALS
HEART RATE: 69 BPM | OXYGEN SATURATION: 96 % | TEMPERATURE: 99.2 F | SYSTOLIC BLOOD PRESSURE: 150 MMHG | RESPIRATION RATE: 18 BRPM | BODY MASS INDEX: 25.26 KG/M2 | HEIGHT: 67 IN | WEIGHT: 160.94 LBS | DIASTOLIC BLOOD PRESSURE: 88 MMHG

## 2020-10-04 LAB
ANION GAP SERPL CALCULATED.3IONS-SCNC: 10 MMOL/L (ref 4–13)
BUN SERPL-MCNC: 19 MG/DL (ref 5–25)
CALCIUM SERPL-MCNC: 8.7 MG/DL (ref 8.3–10.1)
CHLORIDE SERPL-SCNC: 106 MMOL/L (ref 100–108)
CO2 SERPL-SCNC: 26 MMOL/L (ref 21–32)
CREAT SERPL-MCNC: 1.08 MG/DL (ref 0.6–1.3)
GFR SERPL CREATININE-BSD FRML MDRD: 68 ML/MIN/1.73SQ M
GLUCOSE SERPL-MCNC: 99 MG/DL (ref 65–140)
POTASSIUM SERPL-SCNC: 4.1 MMOL/L (ref 3.5–5.3)
SODIUM SERPL-SCNC: 142 MMOL/L (ref 136–145)

## 2020-10-04 PROCEDURE — G0008 ADMIN INFLUENZA VIRUS VAC: HCPCS | Performed by: INTERNAL MEDICINE

## 2020-10-04 PROCEDURE — 99239 HOSP IP/OBS DSCHRG MGMT >30: CPT | Performed by: INTERNAL MEDICINE

## 2020-10-04 PROCEDURE — 80048 BASIC METABOLIC PNL TOTAL CA: CPT | Performed by: INTERNAL MEDICINE

## 2020-10-04 PROCEDURE — 90662 IIV NO PRSV INCREASED AG IM: CPT | Performed by: INTERNAL MEDICINE

## 2020-10-04 RX ORDER — CIPROFLOXACIN 500 MG/1
500 TABLET, FILM COATED ORAL EVERY 12 HOURS SCHEDULED
Qty: 14 TABLET | Refills: 0 | Status: SHIPPED | OUTPATIENT
Start: 2020-10-04 | End: 2020-10-11

## 2020-10-04 RX ADMIN — HEPARIN SODIUM 5000 UNITS: 5000 INJECTION INTRAVENOUS; SUBCUTANEOUS at 05:24

## 2020-10-04 RX ADMIN — ATORVASTATIN CALCIUM 10 MG: 10 TABLET, FILM COATED ORAL at 08:19

## 2020-10-04 RX ADMIN — TIMOLOL MALEATE 1 DROP: 5 SOLUTION/ DROPS OPHTHALMIC at 08:19

## 2020-10-04 RX ADMIN — PANTOPRAZOLE SODIUM 40 MG: 40 TABLET, DELAYED RELEASE ORAL at 05:23

## 2020-10-04 RX ADMIN — INFLUENZA A VIRUS A/MICHIGAN/45/2015 X-275 (H1N1) ANTIGEN (FORMALDEHYDE INACTIVATED), INFLUENZA A VIRUS A/SINGAPORE/INFIMH-16-0019/2016 IVR-186 (H3N2) ANTIGEN (FORMALDEHYDE INACTIVATED), INFLUENZA B VIRUS B/PHUKET/3073/2013 ANTIGEN (FORMALDEHYDE INACTIVATED), AND INFLUENZA B VIRUS B/MARYLAND/15/2016 BX-69A ANTIGEN (FORMALDEHYDE INACTIVATED) 0.7 ML: 60; 60; 60; 60 INJECTION, SUSPENSION INTRAMUSCULAR at 10:50

## 2020-10-06 LAB
BACTERIA BLD CULT: NORMAL
BACTERIA BLD CULT: NORMAL

## 2020-10-07 ENCOUNTER — PROCEDURE VISIT (OUTPATIENT)
Dept: UROLOGY | Facility: MEDICAL CENTER | Age: 75
End: 2020-10-07
Payer: COMMERCIAL

## 2020-10-07 VITALS
HEIGHT: 67 IN | SYSTOLIC BLOOD PRESSURE: 128 MMHG | BODY MASS INDEX: 25.11 KG/M2 | WEIGHT: 160 LBS | DIASTOLIC BLOOD PRESSURE: 64 MMHG | TEMPERATURE: 98.1 F

## 2020-10-07 DIAGNOSIS — N20.0 RENAL CALCULI: Primary | ICD-10-CM

## 2020-10-07 PROCEDURE — 1111F DSCHRG MED/CURRENT MED MERGE: CPT

## 2020-10-07 PROCEDURE — 3074F SYST BP LT 130 MM HG: CPT

## 2020-10-07 PROCEDURE — 1036F TOBACCO NON-USER: CPT

## 2020-10-07 PROCEDURE — 3078F DIAST BP <80 MM HG: CPT

## 2020-10-07 PROCEDURE — 1160F RVW MEDS BY RX/DR IN RCRD: CPT

## 2020-10-07 PROCEDURE — 99211 OFF/OP EST MAY X REQ PHY/QHP: CPT

## 2020-10-13 DIAGNOSIS — I10 ESSENTIAL HYPERTENSION: ICD-10-CM

## 2020-10-14 RX ORDER — AMLODIPINE BESYLATE 10 MG/1
TABLET ORAL
Qty: 90 TABLET | Refills: 1 | Status: SHIPPED | OUTPATIENT
Start: 2020-10-14

## 2020-10-22 DIAGNOSIS — N40.0 BENIGN PROSTATIC HYPERPLASIA, UNSPECIFIED WHETHER LOWER URINARY TRACT SYMPTOMS PRESENT: ICD-10-CM

## 2020-10-23 RX ORDER — TAMSULOSIN HYDROCHLORIDE 0.4 MG/1
0.4 CAPSULE ORAL
Qty: 90 CAPSULE | Refills: 0 | Status: SHIPPED | OUTPATIENT
Start: 2020-10-23 | End: 2021-01-17

## 2020-10-26 ENCOUNTER — LAB (OUTPATIENT)
Dept: LAB | Facility: HOSPITAL | Age: 75
End: 2020-10-26
Attending: INTERNAL MEDICINE
Payer: COMMERCIAL

## 2020-10-26 DIAGNOSIS — N41.1 PROSTATITIS, CHRONIC: Chronic | ICD-10-CM

## 2020-10-26 LAB
ANION GAP SERPL CALCULATED.3IONS-SCNC: 9 MMOL/L (ref 4–13)
BUN SERPL-MCNC: 23 MG/DL (ref 5–25)
CALCIUM SERPL-MCNC: 9.6 MG/DL (ref 8.3–10.1)
CHLORIDE SERPL-SCNC: 106 MMOL/L (ref 100–108)
CO2 SERPL-SCNC: 30 MMOL/L (ref 21–32)
CREAT SERPL-MCNC: 0.96 MG/DL (ref 0.6–1.3)
GFR SERPL CREATININE-BSD FRML MDRD: 78 ML/MIN/1.73SQ M
GLUCOSE SERPL-MCNC: 116 MG/DL (ref 65–140)
POTASSIUM SERPL-SCNC: 3.9 MMOL/L (ref 3.5–5.3)
SODIUM SERPL-SCNC: 145 MMOL/L (ref 136–145)

## 2020-10-26 PROCEDURE — 80048 BASIC METABOLIC PNL TOTAL CA: CPT

## 2020-10-26 PROCEDURE — 36415 COLL VENOUS BLD VENIPUNCTURE: CPT

## 2020-10-28 ENCOUNTER — APPOINTMENT (OUTPATIENT)
Dept: LAB | Facility: HOSPITAL | Age: 75
End: 2020-10-28
Attending: INTERNAL MEDICINE
Payer: COMMERCIAL

## 2020-10-28 ENCOUNTER — TELEMEDICINE (OUTPATIENT)
Dept: FAMILY MEDICINE CLINIC | Facility: CLINIC | Age: 75
End: 2020-10-28
Payer: COMMERCIAL

## 2020-10-28 VITALS — TEMPERATURE: 96.4 F

## 2020-10-28 DIAGNOSIS — N20.0 NEPHROLITHIASIS: Primary | Chronic | ICD-10-CM

## 2020-10-28 DIAGNOSIS — N20.0 NEPHROLITHIASIS: Chronic | ICD-10-CM

## 2020-10-28 DIAGNOSIS — N39.0 URINARY TRACT INFECTION WITHOUT HEMATURIA, SITE UNSPECIFIED: Primary | ICD-10-CM

## 2020-10-28 DIAGNOSIS — I10 ESSENTIAL HYPERTENSION: Chronic | ICD-10-CM

## 2020-10-28 DIAGNOSIS — N17.9 ACUTE KIDNEY INJURY (HCC): ICD-10-CM

## 2020-10-28 PROBLEM — H53.2 DIPLOPIA: Status: ACTIVE | Noted: 2020-06-17

## 2020-10-28 PROBLEM — H40.1132 PRIMARY OPEN-ANGLE GLAUCOMA, BILATERAL, MODERATE STAGE: Status: ACTIVE | Noted: 2017-06-30

## 2020-10-28 PROBLEM — G60.9 HEREDITARY AND IDIOPATHIC PERIPHERAL NEUROPATHY: Status: ACTIVE | Noted: 2017-06-30

## 2020-10-28 PROBLEM — N30.00 ACUTE CYSTITIS: Status: RESOLVED | Noted: 2020-10-01 | Resolved: 2020-10-28

## 2020-10-28 LAB
BACTERIA UR QL AUTO: ABNORMAL /HPF
BILIRUB UR QL STRIP: ABNORMAL
CLARITY UR: ABNORMAL
COLOR UR: YELLOW
GLUCOSE UR STRIP-MCNC: NEGATIVE MG/DL
HGB UR QL STRIP.AUTO: ABNORMAL
KETONES UR STRIP-MCNC: ABNORMAL MG/DL
LEUKOCYTE ESTERASE UR QL STRIP: ABNORMAL
MUCOUS THREADS UR QL AUTO: ABNORMAL
NITRITE UR QL STRIP: NEGATIVE
NON-SQ EPI CELLS URNS QL MICRO: ABNORMAL /HPF
PH UR STRIP.AUTO: 5.5 [PH]
PROT UR STRIP-MCNC: ABNORMAL MG/DL
RBC #/AREA URNS AUTO: ABNORMAL /HPF
SP GR UR STRIP.AUTO: 1.02 (ref 1–1.03)
UROBILINOGEN UR QL STRIP.AUTO: 0.2 E.U./DL
WBC #/AREA URNS AUTO: ABNORMAL /HPF

## 2020-10-28 PROCEDURE — 87086 URINE CULTURE/COLONY COUNT: CPT

## 2020-10-28 PROCEDURE — 99214 OFFICE O/P EST MOD 30 MIN: CPT | Performed by: INTERNAL MEDICINE

## 2020-10-28 PROCEDURE — 81001 URINALYSIS AUTO W/SCOPE: CPT | Performed by: INTERNAL MEDICINE

## 2020-10-28 PROCEDURE — 1111F DSCHRG MED/CURRENT MED MERGE: CPT | Performed by: INTERNAL MEDICINE

## 2020-10-28 RX ORDER — HYDROCHLOROTHIAZIDE 25 MG/1
25 TABLET ORAL DAILY
COMMUNITY
End: 2020-10-29 | Stop reason: ALTCHOICE

## 2020-10-28 RX ORDER — CIPROFLOXACIN 500 MG/1
500 TABLET, FILM COATED ORAL EVERY 12 HOURS SCHEDULED
Qty: 14 TABLET | Refills: 0 | Status: SHIPPED | OUTPATIENT
Start: 2020-10-28 | End: 2020-11-04

## 2020-10-29 ENCOUNTER — APPOINTMENT (OUTPATIENT)
Dept: LAB | Facility: CLINIC | Age: 75
End: 2020-10-29
Payer: COMMERCIAL

## 2020-10-29 ENCOUNTER — TRANSITIONAL CARE MANAGEMENT (OUTPATIENT)
Dept: FAMILY MEDICINE CLINIC | Facility: CLINIC | Age: 75
End: 2020-10-29

## 2020-10-29 ENCOUNTER — OFFICE VISIT (OUTPATIENT)
Dept: FAMILY MEDICINE CLINIC | Facility: CLINIC | Age: 75
End: 2020-10-29
Payer: COMMERCIAL

## 2020-10-29 VITALS
OXYGEN SATURATION: 97 % | HEART RATE: 70 BPM | BODY MASS INDEX: 24.23 KG/M2 | RESPIRATION RATE: 17 BRPM | HEIGHT: 67 IN | DIASTOLIC BLOOD PRESSURE: 58 MMHG | WEIGHT: 154.4 LBS | TEMPERATURE: 98 F | SYSTOLIC BLOOD PRESSURE: 92 MMHG

## 2020-10-29 DIAGNOSIS — N41.1 PROSTATITIS, CHRONIC: Primary | Chronic | ICD-10-CM

## 2020-10-29 DIAGNOSIS — N20.0 NEPHROLITHIASIS: Chronic | ICD-10-CM

## 2020-10-29 DIAGNOSIS — I10 ESSENTIAL HYPERTENSION: Chronic | ICD-10-CM

## 2020-10-29 DIAGNOSIS — N39.0 URINARY TRACT INFECTION WITHOUT HEMATURIA, SITE UNSPECIFIED: ICD-10-CM

## 2020-10-29 PROBLEM — N17.9 ACUTE KIDNEY INJURY (HCC): Status: RESOLVED | Noted: 2020-10-01 | Resolved: 2020-10-29

## 2020-10-29 LAB
ANION GAP SERPL CALCULATED.3IONS-SCNC: 7 MMOL/L (ref 4–13)
BACTERIA UR CULT: NORMAL
BASOPHILS # BLD AUTO: 0.03 THOUSANDS/ΜL (ref 0–0.1)
BASOPHILS NFR BLD AUTO: 0 % (ref 0–1)
BUN SERPL-MCNC: 38 MG/DL (ref 5–25)
CALCIUM SERPL-MCNC: 9.7 MG/DL (ref 8.3–10.1)
CHLORIDE SERPL-SCNC: 103 MMOL/L (ref 100–108)
CO2 SERPL-SCNC: 30 MMOL/L (ref 21–32)
CREAT SERPL-MCNC: 1.78 MG/DL (ref 0.6–1.3)
EOSINOPHIL # BLD AUTO: 0.11 THOUSAND/ΜL (ref 0–0.61)
EOSINOPHIL NFR BLD AUTO: 1 % (ref 0–6)
ERYTHROCYTE [DISTWIDTH] IN BLOOD BY AUTOMATED COUNT: 14.7 % (ref 11.6–15.1)
GFR SERPL CREATININE-BSD FRML MDRD: 37 ML/MIN/1.73SQ M
GLUCOSE SERPL-MCNC: 164 MG/DL (ref 65–140)
HCT VFR BLD AUTO: 45.4 % (ref 36.5–49.3)
HGB BLD-MCNC: 14.5 G/DL (ref 12–17)
IMM GRANULOCYTES # BLD AUTO: 0.06 THOUSAND/UL (ref 0–0.2)
IMM GRANULOCYTES NFR BLD AUTO: 1 % (ref 0–2)
LYMPHOCYTES # BLD AUTO: 0.81 THOUSANDS/ΜL (ref 0.6–4.47)
LYMPHOCYTES NFR BLD AUTO: 8 % (ref 14–44)
MCH RBC QN AUTO: 29.8 PG (ref 26.8–34.3)
MCHC RBC AUTO-ENTMCNC: 31.9 G/DL (ref 31.4–37.4)
MCV RBC AUTO: 93 FL (ref 82–98)
MONOCYTES # BLD AUTO: 0.5 THOUSAND/ΜL (ref 0.17–1.22)
MONOCYTES NFR BLD AUTO: 5 % (ref 4–12)
NEUTROPHILS # BLD AUTO: 8.63 THOUSANDS/ΜL (ref 1.85–7.62)
NEUTS SEG NFR BLD AUTO: 85 % (ref 43–75)
NRBC BLD AUTO-RTO: 0 /100 WBCS
PLATELET # BLD AUTO: 176 THOUSANDS/UL (ref 149–390)
PMV BLD AUTO: 11.2 FL (ref 8.9–12.7)
POTASSIUM SERPL-SCNC: 3.7 MMOL/L (ref 3.5–5.3)
RBC # BLD AUTO: 4.86 MILLION/UL (ref 3.88–5.62)
SODIUM SERPL-SCNC: 140 MMOL/L (ref 136–145)
WBC # BLD AUTO: 10.14 THOUSAND/UL (ref 4.31–10.16)

## 2020-10-29 PROCEDURE — 85025 COMPLETE CBC W/AUTO DIFF WBC: CPT | Performed by: INTERNAL MEDICINE

## 2020-10-29 PROCEDURE — 1160F RVW MEDS BY RX/DR IN RCRD: CPT | Performed by: INTERNAL MEDICINE

## 2020-10-29 PROCEDURE — 3008F BODY MASS INDEX DOCD: CPT | Performed by: INTERNAL MEDICINE

## 2020-10-29 PROCEDURE — 99214 OFFICE O/P EST MOD 30 MIN: CPT | Performed by: INTERNAL MEDICINE

## 2020-10-29 PROCEDURE — 3074F SYST BP LT 130 MM HG: CPT | Performed by: INTERNAL MEDICINE

## 2020-10-29 PROCEDURE — 80048 BASIC METABOLIC PNL TOTAL CA: CPT | Performed by: INTERNAL MEDICINE

## 2020-10-29 PROCEDURE — 36415 COLL VENOUS BLD VENIPUNCTURE: CPT | Performed by: INTERNAL MEDICINE

## 2020-10-29 PROCEDURE — 3078F DIAST BP <80 MM HG: CPT | Performed by: INTERNAL MEDICINE

## 2020-10-29 PROCEDURE — 1111F DSCHRG MED/CURRENT MED MERGE: CPT | Performed by: INTERNAL MEDICINE

## 2020-10-30 ENCOUNTER — TELEPHONE (OUTPATIENT)
Dept: FAMILY MEDICINE CLINIC | Facility: CLINIC | Age: 75
End: 2020-10-30

## 2020-10-30 DIAGNOSIS — N17.9 ACUTE KIDNEY INJURY (HCC): Primary | ICD-10-CM

## 2020-11-02 ENCOUNTER — APPOINTMENT (OUTPATIENT)
Dept: LAB | Facility: CLINIC | Age: 75
End: 2020-11-02
Payer: COMMERCIAL

## 2020-11-02 LAB
ANION GAP SERPL CALCULATED.3IONS-SCNC: 4 MMOL/L (ref 4–13)
BUN SERPL-MCNC: 18 MG/DL (ref 5–25)
CALCIUM SERPL-MCNC: 9.3 MG/DL (ref 8.3–10.1)
CHLORIDE SERPL-SCNC: 107 MMOL/L (ref 100–108)
CO2 SERPL-SCNC: 28 MMOL/L (ref 21–32)
CREAT SERPL-MCNC: 1.13 MG/DL (ref 0.6–1.3)
GFR SERPL CREATININE-BSD FRML MDRD: 64 ML/MIN/1.73SQ M
GLUCOSE P FAST SERPL-MCNC: 106 MG/DL (ref 65–99)
POTASSIUM SERPL-SCNC: 4.7 MMOL/L (ref 3.5–5.3)
SODIUM SERPL-SCNC: 139 MMOL/L (ref 136–145)

## 2020-11-02 PROCEDURE — 80048 BASIC METABOLIC PNL TOTAL CA: CPT | Performed by: INTERNAL MEDICINE

## 2020-11-02 PROCEDURE — 36415 COLL VENOUS BLD VENIPUNCTURE: CPT | Performed by: INTERNAL MEDICINE

## 2020-11-06 ENCOUNTER — APPOINTMENT (OUTPATIENT)
Dept: LAB | Facility: CLINIC | Age: 75
End: 2020-11-06
Payer: COMMERCIAL

## 2020-11-06 PROCEDURE — 83735 ASSAY OF MAGNESIUM: CPT

## 2020-11-06 PROCEDURE — 84392 ASSAY OF URINE SULFATE: CPT

## 2020-11-06 PROCEDURE — 84133 ASSAY OF URINE POTASSIUM: CPT

## 2020-11-06 PROCEDURE — 82340 ASSAY OF CALCIUM IN URINE: CPT

## 2020-11-06 PROCEDURE — 84560 ASSAY OF URINE/URIC ACID: CPT

## 2020-11-06 PROCEDURE — 83935 ASSAY OF URINE OSMOLALITY: CPT

## 2020-11-06 PROCEDURE — 81003 URINALYSIS AUTO W/O SCOPE: CPT

## 2020-11-06 PROCEDURE — 82436 ASSAY OF URINE CHLORIDE: CPT

## 2020-11-06 PROCEDURE — 82507 ASSAY OF CITRATE: CPT

## 2020-11-06 PROCEDURE — 82131 AMINO ACIDS SINGLE QUANT: CPT

## 2020-11-06 PROCEDURE — 82140 ASSAY OF AMMONIA: CPT

## 2020-11-06 PROCEDURE — 83945 ASSAY OF OXALATE: CPT

## 2020-11-06 PROCEDURE — 84105 ASSAY OF URINE PHOSPHORUS: CPT

## 2020-11-06 PROCEDURE — 82570 ASSAY OF URINE CREATININE: CPT

## 2020-11-06 PROCEDURE — 84300 ASSAY OF URINE SODIUM: CPT

## 2020-11-17 LAB
AMMONIA 24H UR-MRATE: 28 MEQ/24 HR
AMMONIA UR-SCNC: ABNORMAL UG/DL
CA H2 PHOS DIHYD CRY URNS QL MICRO: 0.16 RATIO (ref 0–3)
CALCIUM 24H UR-MCNC: 3.6 MG/DL
CALCIUM 24H UR-MRATE: 75.6 MG/24 HR (ref 100–300)
CHLORIDE 24H UR-SCNC: 26 MMOL/L
CHLORIDE 24H UR-SRATE: 55 MMOL/24 HR (ref 110–250)
CITRATE 24H UR-MCNC: 71 MG/L
CITRATE 24H UR-MRATE: 149 MG/24 HR (ref 320–1240)
COM CRY STONE QL IR: 4.24 RATIO (ref 0–6)
CREAT 24H UR-MCNC: 52.1 MG/DL
CREAT 24H UR-MRATE: 1094.1 MG/24 HR (ref 1000–2000)
CYSTINE 24H UR-MCNC: 4.36 MG/L
CYSTINE 24H UR-MRATE: 9.16 MG/24 HR (ref 10–100)
MAGNESIUM 24H UR-MRATE: 38 MG/24 HR (ref 12–293)
MAGNESIUM UR-MCNC: 1.8 MG/DL
NA URATE CRY STONE QL IR: 0.25 RATIO (ref 0–4)
OSMOLALITY UR: 265 MOSMOL/KG (ref 300–900)
OXALATE 24H UR-MRATE: 42 MG/24 HR (ref 7–44)
OXALATE UR-MCNC: 20 MG/L
PH 24H UR: 5.6 [PH]
PHOSPHATE 24H UR-MCNC: 29.3 MG/DL
PHOSPHATE 24H UR-MRATE: 615.3 MG/24 HR (ref 400–1300)
PLEASE NOTE (STONE RISK): ABNORMAL
POTASSIUM 24H UR-SCNC: 61.3 MMOL/24 HR (ref 25–125)
POTASSIUM UR-SCNC: 29.2 MMOL/L
PRESERVED URINE: 2100 ML/24 HR (ref 800–1800)
SODIUM 24H UR-SCNC: 21 MMOL/L
SODIUM 24H UR-SRATE: 44 MMOL/24 HR (ref 58–337)
SPECIMEN VOL 24H UR: 2100 ML/24 HR (ref 800–1800)
SULFATE 24H UR-MCNC: 15 MEQ/24 HR (ref 0–30)
SULFATE UR-MCNC: 7 MEQ/L
TRI-PHOS CRY STONE MICRO: 0.01 RATIO (ref 0–1)
URATE 24H UR-MCNC: 14.2 MG/DL
URATE 24H UR-MRATE: 298 MG/24 HR (ref 250–750)
URATE DIHYD CRY STONE QL IR: 1.15 RATIO (ref 0–1.2)

## 2020-12-07 ENCOUNTER — OFFICE VISIT (OUTPATIENT)
Dept: FAMILY MEDICINE CLINIC | Facility: CLINIC | Age: 75
End: 2020-12-07
Payer: COMMERCIAL

## 2020-12-07 VITALS
WEIGHT: 163.3 LBS | HEIGHT: 67 IN | BODY MASS INDEX: 25.63 KG/M2 | DIASTOLIC BLOOD PRESSURE: 92 MMHG | SYSTOLIC BLOOD PRESSURE: 155 MMHG | HEART RATE: 69 BPM | TEMPERATURE: 97.3 F | OXYGEN SATURATION: 96 %

## 2020-12-07 DIAGNOSIS — Z23 NEED FOR VACCINATION: ICD-10-CM

## 2020-12-07 DIAGNOSIS — J30.0 VASOMOTOR RHINITIS: ICD-10-CM

## 2020-12-07 DIAGNOSIS — I10 ESSENTIAL HYPERTENSION: Primary | Chronic | ICD-10-CM

## 2020-12-07 DIAGNOSIS — E78.5 DYSLIPIDEMIA: ICD-10-CM

## 2020-12-07 PROBLEM — A41.9 SEPSIS (HCC): Status: RESOLVED | Noted: 2020-10-01 | Resolved: 2020-12-07

## 2020-12-07 PROCEDURE — 99213 OFFICE O/P EST LOW 20 MIN: CPT | Performed by: INTERNAL MEDICINE

## 2020-12-07 PROCEDURE — 3080F DIAST BP >= 90 MM HG: CPT | Performed by: INTERNAL MEDICINE

## 2020-12-07 PROCEDURE — 3008F BODY MASS INDEX DOCD: CPT | Performed by: UROLOGY

## 2020-12-07 PROCEDURE — G0009 ADMIN PNEUMOCOCCAL VACCINE: HCPCS

## 2020-12-07 PROCEDURE — 3077F SYST BP >= 140 MM HG: CPT | Performed by: INTERNAL MEDICINE

## 2020-12-07 PROCEDURE — 90732 PPSV23 VACC 2 YRS+ SUBQ/IM: CPT

## 2020-12-07 RX ORDER — LOSARTAN POTASSIUM 25 MG/1
25 TABLET ORAL DAILY
Qty: 90 TABLET | Refills: 0 | Status: SHIPPED | OUTPATIENT
Start: 2020-12-07 | End: 2021-03-01

## 2020-12-07 RX ORDER — IPRATROPIUM BROMIDE 21 UG/1
2 SPRAY, METERED NASAL 2 TIMES DAILY
Qty: 30 ML | Refills: 0 | Status: SHIPPED | OUTPATIENT
Start: 2020-12-07 | End: 2021-01-05

## 2020-12-09 ENCOUNTER — TELEMEDICINE (OUTPATIENT)
Dept: UROLOGY | Facility: MEDICAL CENTER | Age: 75
End: 2020-12-09
Payer: COMMERCIAL

## 2020-12-09 DIAGNOSIS — N20.0 CALCULUS OF KIDNEY: Primary | ICD-10-CM

## 2020-12-09 PROCEDURE — 1160F RVW MEDS BY RX/DR IN RCRD: CPT | Performed by: UROLOGY

## 2020-12-09 PROCEDURE — 99213 OFFICE O/P EST LOW 20 MIN: CPT | Performed by: UROLOGY

## 2020-12-09 PROCEDURE — 1036F TOBACCO NON-USER: CPT | Performed by: UROLOGY

## 2020-12-09 PROCEDURE — 4040F PNEUMOC VAC/ADMIN/RCVD: CPT | Performed by: UROLOGY

## 2020-12-15 ENCOUNTER — TELEPHONE (OUTPATIENT)
Dept: PLASTIC SURGERY | Facility: CLINIC | Age: 75
End: 2020-12-15

## 2020-12-30 DIAGNOSIS — J30.0 VASOMOTOR RHINITIS: ICD-10-CM

## 2020-12-30 RX ORDER — IPRATROPIUM BROMIDE 21 UG/1
2 SPRAY, METERED NASAL 2 TIMES DAILY
OUTPATIENT
Start: 2020-12-30

## 2021-01-04 DIAGNOSIS — J30.0 VASOMOTOR RHINITIS: ICD-10-CM

## 2021-01-05 RX ORDER — IPRATROPIUM BROMIDE 21 UG/1
2 SPRAY, METERED NASAL 2 TIMES DAILY
Qty: 30 ML | Refills: 1 | Status: SHIPPED | OUTPATIENT
Start: 2021-01-05 | End: 2021-02-01

## 2021-01-08 ENCOUNTER — TELEPHONE (OUTPATIENT)
Dept: FAMILY MEDICINE CLINIC | Facility: CLINIC | Age: 76
End: 2021-01-08

## 2021-01-08 NOTE — TELEPHONE ENCOUNTER
Patient mailed NEIL to send Records to Dr Nadine Philippe new office (not a  practice) in [facility]  Please update PCP field

## 2021-01-15 DIAGNOSIS — N40.0 BENIGN PROSTATIC HYPERPLASIA, UNSPECIFIED WHETHER LOWER URINARY TRACT SYMPTOMS PRESENT: ICD-10-CM

## 2021-01-15 NOTE — TELEPHONE ENCOUNTER
Patient left a message on the Medication Refill voice mail line requesting a new prescription for Tamsulosin 0 4mg, 90 day supply to [facility]/pharmacy in [address] [place] [address]

## 2021-01-16 NOTE — TELEPHONE ENCOUNTER
The patient was last seen for yearly exam on 1/17/20 by Jocelin Guevara PA-C in the [facility] location  The patient is overdue for a routine office visit  However, in light of the COVID-19 pandemic, this prescription is being refilled  Request for same, 90 day supply with 1 refill were queued and forwarded to the Advanced Practitioner covering the [facility] location for approval     Message will be forwarded to Alexandra Wiggins (R) so they can schedule an office visit as soon as possible

## 2021-01-17 DIAGNOSIS — N40.0 BENIGN PROSTATIC HYPERPLASIA, UNSPECIFIED WHETHER LOWER URINARY TRACT SYMPTOMS PRESENT: ICD-10-CM

## 2021-01-17 RX ORDER — TAMSULOSIN HYDROCHLORIDE 0.4 MG/1
0.4 CAPSULE ORAL
Qty: 90 CAPSULE | Refills: 1 | Status: SHIPPED | OUTPATIENT
Start: 2021-01-17

## 2021-01-17 RX ORDER — TAMSULOSIN HYDROCHLORIDE 0.4 MG/1
CAPSULE ORAL
Qty: 90 CAPSULE | Refills: 0 | Status: SHIPPED | OUTPATIENT
Start: 2021-01-17 | End: 2021-10-19 | Stop reason: SDUPTHER

## 2021-01-18 NOTE — TELEPHONE ENCOUNTER
PT WAS CALLED AND HE SAID HE WILL DISCUSS WITH  [facility] HIS NEXT FOLLOW UP

## 2021-01-30 DIAGNOSIS — J30.0 VASOMOTOR RHINITIS: ICD-10-CM

## 2021-02-01 RX ORDER — IPRATROPIUM BROMIDE 21 UG/1
2 SPRAY, METERED NASAL 2 TIMES DAILY
Qty: 90 ML | Refills: 3 | Status: SHIPPED | OUTPATIENT
Start: 2021-02-01

## 2021-02-02 NOTE — TELEPHONE ENCOUNTER
02/02/21 11:27 AM     Thank you for your request  Your request has been received, reviewed, and the patient chart updated  The PCP has successfully been removed with a patient attribution note  This message will now be completed      Thank you  Earline Boyle

## 2021-03-01 DIAGNOSIS — I10 ESSENTIAL HYPERTENSION: Chronic | ICD-10-CM

## 2021-03-01 RX ORDER — LOSARTAN POTASSIUM 25 MG/1
TABLET ORAL
Qty: 90 TABLET | Refills: 0 | Status: SHIPPED | OUTPATIENT
Start: 2021-03-01 | End: 2021-05-24

## 2021-03-18 DIAGNOSIS — E78.5 DYSLIPIDEMIA: ICD-10-CM

## 2021-03-18 RX ORDER — ATORVASTATIN CALCIUM 10 MG/1
TABLET, FILM COATED ORAL
Qty: 90 TABLET | Refills: 3 | OUTPATIENT
Start: 2021-03-18

## 2021-04-12 DIAGNOSIS — E78.5 DYSLIPIDEMIA: ICD-10-CM

## 2021-04-12 DIAGNOSIS — I10 ESSENTIAL HYPERTENSION: ICD-10-CM

## 2021-04-13 RX ORDER — ATORVASTATIN CALCIUM 10 MG/1
TABLET, FILM COATED ORAL
Qty: 90 TABLET | Refills: 3 | OUTPATIENT
Start: 2021-04-13

## 2021-04-13 RX ORDER — AMLODIPINE BESYLATE 10 MG/1
TABLET ORAL
Qty: 90 TABLET | Refills: 1 | OUTPATIENT
Start: 2021-04-13

## 2021-05-23 DIAGNOSIS — I10 ESSENTIAL HYPERTENSION: Chronic | ICD-10-CM

## 2021-05-24 RX ORDER — LOSARTAN POTASSIUM 25 MG/1
TABLET ORAL
Qty: 90 TABLET | Refills: 0 | Status: SHIPPED | OUTPATIENT
Start: 2021-05-24 | End: 2021-08-15

## 2021-08-15 DIAGNOSIS — I10 ESSENTIAL HYPERTENSION: Chronic | ICD-10-CM

## 2021-08-15 RX ORDER — LOSARTAN POTASSIUM 25 MG/1
TABLET ORAL
Qty: 90 TABLET | Refills: 0 | Status: SHIPPED | OUTPATIENT
Start: 2021-08-15

## 2021-08-16 NOTE — TELEPHONE ENCOUNTER
Pt was called and informed on medication, pt is continuing care with [facility]  and does not want to continue care with us

## 2021-10-19 DIAGNOSIS — N40.0 BENIGN PROSTATIC HYPERPLASIA, UNSPECIFIED WHETHER LOWER URINARY TRACT SYMPTOMS PRESENT: ICD-10-CM

## 2021-10-19 RX ORDER — TAMSULOSIN HYDROCHLORIDE 0.4 MG/1
0.4 CAPSULE ORAL
Qty: 90 CAPSULE | Refills: 3 | Status: SHIPPED | OUTPATIENT
Start: 2021-10-19

## 2021-12-02 ENCOUNTER — VBI (OUTPATIENT)
Dept: ADMINISTRATIVE | Facility: OTHER | Age: 76
End: 2021-12-02

## 2022-01-17 ENCOUNTER — VBI (OUTPATIENT)
Dept: ADMINISTRATIVE | Facility: OTHER | Age: 77
End: 2022-01-17

## 2022-10-06 DIAGNOSIS — N40.0 BENIGN PROSTATIC HYPERPLASIA, UNSPECIFIED WHETHER LOWER URINARY TRACT SYMPTOMS PRESENT: ICD-10-CM

## 2022-10-07 RX ORDER — TAMSULOSIN HYDROCHLORIDE 0.4 MG/1
CAPSULE ORAL
Qty: 90 CAPSULE | Refills: 3 | Status: SHIPPED | OUTPATIENT
Start: 2022-10-07

## 2023-03-24 NOTE — TELEPHONE ENCOUNTER
Called and spoke to patient  Made him that urine does not appear to be infected and that we will continue to monitor for culture results  Patient is aware that we will call him with urine culture results once they are in  Patient verbalized understanding  Griseofulvin Counseling:  I discussed with the patient the risks of griseofulvin including but not limited to photosensitivity, cytopenia, liver damage, nausea/vomiting and severe allergy.  The patient understands that this medication is best absorbed when taken with a fatty meal (e.g., ice cream or french fries).

## 2023-03-30 ENCOUNTER — NURSE TRIAGE (OUTPATIENT)
Dept: OTHER | Facility: OTHER | Age: 78
End: 2023-03-30

## 2023-03-30 ENCOUNTER — TELEPHONE (OUTPATIENT)
Dept: OTHER | Facility: OTHER | Age: 78
End: 2023-03-30

## 2023-03-30 DIAGNOSIS — R10.9 FLANK PAIN: Primary | ICD-10-CM

## 2023-03-30 NOTE — TELEPHONE ENCOUNTER
Regarding: Kidney pain  ----- Message from Bre Muniz sent at 3/30/2023  4:29 PM EDT -----  '' I'm concerned about the kidney pain I'm having ''

## 2023-03-30 NOTE — TELEPHONE ENCOUNTER
Called patient to schedule CT  He questions why CT, he would prefer KUB  He notes the pain has decreased since  yesterday  Please advise

## 2023-03-30 NOTE — TELEPHONE ENCOUNTER
Pt called to find out if he can CT can be switched for KUB  Informed patient that a message had been sent to [facility] and am awaiting an answer  Phone number for central scheduling given as well

## 2023-03-30 NOTE — TELEPHONE ENCOUNTER
Pt  Has a kidney stone and he's had them before and  he wants to know what are the next steps to take  Please return his call  Pt   Also needs appointment

## 2023-03-31 ENCOUNTER — TELEPHONE (OUTPATIENT)
Dept: OTHER | Facility: OTHER | Age: 78
End: 2023-03-31

## 2023-03-31 ENCOUNTER — HOSPITAL ENCOUNTER (OUTPATIENT)
Dept: RADIOLOGY | Facility: HOSPITAL | Age: 78
Discharge: HOME | End: 2023-03-31

## 2023-03-31 DIAGNOSIS — R10.9 FLANK PAIN: ICD-10-CM

## 2023-03-31 NOTE — TELEPHONE ENCOUNTER
Spoke to pt and advised    JAN Jimenez      3:35 PM  Note   KUB ordered  If KUB reveals no stone the patient continues to be symptomatic she will still need to proceed with a CT scan  It is his prerogative whether he chooses to move forward with a CT scan are not        Pt stated he had urine testing performed at [facility]  Advised that he call them to fax results to us at [phone]

## 2023-03-31 NOTE — TELEPHONE ENCOUNTER
The patient called to have the laboratory order sent to [place] [address] Lab Fax: [phone] fax was sent

## 2023-03-31 NOTE — TELEPHONE ENCOUNTER
KUB ordered  If KUB reveals no stone the patient continues to be symptomatic she will still need to proceed with a CT scan    It is his prerogative whether he chooses to move forward with a CT scan are not

## 2023-03-31 NOTE — TELEPHONE ENCOUNTER
Call placed to patient and spoke with him  I informed him that his previous request was sent to the provider team to review  Pt is asking why he cannot have KUB done as he can have this done at any time and it would give an indication is CT scan is truly necessary or if he needs to surgery sooner  I did inform patient that CT scan will show a better picture and determination of where the stone is located and what treatment would be best    Pt understands but is still addiment about having KUB done to 1st determine what is going on with this stone  I informed patient that I will send a message directly to Dr Jurgen Celeste to review his questions and once it is reviewed, the office will contact him with further plan and recommendations  Pt will have urine testing done

## 2023-04-05 ENCOUNTER — HOSPITAL ENCOUNTER (OUTPATIENT)
Dept: CT IMAGING | Facility: HOSPITAL | Age: 78
Discharge: HOME | End: 2023-04-05

## 2023-04-05 DIAGNOSIS — R10.9 FLANK PAIN: ICD-10-CM

## 2023-04-05 NOTE — TELEPHONE ENCOUNTER
MD Jacob Rush, RN 19 hours ago (4:04 PM)     I looked at KUB film, not helpful, too much gas   Does need CT scan  I spoke with the patient and he is scheduled for a CT scan this evening  He is still having some pain and discomfort and has been taking oxycodone that he had left from a while back but is running low  He would like to know if this can be sent to his pharmacy since this is what seems to work with the pain  Unable to take ibuprofen

## 2023-04-06 ENCOUNTER — APPOINTMENT (OUTPATIENT)
Dept: LAB | Facility: HOSPITAL | Age: 78
End: 2023-04-06

## 2023-04-06 DIAGNOSIS — N20.0 NEPHROLITHIASIS: Primary | Chronic | ICD-10-CM

## 2023-04-06 DIAGNOSIS — N20.0 NEPHROLITHIASIS: Chronic | ICD-10-CM

## 2023-04-06 DIAGNOSIS — R10.9 FLANK PAIN: ICD-10-CM

## 2023-04-06 LAB
ANION GAP SERPL CALCULATED.3IONS-SCNC: 10 MMOL/L (ref 4–13)
BACTERIA UR QL AUTO: ABNORMAL /HPF
BILIRUB UR QL STRIP: NEGATIVE
BUN SERPL-MCNC: 27 MG/DL (ref 5–25)
CALCIUM SERPL-MCNC: 9.5 MG/DL (ref 8.4–10.2)
CHLORIDE SERPL-SCNC: 106 MMOL/L (ref 96–108)
CLARITY UR: CLEAR
CO2 SERPL-SCNC: 26 MMOL/L (ref 21–32)
COLOR UR: YELLOW
CREAT SERPL-MCNC: 1.43 MG/DL (ref 0.6–1.3)
GFR SERPL CREATININE-BSD FRML MDRD: 47 ML/MIN/1.73SQ M
GLUCOSE P FAST SERPL-MCNC: 97 MG/DL (ref 65–99)
GLUCOSE UR STRIP-MCNC: NEGATIVE MG/DL
HGB UR QL STRIP.AUTO: ABNORMAL
KETONES UR STRIP-MCNC: NEGATIVE MG/DL
LEUKOCYTE ESTERASE UR QL STRIP: NEGATIVE
NITRITE UR QL STRIP: NEGATIVE
NON-SQ EPI CELLS URNS QL MICRO: ABNORMAL /HPF
PH UR STRIP.AUTO: 6 [PH]
POTASSIUM SERPL-SCNC: 4.4 MMOL/L (ref 3.5–5.3)
PROT UR STRIP-MCNC: ABNORMAL MG/DL
RBC #/AREA URNS AUTO: ABNORMAL /HPF
SODIUM SERPL-SCNC: 142 MMOL/L (ref 135–147)
SP GR UR STRIP.AUTO: >=1.03 (ref 1–1.03)
UROBILINOGEN UR QL STRIP.AUTO: 0.2 E.U./DL
WBC #/AREA URNS AUTO: ABNORMAL /HPF

## 2023-04-06 RX ORDER — OXYCODONE HYDROCHLORIDE AND ACETAMINOPHEN 5; 325 MG/1; MG/1
1-2 TABLET ORAL EVERY 6 HOURS PRN
Qty: 12 TABLET | Refills: 0 | Status: SHIPPED | OUTPATIENT
Start: 2023-04-06

## 2023-04-06 NOTE — PROGRESS NOTES
Telephone discussion of his CT scan done last night  5 mm upper right ureteral stone with mild hydro-  Couple other stones up in that same a right kidney  He has pain and has been on and off, currently feeling fine today  Option discussed:  1  He wants more time to try to pass the stone, and since his pain is well managed we will give it some time  2   He will get BMP today, check on his creatinine    3  He knows that if the pain gets bad, we will need to schedule a procedure  I told him in general we can get a procedure for an obstructing stone done within a week or so depending on symptoms  4   He is hesitant to start  schedule because he had retention several days after a similar procedure in 2020  He was admitted with sepsis, remembers  a very bad experience

## 2023-04-07 LAB — BACTERIA UR CULT: NORMAL

## 2023-04-27 ENCOUNTER — OFFICE VISIT (OUTPATIENT)
Dept: UROLOGY | Facility: MEDICAL CENTER | Age: 78
End: 2023-04-27

## 2023-04-27 VITALS
BODY MASS INDEX: 25.88 KG/M2 | SYSTOLIC BLOOD PRESSURE: 140 MMHG | DIASTOLIC BLOOD PRESSURE: 88 MMHG | OXYGEN SATURATION: 95 % | HEIGHT: 66 IN | HEART RATE: 97 BPM | WEIGHT: 161 LBS

## 2023-04-27 DIAGNOSIS — N20.0 NEPHROLITHIASIS: Primary | ICD-10-CM

## 2023-04-27 DIAGNOSIS — N20.1 CALCULUS OF URETER: ICD-10-CM

## 2023-04-27 DIAGNOSIS — N13.8 BPH WITH URINARY OBSTRUCTION: ICD-10-CM

## 2023-04-27 DIAGNOSIS — N40.1 BPH WITH URINARY OBSTRUCTION: ICD-10-CM

## 2023-04-27 LAB
SL AMB  POCT GLUCOSE, UA: ABNORMAL
SL AMB LEUKOCYTE ESTERASE,UA: ABNORMAL
SL AMB POCT BILIRUBIN,UA: ABNORMAL
SL AMB POCT BLOOD,UA: ABNORMAL
SL AMB POCT CLARITY,UA: CLEAR
SL AMB POCT COLOR,UA: YELLOW
SL AMB POCT KETONES,UA: ABNORMAL
SL AMB POCT NITRITE,UA: ABNORMAL
SL AMB POCT PH,UA: 6
SL AMB POCT SPECIFIC GRAVITY,UA: 1.01
SL AMB POCT URINE PROTEIN: ABNORMAL
SL AMB POCT UROBILINOGEN: 0.2

## 2023-04-27 NOTE — PROGRESS NOTES
HISTORY:    Follow-up for a 5 mm right upper ureteral stone, symptoms started sometime in mid March  CT scan was done April 5, showing moderate hydro to that stone  Couple smaller stones up in the right kidney  Since that time he has had similar right flank and right CVA pain, but much milder has not to take any pain meds  BPH on tamsulosin with overall good results, he thinks the stream is good in general    When I spoke to patient on the phone about this CT and that he should consider ureteroscopy, he told me he was reluctant to have any procedure done for the following reason:    September 2020, he underwent left ureteroscopy for impacted stone  Stent removed 5 days later  He developed retention about 3 days after the stent was removed  He was admitted to hospital with sepsis, remembers that as a horrible experience and is understandably afraid of having sepsis again  ASSESSMENT / PLAN:    1  Lengthy discussion regarding the procedure, I recommend he have it done  He still has microscopic materia, has not seen a stone, and has mild pains similar to what he started with  I do not think he needs repeat CT scan    2  Regarding his BPH and episode of retention after procedure in 2020, I said we could consider placing a Dawson at the time of the procedure, and leave it in for 1 to 2 days postop  He would like to think about that  Urine culture today  He would like to undergo the ureteroscopy laser stone and stent, we will schedule    The following portions of the patient's history were reviewed and updated as appropriate: allergies, current medications, past family history, past medical history, past social history, past surgical history and problem list     Review of Systems   All other systems reviewed and are negative  Objective:     Physical Exam  Constitutional:       Appearance: Normal appearance  Neurological:      Mental Status: He is alert             0   Lab Value Date/Time    PSA 1 3 11/22/2019 1059    PSA 1 5 01/07/2019 1434    PSA 2 0 10/26/2017 1401    PSA 1 4 03/08/2016 0928   ]  BUN   Date Value Ref Range Status   04/06/2023 27 (H) 5 - 25 mg/dL Final     Creatinine   Date Value Ref Range Status   04/06/2023 1 43 (H) 0 60 - 1 30 mg/dL Final     Comment:     Standardized to IDMS reference method     No components found for: CBC      Patient Active Problem List   Diagnosis   • Essential hypertension   • Prostatitis, chronic   • History of total right hip arthroplasty   • Hereditary and idiopathic peripheral neuropathy   • Nephrolithiasis   • Primary open-angle glaucoma, bilateral, moderate stage   • History of abdominal hernia   • Chronic gout   • Gastroesophageal reflux disease without esophagitis   • Rosacea   • Diverticulosis   • Dyslipidemia   • Osteoarthritis of spine with radiculopathy, lumbar region   • Diplopia        Diagnoses and all orders for this visit:    Nephrolithiasis  -     POCT urine dip auto non-scope    Calculus of ureter  -     Urine culture    BPH with urinary obstruction    Other orders  -     carboxymethylcellulose 1 % ophthalmic solution; Apply 1 drop to eye  -     Fluticasone Propionate (FLONASE ALLERGY RELIEF NA); into each nostril as needed           Patient ID: Luisa Turner is a 78 y o  male        Current Outpatient Medications:   •  allopurinol (ZYLOPRIM) 300 mg tablet, Take 1 tablet (300 mg total) by mouth daily, Disp: 90 tablet, Rfl: 3  •  amLODIPine (NORVASC) 10 mg tablet, TAKE 1 TABLET BY MOUTH EVERY DAY, Disp: 90 tablet, Rfl: 1  •  atorvastatin (LIPITOR) 10 mg tablet, Take 1 tablet (10 mg total) by mouth daily, Disp: 90 tablet, Rfl: 3  •  carboxymethylcellulose 1 % ophthalmic solution, Apply 1 drop to eye, Disp: , Rfl:   •  Diclofenac Sodium (VOLTAREN) 1 %, diclofenac 1 % topical gel  APPLY 2 GRAM TO THE AFFECTED AREA(S) BY TOPICAL ROUTE EVERY 6 HOURS AS NEEDED, Disp: , Rfl:   •  Fluticasone Propionate (FLONASE ALLERGY RELIEF NA), into each nostril as needed, Disp: , Rfl:   •  gabapentin (NEURONTIN) 300 mg capsule, Take 300 mg by mouth daily at bedtime, Disp: , Rfl:   •  losartan (COZAAR) 25 mg tablet, TAKE 1 TABLET BY MOUTH EVERY DAY (Patient taking differently: 50 mg Taking 50 mg), Disp: 90 tablet, Rfl: 0  •  metroNIDAZOLE (METROGEL) 1 % gel, Apply topically daily, Disp: 60 g, Rfl: 2  •  omeprazole (PriLOSEC) 20 mg delayed release capsule, Take 1 capsule (20 mg total) by mouth daily, Disp: , Rfl:   •  oxyCODONE-acetaminophen (PERCOCET) 5-325 mg per tablet, Take 1-2 tablets by mouth every 6 (six) hours as needed for moderate pain or severe pain Max Daily Amount: 8 tablets, Disp: 12 tablet, Rfl: 0  •  tamsulosin (FLOMAX) 0 4 mg, TAKE 1 CAPSULE BY MOUTH AT BEDTIME, Disp: 90 capsule, Rfl: 3  •  timolol (TIMOPTIC) 0 5 % ophthalmic solution, Administer 1 drop to both eyes 2 (two) times a day , Disp: , Rfl: 3  •  ipratropium (ATROVENT) 0 03 % nasal spray, 2 SPRAYS INTO EACH NOSTRIL 2 (TWO) TIMES A DAY (Patient not taking: Reported on 4/27/2023), Disp: 90 mL, Rfl: 3  •  tamsulosin (FLOMAX) 0 4 mg, Take 1 capsule (0 4 mg total) by mouth daily at bedtime (Patient not taking: Reported on 4/27/2023), Disp: 90 capsule, Rfl: 1    Past Medical History:   Diagnosis Date   • Hiatal hernia    • Hyperlipidemia    • Hypertension    • Kidney stone    • Left inguinal hernia    • Lyme disease     last assessed 7/10/2017   • Peripheral neuropathy        Past Surgical History:   Procedure Laterality Date   • ABDOMINAL SURGERY     • CATARACT EXTRACTION     • COLONOSCOPY      last assessed 6/17/2015   • DUPUYTREN / PALMAR FASCIOTOMY      last assessed 6/17/2015   • DUPUYTREN CONTRACTURE RELEASE Left    • ESOPHAGOGASTRODUODENOSCOPY     • HERNIA REPAIR Right     inguinal   • HERNIA REPAIR Left 2/9/2017    Procedure: REPAIR HERNIA INGUINAL, LAPAROSCOPIC WITH MESH;  Surgeon: Joaquim Kellogg MD;  Location: [facility];  Service:    • INGUINAL HERNIA REPAIR Bilateral 2011 with mesh done at [facility]   • JOINT REPLACEMENT      hip replacement   • LITHOTRIPSY     • GA COLONOSCOPY FLX DX W/COLLJ SPEC WHEN PFRMD N/A 10/31/2018    Procedure: EGD AND COLONOSCOPY;  Surgeon: Yolie Rico MD;  Location: [facility] GI LAB;   Service: Gastroenterology   • GA CYSTO/URETERO W/LITHOTRIPSY &INDWELL STENT INSRT Left 9/22/2020    Procedure: CYSTOSCOPY URETEROSCOPY WITH LITHOTRIPSY HOLMIUM LASER, RETROGRADE PYELOGRAM AND INSERTION STENT URETERAL;  Surgeon: Malu Hernandez MD;  Location: [address] [place] [address];  Service: Urology   • TONSILLECTOMY     • TOTAL HIP ARTHROPLASTY Right     last assessed 12/5/2014       Social History

## 2023-04-29 LAB — BACTERIA UR CULT: ABNORMAL

## 2023-05-01 ENCOUNTER — TELEPHONE (OUTPATIENT)
Dept: UROLOGY | Facility: CLINIC | Age: 78
End: 2023-05-01

## 2023-05-01 NOTE — TELEPHONE ENCOUNTER
LM in re to sched procedure, asked to pls cb  Holding 6/13 at [facility] Dr Margaux Doss

## 2023-05-02 NOTE — TELEPHONE ENCOUNTER
Patient said he would like a call back to talk about the date you suggested  He's been waiting for 4 weeks since symptoms started  I told him you might be calling from a blocked to number to please answer it just for 'til she calls him back today  He might have some clinical questions as well  I think Cherie Henderson is here until 6pm  If you want make his one of his 1st calls in case that you would need Nadiya's assistance      Patient can be reached at [phone]

## 2023-05-04 NOTE — TELEPHONE ENCOUNTER
"I spoke to the patient, saving 6/13/2023 at [facility] with Dr Shukri Dalal  Patient states that he was told by Dr Shukri Dalal this would be done within 2 weeks  No documentation of this being a fast track surgery during 4/27/2023 visit  I never received this order when placed 4/11/2023, only became aware of it 4/21/2023 and patient had visit scheduled to discuss 4/27/2023  He was called to schedule 5/1/2023  Please review chart to determine if 6/13/2023 is a appropriate date or should be considered \"fast Track\"  Also, patient is questioning 4/27/2023 Urine results and would like someone to call and review them    "

## 2023-05-05 NOTE — TELEPHONE ENCOUNTER
Schedule with next available is fine [No Acute Distress] : no acute distress [Alert] : alert [Clear tympanic membranes with bony landmarks and light reflex present bilaterally] : clear tympanic membranes with bony landmarks and light reflex present bilaterally  [Normocephalic] : normocephalic [EOMI Bilateral] : EOMI bilateral [Supple, full passive range of motion] : supple, full passive range of motion [Nonerythematous Oropharynx] : nonerythematous oropharynx [Pink Nasal Mucosa] : pink nasal mucosa [Regular Rate and Rhythm] : regular rate and rhythm [No Palpable Masses] : no palpable masses [Clear to Ausculatation Bilaterally] : clear to auscultation bilaterally [No Murmurs] : no murmurs [Normal S1, S2 audible] : normal S1, S2 audible [+2 Femoral Pulses] : +2 femoral pulses [Soft] : soft [NonTender] : non tender [Non Distended] : non distended [Normoactive Bowel Sounds] : normoactive bowel sounds [No Hepatomegaly] : no hepatomegaly [No Splenomegaly] : no splenomegaly [Uncircumcised] : uncircumcised [No Abnormal Lymph Nodes Palpated] : no abnormal lymph nodes palpated [Christophe: _____] : Christophe [unfilled] [No Gait Asymmetry] : no gait asymmetry [Normal Muscle Tone] : normal muscle tone [No pain or deformities with palpation of bone, muscles, joints] : no pain or deformities with palpation of bone, muscles, joints [+2 Patella DTR] : +2 patella DTR [Straight] : straight [Cranial Nerves Grossly Intact] : cranial nerves grossly intact [No Rash or Lesions] : no rash or lesions

## 2023-05-22 DIAGNOSIS — N20.1 CALCULUS OF URETER: Primary | ICD-10-CM

## 2023-05-22 NOTE — PROGRESS NOTES
Phone call, patient is totally asymptomatic from his mid ureteral stone    We will get another KUB to reevaluate    I was scheduled to do his surgery on June 13, but I am not able to do it that week  I offered him the next week, but he has longstanding vacation plans on June 23  which he cannot change     So I will look for one of my partners to do the procedure for him the week of June 12 to 16    I told him in almost all cases, the stent is in for less than week, so he should be good for that June 23 vacation date

## 2023-06-01 ENCOUNTER — APPOINTMENT (OUTPATIENT)
Dept: LAB | Facility: HOSPITAL | Age: 78
End: 2023-06-01
Payer: COMMERCIAL

## 2023-06-01 ENCOUNTER — HOSPITAL ENCOUNTER (OUTPATIENT)
Dept: RADIOLOGY | Facility: HOSPITAL | Age: 78
Discharge: HOME | End: 2023-06-01

## 2023-06-01 DIAGNOSIS — N20.1 CALCULUS OF URETER: ICD-10-CM

## 2023-06-01 DIAGNOSIS — N20.1 URETERAL CALCULUS: ICD-10-CM

## 2023-06-01 LAB
ALBUMIN SERPL BCP-MCNC: 4 G/DL (ref 3.5–5)
ALP SERPL-CCNC: 56 U/L (ref 34–104)
ALT SERPL W P-5'-P-CCNC: 15 U/L (ref 7–52)
ANION GAP SERPL CALCULATED.3IONS-SCNC: 8 MMOL/L (ref 4–13)
AST SERPL W P-5'-P-CCNC: 19 U/L (ref 13–39)
ATRIAL RATE: 70 BPM
BASOPHILS # BLD AUTO: 0.04 THOUSANDS/ÂΜL (ref 0–0.1)
BASOPHILS NFR BLD AUTO: 1 % (ref 0–1)
BILIRUB SERPL-MCNC: 0.36 MG/DL (ref 0.2–1)
BUN SERPL-MCNC: 33 MG/DL (ref 5–25)
CALCIUM SERPL-MCNC: 9.4 MG/DL (ref 8.4–10.2)
CHLORIDE SERPL-SCNC: 109 MMOL/L (ref 96–108)
CO2 SERPL-SCNC: 24 MMOL/L (ref 21–32)
CREAT SERPL-MCNC: 1.23 MG/DL (ref 0.6–1.3)
EOSINOPHIL # BLD AUTO: 0.25 THOUSAND/ÂΜL (ref 0–0.61)
EOSINOPHIL NFR BLD AUTO: 4 % (ref 0–6)
ERYTHROCYTE [DISTWIDTH] IN BLOOD BY AUTOMATED COUNT: 15.3 % (ref 11.6–15.1)
GFR SERPL CREATININE-BSD FRML MDRD: 57 ML/MIN/1.73SQ M
GLUCOSE SERPL-MCNC: 129 MG/DL (ref 65–140)
HCT VFR BLD AUTO: 42.6 % (ref 36.5–49.3)
HGB BLD-MCNC: 14.2 G/DL (ref 12–17)
IMM GRANULOCYTES # BLD AUTO: 0.02 THOUSAND/UL (ref 0–0.2)
IMM GRANULOCYTES NFR BLD AUTO: 0 % (ref 0–2)
LYMPHOCYTES # BLD AUTO: 1.52 THOUSANDS/ÂΜL (ref 0.6–4.47)
LYMPHOCYTES NFR BLD AUTO: 26 % (ref 14–44)
MCH RBC QN AUTO: 30.7 PG (ref 26.8–34.3)
MCHC RBC AUTO-ENTMCNC: 33.3 G/DL (ref 31.4–37.4)
MCV RBC AUTO: 92 FL (ref 82–98)
MONOCYTES # BLD AUTO: 0.61 THOUSAND/ÂΜL (ref 0.17–1.22)
MONOCYTES NFR BLD AUTO: 10 % (ref 4–12)
NEUTROPHILS # BLD AUTO: 3.53 THOUSANDS/ÂΜL (ref 1.85–7.62)
NEUTS SEG NFR BLD AUTO: 59 % (ref 43–75)
NRBC BLD AUTO-RTO: 0 /100 WBCS
P AXIS: 22 DEGREES
PLATELET # BLD AUTO: 177 THOUSANDS/UL (ref 149–390)
PMV BLD AUTO: 11.2 FL (ref 8.9–12.7)
POTASSIUM SERPL-SCNC: 3.9 MMOL/L (ref 3.5–5.3)
PR INTERVAL: 198 MS
PROT SERPL-MCNC: 6.6 G/DL (ref 6.4–8.4)
QRS AXIS: 2 DEGREES
QRSD INTERVAL: 88 MS
QT INTERVAL: 390 MS
QTC INTERVAL: 421 MS
RBC # BLD AUTO: 4.63 MILLION/UL (ref 3.88–5.62)
SODIUM SERPL-SCNC: 141 MMOL/L (ref 135–147)
T WAVE AXIS: 17 DEGREES
VENTRICULAR RATE: 70 BPM
WBC # BLD AUTO: 5.97 THOUSAND/UL (ref 4.31–10.16)

## 2023-06-01 PROCEDURE — 85025 COMPLETE CBC W/AUTO DIFF WBC: CPT

## 2023-06-01 PROCEDURE — 80053 COMPREHEN METABOLIC PANEL: CPT

## 2023-06-01 PROCEDURE — 87086 URINE CULTURE/COLONY COUNT: CPT

## 2023-06-01 PROCEDURE — 36415 COLL VENOUS BLD VENIPUNCTURE: CPT

## 2023-06-01 PROCEDURE — 93005 ELECTROCARDIOGRAM TRACING: CPT

## 2023-06-01 PROCEDURE — 93010 ELECTROCARDIOGRAM REPORT: CPT

## 2023-06-02 LAB — BACTERIA UR CULT: NORMAL

## 2023-06-07 NOTE — PRE-PROCEDURE INSTRUCTIONS
Pre-Surgery Instructions:   Medication Instructions   • allopurinol (ZYLOPRIM) 300 mg tablet Instructed to take per normal schedule including DOS with sips water   • amLODIPine (NORVASC) 10 mg tablet Instructed to take per normal schedule including DOS with sips water   • atorvastatin (LIPITOR) 10 mg tablet Instructed to take per normal schedule including DOS with sips water   • carboxymethylcellulose 1 % ophthalmic solution Instructed to take per normal schedule except DOS   • Diclofenac Sodium (VOLTAREN) 1 % Instructed to avoid NSAIDs 3 days prior to surgery   • Fluticasone Propionate (FLONASE ALLERGY RELIEF NA) Instructed to take per normal schedule including DOS   • gabapentin (NEURONTIN) 300 mg capsule Instructed to take per normal schedule including DOS with sips water   • losartan (COZAAR) 25 mg tablet Instructed to take per normal schedule except DOS   • omeprazole (PriLOSEC) 20 mg delayed release capsule Instructed to take per normal schedule including DOS with sips water   • oxyCODONE-acetaminophen (PERCOCET) 5-325 mg per tablet Instructed to take as needed including DOS   • tamsulosin (FLOMAX) 0 4 mg Instructed to take per normal schedule including DOS with sips water   • timolol (TIMOPTIC) 0 5 % ophthalmic solution Take per normal schedule     Medication instructions for day surgery reviewed  Please use only a sip of water to take your instructed medications  Avoid all over the counter vitamins, supplements and NSAIDS for one week prior to surgery per anesthesia guidelines  Tylenol is ok to take as needed  You will receive a call one business day prior to surgery with an arrival time and hospital directions  If your surgery is scheduled on a Monday, the hospital will be calling you on the Friday prior to your surgery  If you have not heard from anyone by 8pm, please call the hospital supervisor through the hospital  at [phone]  [facility] [phone])      Do not eat or drink anything after midnight the night before your surgery, including candy, mints, lifesavers, or chewing gum  Do not drink alcohol 24hrs before your surgery  Try not to smoke at least 24hrs before your surgery  Follow the pre surgery showering instructions as listed in the [facility] Surgical Experience Booklet” or otherwise provided by your surgeon's office  Do not shave the surgical area 24 hours before surgery  Do not apply any lotions, creams, including makeup, cologne, deodorant, or perfumes after showering on the day of your surgery  No contact lenses, eye make-up, or artificial eyelashes  Remove nail polish, including gel polish, and any artificial, gel, or acrylic nails if possible  Remove all jewelry including rings and body piercing jewelry  Wear causal clothing that is easy to take on and off  Consider your type of surgery  Keep any valuables, jewelry, piercings at home  Please bring any specially ordered equipment (sling, braces) if indicated  Arrange for a responsible person to drive you to and from the hospital on the day of your surgery  Visitor Guidelines discussed  Call the surgeon's office with any new illnesses, exposures, or additional questions prior to surgery  Please reference your [facility] Surgical Experience Booklet” for additional information to prepare for your upcoming surgery

## 2023-06-12 ENCOUNTER — ANESTHESIA EVENT (OUTPATIENT)
Dept: PERIOP | Facility: HOSPITAL | Age: 78
End: 2023-06-12
Payer: COMMERCIAL

## 2023-06-12 NOTE — H&P
HISTORY AND PHYSICAL  ? ? Patient Name: Sumit Cadet  Patient MRN: [id number]  Attending Provider: Alex Cleary MD  Service: Urology  Chief Complaint    Right ureteral stone    HPI   Sumit Cadet is a 78 y o  male with a right ureteral stone  I plan cystoscopy, right retrograde pyelography, right ureteroscopy with laser lithotripsy and right stent placement  Potential risks and complications discussed, and informed consent was given by the patient  Medications  Meds/Allergies   sodium chloride, 125 mL/hr, Last Rate: 125 mL/hr (06/13/23 1152)        Prior to Admission Medications   Prescriptions Last Dose Informant Patient Reported? Taking?    Diclofenac Sodium (VOLTAREN) 1 % 6/12/2023 Self Yes Yes   Sig: diclofenac 1 % topical gel   APPLY 2 GRAM TO THE AFFECTED AREA(S) BY TOPICAL ROUTE EVERY 6 HOURS AS NEEDED   Fluticasone Propionate (FLONASE ALLERGY RELIEF NA) 6/10/2023 Self Yes Yes   Sig: into each nostril as needed   allopurinol (ZYLOPRIM) 300 mg tablet 6/13/2023 at 0700 Self No Yes   Sig: Take 1 tablet (300 mg total) by mouth daily   amLODIPine (NORVASC) 10 mg tablet 6/13/2023 at 0700 Self No Yes   Sig: TAKE 1 TABLET BY MOUTH EVERY DAY   atorvastatin (LIPITOR) 10 mg tablet 6/13/2023 at 0700 Self No Yes   Sig: Take 1 tablet (10 mg total) by mouth daily   carboxymethylcellulose 1 % ophthalmic solution   Yes Yes   Sig: Apply 1 drop to eye   gabapentin (NEURONTIN) 300 mg capsule 6/12/2023 at 2100 Self Yes Yes   Sig: Take 300 mg by mouth daily at bedtime   losartan (COZAAR) 25 mg tablet 6/12/2023 Self No Yes   Sig: TAKE 1 TABLET BY MOUTH EVERY DAY   Patient taking differently: 50 mg Taking 50 mg   metroNIDAZOLE (METROGEL) 1 % gel 6/12/2023 Self No Yes   Sig: Apply topically daily   omeprazole (PriLOSEC) 20 mg delayed release capsule 6/13/2023 at 0700 Self No Yes   Sig: Take 1 capsule (20 mg total) by mouth daily   oxyCODONE-acetaminophen (PERCOCET) 5-325 mg per tablet 6/10/2023  No No   Sig: Take 1-2 tablets by mouth every 6 (six) hours as needed for moderate pain or severe pain Max Daily Amount: 8 tablets   tamsulosin (FLOMAX) 0 4 mg 6/12/2023  No Yes   Sig: TAKE 1 CAPSULE BY MOUTH AT BEDTIME   timolol (TIMOPTIC) 0 5 % ophthalmic solution 6/13/2023 at 0700 Self Yes Yes   Sig: Administer 1 drop to both eyes 2 (two) times a day       Facility-Administered Medications: None       Current Facility-Administered Medications:   •  ceFAZolin (ANCEF) IVPB (premix in dextrose) 2,000 mg 50 mL, 2,000 mg, Intravenous, Once, Mahendra Quarles MD  •  sodium chloride 0 9 % infusion, 125 mL/hr, Intravenous, Continuous, Maddison Bryant DO, Last Rate: 125 mL/hr at 06/13/23 1152, 125 mL/hr at 06/13/23 1152  Review of Systems  10 point review of systems negative except as noted in HPI  Allergies  No Known Allergies  PMH  Past Medical History:   Diagnosis Date   • Hiatal hernia    • Hyperlipidemia    • Hypertension    • Kidney stone    • Left inguinal hernia    • Lyme disease     last assessed 7/10/2017   • Peripheral neuropathy      Past surgical history  Past Surgical History:   Procedure Laterality Date   • ABDOMINAL SURGERY     • CATARACT EXTRACTION     • COLONOSCOPY      last assessed 6/17/2015   • DUPUYTREN / PALMAR FASCIOTOMY      last assessed 6/17/2015   • DUPUYTREN CONTRACTURE RELEASE Left    • ESOPHAGOGASTRODUODENOSCOPY     • HERNIA REPAIR Right     inguinal   • HERNIA REPAIR Left 2/9/2017    Procedure: REPAIR HERNIA INGUINAL, LAPAROSCOPIC WITH MESH;  Surgeon: Yuliya Lopez MD;  Location: [facility] OR;  Service:    • INGUINAL HERNIA REPAIR Bilateral 2011    with mesh done at [address] [place] [address]      hip replacement   • LITHOTRIPSY     • AK COLONOSCOPY FLX DX W/COLLJ SPEC WHEN PFRMD N/A 10/31/2018    Procedure: EGD AND COLONOSCOPY;  Surgeon: April Reeder MD;  Location: [facility] GI LAB;   Service: Gastroenterology   • AK CYSTO/URETERO W/LITHOTRIPSY &INDWELL STENT INSRT Left 9/22/2020    Procedure: "CYSTOSCOPY URETEROSCOPY WITH LITHOTRIPSY HOLMIUM LASER, RETROGRADE PYELOGRAM AND INSERTION STENT URETERAL;  Surgeon: Jimmy Orozco MD;  Location: [address] [place] [address];  Service: Urology   • TONSILLECTOMY     • TOTAL HIP ARTHROPLASTY Right     last assessed 12/5/2014     Social history  Social History     Tobacco Use   • Smoking status: Never   • Smokeless tobacco: Never   • Tobacco comments:     per allscripts ' former smoker / never a smoker '   Vaping Use   • Vaping Use: Never used   Substance Use Topics   • Alcohol use: Yes     Alcohol/week: 7 0 standard drinks of alcohol     Types: 7 Glasses of wine per week     Comment: one per day   • Drug use: No     ?  Physical Exam      BP (!) 174/92 (BP Location: Right arm)   Pulse (!) 54   Temp 98 3 °F (36 8 °C) (Temporal)   Resp 16   Ht 5' 6\" (1 676 m)   Wt 72 5 kg (159 lb 13 3 oz)   SpO2 96%   BMI 25 80 kg/m²   General appearance: alert and oriented, in no acute distress  Head: Normocephalic, without obvious abnormality, atraumatic  Neck: no JVD and supple, symmetrical, trachea midline  Back: symmetric, no curvature  ROM normal  No CVA tenderness    Lungs: clear to auscultation bilaterally  Heart: regular rate and rhythm  Abdomen: soft, non-tender; bowel sounds normal; no masses,  no organomegaly  Extremities: extremities normal, warm and well-perfused; no cyanosis, clubbing, or edema  Neurologic: Grossly normal  Mellisa Greco MD   "

## 2023-06-13 ENCOUNTER — TELEPHONE (OUTPATIENT)
Dept: UROLOGY | Facility: MEDICAL CENTER | Age: 78
End: 2023-06-13

## 2023-06-13 ENCOUNTER — HOSPITAL ENCOUNTER (OUTPATIENT)
Facility: HOSPITAL | Age: 78
Setting detail: OUTPATIENT SURGERY
Discharge: HOME | End: 2023-06-13
Attending: UROLOGY | Admitting: UROLOGY
Payer: COMMERCIAL

## 2023-06-13 ENCOUNTER — APPOINTMENT (OUTPATIENT)
Dept: RADIOLOGY | Facility: HOSPITAL | Age: 78
End: 2023-06-13
Payer: COMMERCIAL

## 2023-06-13 ENCOUNTER — ANESTHESIA (OUTPATIENT)
Dept: PERIOP | Facility: HOSPITAL | Age: 78
End: 2023-06-13
Payer: COMMERCIAL

## 2023-06-13 VITALS
RESPIRATION RATE: 16 BRPM | HEIGHT: 66 IN | DIASTOLIC BLOOD PRESSURE: 75 MMHG | BODY MASS INDEX: 25.69 KG/M2 | TEMPERATURE: 97.9 F | WEIGHT: 159.83 LBS | HEART RATE: 95 BPM | OXYGEN SATURATION: 98 % | SYSTOLIC BLOOD PRESSURE: 157 MMHG

## 2023-06-13 DIAGNOSIS — N20.1 URETERAL CALCULUS: ICD-10-CM

## 2023-06-13 PROCEDURE — 82360 CALCULUS ASSAY QUANT: CPT | Performed by: UROLOGY

## 2023-06-13 PROCEDURE — 52356 CYSTO/URETERO W/LITHOTRIPSY: CPT | Performed by: UROLOGY

## 2023-06-13 PROCEDURE — C2617 STENT, NON-COR, TEM W/O DEL: HCPCS | Performed by: UROLOGY

## 2023-06-13 PROCEDURE — 74420 UROGRAPHY RTRGR +-KUB: CPT

## 2023-06-13 PROCEDURE — C1769 GUIDE WIRE: HCPCS | Performed by: UROLOGY

## 2023-06-13 PROCEDURE — C1747 URETEROSCOPE DIGITAL FLEX SNGL USE STD DEFLECTION APTRA: HCPCS | Performed by: UROLOGY

## 2023-06-13 PROCEDURE — NC001 PR NO CHARGE: Performed by: UROLOGY

## 2023-06-13 DEVICE — VARIABLE LENGTH INJECTION STENT SET
Type: IMPLANTABLE DEVICE | Site: URETER | Status: FUNCTIONAL
Brand: CONTOUR VL™ INJECTION STENT SET

## 2023-06-13 RX ORDER — ONDANSETRON 2 MG/ML
4 INJECTION INTRAMUSCULAR; INTRAVENOUS ONCE AS NEEDED
Status: DISCONTINUED | OUTPATIENT
Start: 2023-06-13 | End: 2023-06-13 | Stop reason: HOSPADM

## 2023-06-13 RX ORDER — PHENAZOPYRIDINE HYDROCHLORIDE 200 MG/1
200 TABLET, FILM COATED ORAL 3 TIMES DAILY PRN
Qty: 10 TABLET | Refills: 0 | Status: SHIPPED | OUTPATIENT
Start: 2023-06-13

## 2023-06-13 RX ORDER — OXYCODONE HYDROCHLORIDE 5 MG/1
TABLET ORAL
Qty: 8 TABLET | Refills: 0 | Status: SHIPPED | OUTPATIENT
Start: 2023-06-13

## 2023-06-13 RX ORDER — PHENAZOPYRIDINE HYDROCHLORIDE 200 MG/1
200 TABLET, FILM COATED ORAL ONCE AS NEEDED
Status: COMPLETED | OUTPATIENT
Start: 2023-06-13 | End: 2023-06-13

## 2023-06-13 RX ORDER — CEPHALEXIN 500 MG/1
500 CAPSULE ORAL EVERY 12 HOURS SCHEDULED
Qty: 10 CAPSULE | Refills: 0 | Status: SHIPPED | OUTPATIENT
Start: 2023-06-13 | End: 2023-06-18

## 2023-06-13 RX ORDER — FENTANYL CITRATE/PF 50 MCG/ML
25 SYRINGE (ML) INJECTION
Status: DISCONTINUED | OUTPATIENT
Start: 2023-06-13 | End: 2023-06-13 | Stop reason: HOSPADM

## 2023-06-13 RX ORDER — PROPOFOL 10 MG/ML
INJECTION, EMULSION INTRAVENOUS AS NEEDED
Status: DISCONTINUED | OUTPATIENT
Start: 2023-06-13 | End: 2023-06-13

## 2023-06-13 RX ORDER — OXYCODONE HYDROCHLORIDE 5 MG/1
5 TABLET ORAL EVERY 4 HOURS PRN
Status: DISCONTINUED | OUTPATIENT
Start: 2023-06-13 | End: 2023-06-13 | Stop reason: HOSPADM

## 2023-06-13 RX ORDER — SODIUM CHLORIDE 9 MG/ML
INJECTION, SOLUTION INTRAVENOUS AS NEEDED
Status: DISCONTINUED | OUTPATIENT
Start: 2023-06-13 | End: 2023-06-13 | Stop reason: HOSPADM

## 2023-06-13 RX ORDER — MAGNESIUM HYDROXIDE 1200 MG/15ML
LIQUID ORAL AS NEEDED
Status: DISCONTINUED | OUTPATIENT
Start: 2023-06-13 | End: 2023-06-13 | Stop reason: HOSPADM

## 2023-06-13 RX ORDER — LIDOCAINE HYDROCHLORIDE 10 MG/ML
INJECTION, SOLUTION EPIDURAL; INFILTRATION; INTRACAUDAL; PERINEURAL AS NEEDED
Status: DISCONTINUED | OUTPATIENT
Start: 2023-06-13 | End: 2023-06-13

## 2023-06-13 RX ORDER — ACETAMINOPHEN 325 MG/1
975 TABLET ORAL EVERY 6 HOURS SCHEDULED
Status: DISCONTINUED | OUTPATIENT
Start: 2023-06-13 | End: 2023-06-13 | Stop reason: HOSPADM

## 2023-06-13 RX ORDER — ONDANSETRON 2 MG/ML
INJECTION INTRAMUSCULAR; INTRAVENOUS AS NEEDED
Status: DISCONTINUED | OUTPATIENT
Start: 2023-06-13 | End: 2023-06-13

## 2023-06-13 RX ORDER — ONDANSETRON 2 MG/ML
4 INJECTION INTRAMUSCULAR; INTRAVENOUS EVERY 6 HOURS PRN
Status: DISCONTINUED | OUTPATIENT
Start: 2023-06-13 | End: 2023-06-13 | Stop reason: HOSPADM

## 2023-06-13 RX ORDER — SODIUM CHLORIDE 9 MG/ML
125 INJECTION, SOLUTION INTRAVENOUS CONTINUOUS
Status: DISCONTINUED | OUTPATIENT
Start: 2023-06-13 | End: 2023-06-13 | Stop reason: HOSPADM

## 2023-06-13 RX ORDER — HYDRALAZINE HYDROCHLORIDE 20 MG/ML
10 INJECTION INTRAMUSCULAR; INTRAVENOUS ONCE
Status: COMPLETED | OUTPATIENT
Start: 2023-06-13 | End: 2023-06-13

## 2023-06-13 RX ORDER — HYDRALAZINE HYDROCHLORIDE 20 MG/ML
20 INJECTION INTRAMUSCULAR; INTRAVENOUS ONCE
Status: COMPLETED | OUTPATIENT
Start: 2023-06-13 | End: 2023-06-13

## 2023-06-13 RX ORDER — CEFAZOLIN SODIUM 2 G/50ML
2000 SOLUTION INTRAVENOUS ONCE
Status: COMPLETED | OUTPATIENT
Start: 2023-06-13 | End: 2023-06-13

## 2023-06-13 RX ORDER — EPHEDRINE SULFATE 50 MG/ML
INJECTION INTRAVENOUS AS NEEDED
Status: DISCONTINUED | OUTPATIENT
Start: 2023-06-13 | End: 2023-06-13

## 2023-06-13 RX ORDER — MIDAZOLAM HYDROCHLORIDE 2 MG/2ML
INJECTION, SOLUTION INTRAMUSCULAR; INTRAVENOUS AS NEEDED
Status: DISCONTINUED | OUTPATIENT
Start: 2023-06-13 | End: 2023-06-13

## 2023-06-13 RX ORDER — FENTANYL CITRATE 50 UG/ML
INJECTION, SOLUTION INTRAMUSCULAR; INTRAVENOUS AS NEEDED
Status: DISCONTINUED | OUTPATIENT
Start: 2023-06-13 | End: 2023-06-13

## 2023-06-13 RX ORDER — GLYCOPYRROLATE 0.2 MG/ML
INJECTION INTRAMUSCULAR; INTRAVENOUS AS NEEDED
Status: DISCONTINUED | OUTPATIENT
Start: 2023-06-13 | End: 2023-06-13

## 2023-06-13 RX ADMIN — FENTANYL CITRATE 25 MCG: 50 INJECTION INTRAMUSCULAR; INTRAVENOUS at 14:18

## 2023-06-13 RX ADMIN — EPHEDRINE SULFATE 10 MG: 50 INJECTION, SOLUTION INTRAVENOUS at 13:23

## 2023-06-13 RX ADMIN — HYDRALAZINE HYDROCHLORIDE 20 MG: 20 INJECTION, SOLUTION INTRAMUSCULAR; INTRAVENOUS at 16:25

## 2023-06-13 RX ADMIN — SODIUM CHLORIDE 125 ML/HR: 0.9 INJECTION, SOLUTION INTRAVENOUS at 16:44

## 2023-06-13 RX ADMIN — PROPOFOL 150 MG: 10 INJECTION, EMULSION INTRAVENOUS at 12:55

## 2023-06-13 RX ADMIN — SODIUM CHLORIDE: 0.9 INJECTION, SOLUTION INTRAVENOUS at 13:25

## 2023-06-13 RX ADMIN — FENTANYL CITRATE 25 MCG: 50 INJECTION, SOLUTION INTRAMUSCULAR; INTRAVENOUS at 17:27

## 2023-06-13 RX ADMIN — FENTANYL CITRATE 25 MCG: 50 INJECTION INTRAMUSCULAR; INTRAVENOUS at 12:59

## 2023-06-13 RX ADMIN — MIDAZOLAM 2 MG: 1 INJECTION INTRAMUSCULAR; INTRAVENOUS at 12:44

## 2023-06-13 RX ADMIN — FENTANYL CITRATE 25 MCG: 50 INJECTION INTRAMUSCULAR; INTRAVENOUS at 14:44

## 2023-06-13 RX ADMIN — FENTANYL CITRATE 25 MCG: 50 INJECTION INTRAMUSCULAR; INTRAVENOUS at 13:16

## 2023-06-13 RX ADMIN — ONDANSETRON 4 MG: 2 INJECTION INTRAMUSCULAR; INTRAVENOUS at 12:56

## 2023-06-13 RX ADMIN — GLYCOPYRROLATE 0.2 MG: 0.2 INJECTION, SOLUTION INTRAMUSCULAR; INTRAVENOUS at 13:23

## 2023-06-13 RX ADMIN — PHENAZOPYRIDINE HYDROCHLORIDE 200 MG: 100 TABLET ORAL at 15:39

## 2023-06-13 RX ADMIN — OXYCODONE HYDROCHLORIDE 5 MG: 5 TABLET ORAL at 18:16

## 2023-06-13 RX ADMIN — LIDOCAINE HYDROCHLORIDE 3 ML: 10 INJECTION, SOLUTION EPIDURAL; INFILTRATION; INTRACAUDAL; PERINEURAL at 12:55

## 2023-06-13 RX ADMIN — SODIUM CHLORIDE 125 ML/HR: 0.9 INJECTION, SOLUTION INTRAVENOUS at 11:52

## 2023-06-13 RX ADMIN — FENTANYL CITRATE 25 MCG: 50 INJECTION INTRAMUSCULAR; INTRAVENOUS at 13:38

## 2023-06-13 RX ADMIN — FENTANYL CITRATE 25 MCG: 50 INJECTION INTRAMUSCULAR; INTRAVENOUS at 13:15

## 2023-06-13 RX ADMIN — HYDRALAZINE HYDROCHLORIDE 10 MG: 20 INJECTION INTRAMUSCULAR; INTRAVENOUS at 15:56

## 2023-06-13 RX ADMIN — CEFAZOLIN SODIUM 2000 MG: 2 SOLUTION INTRAVENOUS at 12:40

## 2023-06-13 NOTE — TELEPHONE ENCOUNTER
Patient had a low-grade appearing bladder lesion that was small that was not addressed at the time of cystoscopy today  When you arrange a follow-up visit he should have a repeat cystoscopy to see if this persists

## 2023-06-13 NOTE — OP NOTE
OPERATIVE REPORT  PATIENT NAME: Nicola Hutchinson    :  1945  MRN: [id number]  Pt Location: [facility] OR ROOM 06    SURGERY DATE: 2023    Surgeon(s) and Role:     * Gwendolyn Rivera MD - Primary    Preop Diagnosis:  Ureteral calculus [N20 1]    Post-Op Diagnosis Codes:     * Right ureteral stone [N20 1]     * Left renal stone [N20 0]    Procedure(s):  Bilateral - CYSTOSCOPY  UTEROSCOPY  LITHOTRIPSY  HOLIUM LASER  STENT INSERTION; RETROGRADE PYELOGRAM; BASKET STONE EXTRACTION    Specimen(s):  ID Type Source Tests Collected by Time Destination   A : Right kidney stones Calculus Kidney, Right STONE ANALYSIS Gwendolyn Rivera MD 2023 1435        Estimated Blood Loss:   Minimal    Drains:  Ureteral Internal Stent Right ureter 6 Fr  (Active)   Number of days: 0       Ureteral Internal Stent Left ureter 6 Fr  (Active)   Number of days: 0       Anesthesia Type:   General/LMA    Operative Indications:  Ureteral calculus [N20 1] Right  Left flank pain- left renal stones    Operative Findings:  Normal urethra with bilobar prostatic hypertrophy causing complete outflow obstruction  Ureteral orifice ease are unremarkable  Bladder is moderately trabeculated  There was a less than 1 cm low-grade raised area on the posterior bladder wall that was not addressed at this time  Preliminary fluoroscopic images reveals a calcification in the area of the distal right ureter around the level of the pelvic vessels  This is confirmed on retrograde pyelography to be the ureteral stone noted on CAT scan  Ureteroscopy and laser lithotripsy of the stone were performed and stone fragments basketed for analysis  No other stones were identified in the upper ureter or in the kidney  There were areas of large Andre's plaques noted in the kidney  The patient had complained of having left flank pain in the holding area and requested evaluation  No ureteral stone was identified    There were 2 stones in the midpole calyx on ureteroscopy and these were dusted  6 Mongolian Contour stents were placed bilaterally  Dangler's were left long and taped to the penis  Complications:   None    Procedure and Technique:  PLAN FOR STENT: Removal by patient or nursing staff in 10 days  The patient was brought into the OR, properly identified and positioned on the table  General anesthesia was administered and the patient was placed in lithotomy position and prepped and draped in the usual sterile fashion  Compression boots were employed  Intravenous antibiotic was administered  An appropriate time-out was performed  Cystoscopy was performed with a 25 Mongolian cystoscope with findings as above  The right ureteral orifice was identified and retrograde pyelogram performed with findings as above  A guidewire was placed up the ureter under fluoroscopic guidance  The rigid ureteroscope was introduced and carefully advanced up to stone  The stone was too large to basket  The Holmium laser fiber was introduced thru the scope and advanced to the edge of stone  Using various laser settings, stone was lasered into numerous small particles  Care was taken not to laser tissue  It became difficult to laser the stone with the short semirigid ureteroscope  An additional wire was placed and a 10-12 Western Saumya access sheath placed up to the area of the stones under fluoroscopic guidance  The disposable flexible ureteroscope was then employed  Ureteroscopy was performed and the stone broken up further with the laser  Large particles were basketed  All other remaining particles appeared small enough to pass around the stent  I then proceeded to perform ureteroscopy up the ureter and into the kidney  Contrast was injected and systematic examination of the renal collecting system performed  No additional stones were identified  The ureteroscope  was removed from the ureter in conjunction with the access sheath    No damage was noted to the ureter nor were there any ureteral stones  The cystoscope was used to place a 6F Contour VL stent in the ureter, with the upper coils in the renal pelvis, and the distal coil in the bladder  Retrograde pyelogram on that side confirmed good position and no extravasation of contrast      Attention was then turned to the left side where the tiger tail catheter and dilute Omnipaque were utilized to perform a left retrograde pyelogram   There did appear to be some irregularity in the upper ureter and I could not definitely exclude a stone  The patient noted he had been symptomatic on that side  A guidewire was passed up into the kidney and the 10-12 Micronesian access sheath placed over the guidewire into the mid ureter  The disposable flexible ureteroscope was and ureteroscopy performed  No stone was identified in the ureter  Contrast was injected and systematic examination of the collecting system performed  There were 2 stones in the midpole calyx that appeared likely to pass in the near future  Using the 272 µm fiber the holmium laser was used on dusting settings to dust these stones into tiny fragments  Did not appear that any were large enough to require basketing  No other stones were identified in the left collecting system  The guidewire was replaced and the ureteroscope withdrawn and tandem with the access sheath  No injury to the ureter or ureteral stone was identified  The wire was backloaded through the cystoscope and a a 6 Western Saumya Contour stent was then placed in standard fashion  It was seen to be coiled in good position in the renal pelvis and in the bladder  The bladder was drained and cystoscope removed  The Dangler's were taped to the penis  Patient tolerated the procedure well  He was awakened from anesthesia and taken to the recovery in satisfactory condition  The patient was awaken from anesthesia and taken to the PACU in good condition     I was present for the entire procedure      Patient Disposition:  PACU  and hemodynamically stable        SIGNATURE: Guerita Linda MD  DATE: June 13, 2023  TIME: 4:21 PM

## 2023-06-13 NOTE — ANESTHESIA POSTPROCEDURE EVALUATION
"Post-Op Assessment Note    CV Status:  Stable    Pain management: adequate     Mental Status:  Alert and awake   Hydration Status:  Euvolemic   PONV Controlled:  Controlled   Airway Patency:  Patent   Two or more mitigation strategies used for obstructive sleep apnea   Post Op Vitals Reviewed: Yes      Staff: Anesthesiologist         No notable events documented      BP      Temp      Pulse     Resp      SpO2      /83   Pulse 90   Temp 97 9 °F (36 6 °C) (Temporal)   Resp 16   Ht 5' 6\" (1 676 m)   Wt 72 5 kg (159 lb 13 3 oz)   SpO2 94%   BMI 25 80 kg/m²     "

## 2023-06-13 NOTE — ANESTHESIA PREPROCEDURE EVALUATION
Procedure:  CYSTO U-SCOPE, LASER, RETROGRADE; STENT (Right: Bladder)    Relevant Problems   CARDIO   (+) Essential hypertension      GI/HEPATIC   (+) Gastroesophageal reflux disease without esophagitis      /RENAL   (+) Nephrolithiasis   (+) Prostatitis, chronic      MUSCULOSKELETAL   (+) Chronic gout   (+) Osteoarthritis of spine with radiculopathy, lumbar region      NEURO/PSYCH   (+) Diplopia      Other   (+) Dyslipidemia        Physical Exam    Airway    Mallampati score: II  TM Distance: >3 FB  Neck ROM: full     Dental   No notable dental hx     Cardiovascular  Rhythm: regular, Rate: normal, Cardiovascular exam normal    Pulmonary  Pulmonary exam normal Breath sounds clear to auscultation,     Other Findings        Anesthesia Plan  ASA Score- 2     Anesthesia Type- general with ASA Monitors  Additional Monitors:   Airway Plan: LMA  Plan Factors-    Chart reviewed  Existing labs reviewed  Patient summary reviewed  Obstructive sleep apnea risk education given perioperatively  Induction-     Postoperative Plan-     Informed Consent- Anesthetic plan and risks discussed with patient

## 2023-06-13 NOTE — INTERVAL H&P NOTE
H&P reviewed  After examining the patient I find no changes in the patients condition since the H&P had been written  Vitals:    06/13/23 1104   BP: (!) 174/92   Pulse: (!) 54   Resp:    Temp:    SpO2:    Procedure reviewed  He is also having intermittent pain on the left  At his request we will retrograde the left side too and consider  Procedure and risks reviewed and informed consent obtained   Laterality marked- right

## 2023-06-13 NOTE — TELEPHONE ENCOUNTER
Patient underwent bilateral ureteroscopy today  He has bilateral stents that can be removed in 7 to 10 days by patient or nursing staff  Please call to arrange stent removal   He will then need a follow-up renal ultrasound and KUB  He should then have a follow-up appointment with an BEKAH

## 2023-06-13 NOTE — INCIDENTAL FINDINGS
The following findings require follow up:  Non-Radiographic finding   Finding: Raised area posterior bladder wall ? papillary lesion   Follow up required: Yes   Follow up should be done within 6-8 week(s)    Please notify the following clinician to assist with the follow up:   Dr Niru Eli

## 2023-06-14 RX ORDER — METHOCARBAMOL 500 MG/1
500 TABLET, FILM COATED ORAL 3 TIMES DAILY PRN
COMMUNITY
Start: 2023-06-08 | End: 2023-06-18

## 2023-06-14 NOTE — TELEPHONE ENCOUNTER
Dipak Gonzales MD 17 hours ago (4:59 PM)       Pt developed post op retention  OK for voiding trial on Thursday

## 2023-06-14 NOTE — TELEPHONE ENCOUNTER
16 hours ago (4:59 PM)     Velia Phelps MD 16 hours ago (4:59 PM)       Pt developed post op retention  OK for voiding trial on Thursday

## 2023-06-14 NOTE — TELEPHONE ENCOUNTER
Post Op Note    Ashanti Carpenter is a 78 y o  male s/p Bilateral - CYSTOSCOPY  UTEROSCOPY  LITHOTRIPSY  HOLIUM LASER  STENT INSERTION; RETROGRADE PYELOGRAM; BASKET STONE EXTRACTION performed 6/13/2023  Ashanti Carpenter is a patient of Dr Terry Marquez and is seen at the [facility] office  How would you rate your pain on a scale from 1 to 10, 10 being the worst pain ever? 1  Have you had a fever? No  Have your bowel movements been regular? No  Do you have any difficulty urinating? No  If the patient has a aaron- are you comfortable caring for your aaron? Yes Is it draining urine? Yes  Do you have any other questions or concerns that I can address at this time? No    I spoke with the patient to see how he was doing since his procedure yesterday  He stated he is doing fine, he is having some blood in his bag which I did let him know is normal but to monitor if it gets really dark or large clots or if its not draining he would need to call us or go to the ER  Encouraged hydration  Void trial scheduled for tomorrow 6/15/23, stent removal scheduled for 6/21/23 and cysto scheduled for July  Juan Low MD  Physician  Specialty:  Urology  Telephone Encounter     Signed  Encounter Date:  6/13/2023     Signed                      Patient had a low-grade appearing bladder lesion that was small that was not addressed at the time of cystoscopy today  When you arrange a follow-up visit he should have a repeat cystoscopy to see if this persists

## 2023-06-15 ENCOUNTER — PROCEDURE VISIT (OUTPATIENT)
Dept: UROLOGY | Facility: MEDICAL CENTER | Age: 78
End: 2023-06-15

## 2023-06-15 VITALS
DIASTOLIC BLOOD PRESSURE: 90 MMHG | SYSTOLIC BLOOD PRESSURE: 160 MMHG | WEIGHT: 155 LBS | HEIGHT: 66 IN | BODY MASS INDEX: 24.91 KG/M2 | HEART RATE: 67 BPM

## 2023-06-15 DIAGNOSIS — N20.1 CALCULUS OF URETER: Primary | ICD-10-CM

## 2023-06-15 PROCEDURE — 99024 POSTOP FOLLOW-UP VISIT: CPT

## 2023-06-15 NOTE — PROGRESS NOTES
"6/15/2023    Desi Kramer  1945  [id number]    Diagnosis  Chief Complaint    Nephrolithiasis         Patient presents for aaron removal managed by Dr Katie Hinds  Return to the office on 6/21/23 for stent removal    Procedure Aaron removal/voiding trial    Aaron catheter removed after deflation of an intact balloon  Patient tolerated well  Encouraged patient to hydrate well and return this afternoon for post void residual   he knows he may return early if uncomfortable and unable to urinate  Patient agrees to this plan  I spoke with the patient to see how he was doing since aaron was removed this morning  He stated he is urinating has a couple times now with a weak stream  He is not coming back this afternoon for a PVR  ER precautions reviewed with him  No results found for this or any previous visit (from the past 4 hour(s))          Vitals:    06/15/23 0855   BP: 160/90   BP Location: Left arm   Patient Position: Standing   Cuff Size: Standard   Pulse: 67   Weight: 70 3 kg (155 lb)   Height: 5' 6\" (1 676 m)           France Zuluaga RN      "

## 2023-06-18 LAB
CALCIUM OXALATE DIHYDRATE MFR STONE IR: 20 %
COLOR STONE: NORMAL
COM MFR STONE: 80 %
COMMENT-STONE3: NORMAL
COMPOSITION: NORMAL
LABORATORY COMMENT REPORT: NORMAL
PHOTO: NORMAL
SIZE STONE: NORMAL MM
SPEC SOURCE SUBJ: NORMAL
STONE ANALYSIS-IMP: NORMAL
STONE ANALYSIS-IMP: NORMAL
WT STONE: 12 MG

## 2023-06-21 ENCOUNTER — PROCEDURE VISIT (OUTPATIENT)
Dept: UROLOGY | Facility: MEDICAL CENTER | Age: 78
End: 2023-06-21

## 2023-06-21 VITALS
HEART RATE: 66 BPM | WEIGHT: 156 LBS | HEIGHT: 66 IN | BODY MASS INDEX: 25.07 KG/M2 | DIASTOLIC BLOOD PRESSURE: 90 MMHG | SYSTOLIC BLOOD PRESSURE: 160 MMHG

## 2023-06-21 DIAGNOSIS — N20.1 CALCULUS OF URETER: Primary | ICD-10-CM

## 2023-06-21 PROCEDURE — 99024 POSTOP FOLLOW-UP VISIT: CPT

## 2023-06-21 NOTE — PROGRESS NOTES
"6/21/2023  Davide Palomo is a 70 y o  male  [id number]        Diagnosis  Chief Complaint    Nephrolithiasis         Patient is s/p left/right ureteroscopy with stone extraction on 6/13/2023 with Dr Olvin Urban  Return to the office on July 31st for cysto scheduled      Procedure Stent with String Removal    Vitals:    06/21/23 1328   BP: 160/90   BP Location: Left arm   Patient Position: Sitting   Cuff Size: Standard   Pulse: 66   Weight: 70 8 kg (156 lb)   Height: 5' 6\" (1 676 m)       Bilateral stent with string removed intact without difficulty  Reviewed post stent removal symptoms including flank pain, dysuria, and hematuria  Instructed patient to increase oral fluid intake  Encouraged the use of NSAIDS and other prescribed pain medication as needed for discomfort  Patient instructed to call the office or report to the ER for uncontrolled pain, fever, chills, nausea or vomiting         Poppy Mendoza RN  "

## 2023-07-02 ENCOUNTER — APPOINTMENT (EMERGENCY)
Dept: CT IMAGING | Facility: HOSPITAL | Age: 78
End: 2023-07-02
Payer: COMMERCIAL

## 2023-07-02 ENCOUNTER — HOSPITAL ENCOUNTER (EMERGENCY)
Facility: HOSPITAL | Age: 78
Discharge: ANOTHER HOSPITAL | End: 2023-07-02
Attending: EMERGENCY MEDICINE
Payer: COMMERCIAL

## 2023-07-02 VITALS
BODY MASS INDEX: 25.07 KG/M2 | HEIGHT: 66 IN | OXYGEN SATURATION: 95 % | RESPIRATION RATE: 19 BRPM | HEART RATE: 76 BPM | WEIGHT: 156 LBS | DIASTOLIC BLOOD PRESSURE: 113 MMHG | TEMPERATURE: 98.3 F | SYSTOLIC BLOOD PRESSURE: 190 MMHG

## 2023-07-02 DIAGNOSIS — S06.5XAA SUBDURAL HEMATOMA (HCC): Primary | ICD-10-CM

## 2023-07-02 LAB
ANION GAP SERPL CALCULATED.3IONS-SCNC: 10 MMOL/L
APTT PPP: 27 SECONDS (ref 23–37)
BASOPHILS # BLD AUTO: 0.03 THOUSANDS/ÂΜL (ref 0–0.1)
BASOPHILS NFR BLD AUTO: 1 % (ref 0–1)
BUN SERPL-MCNC: 22 MG/DL (ref 5–25)
CALCIUM SERPL-MCNC: 9.7 MG/DL (ref 8.4–10.2)
CHLORIDE SERPL-SCNC: 105 MMOL/L (ref 96–108)
CO2 SERPL-SCNC: 24 MMOL/L (ref 21–32)
CREAT SERPL-MCNC: 0.76 MG/DL (ref 0.6–1.3)
EOSINOPHIL # BLD AUTO: 0.17 THOUSAND/ÂΜL (ref 0–0.61)
EOSINOPHIL NFR BLD AUTO: 3 % (ref 0–6)
ERYTHROCYTE [DISTWIDTH] IN BLOOD BY AUTOMATED COUNT: 14.3 % (ref 11.6–15.1)
GFR SERPL CREATININE-BSD FRML MDRD: 88 ML/MIN/1.73SQ M
GLUCOSE SERPL-MCNC: 94 MG/DL (ref 65–140)
HCT VFR BLD AUTO: 46 % (ref 36.5–49.3)
HGB BLD-MCNC: 15.2 G/DL (ref 12–17)
IMM GRANULOCYTES # BLD AUTO: 0.03 THOUSAND/UL (ref 0–0.2)
IMM GRANULOCYTES NFR BLD AUTO: 1 % (ref 0–2)
INR PPP: 0.99 (ref 0.84–1.19)
LYMPHOCYTES # BLD AUTO: 1.16 THOUSANDS/ÂΜL (ref 0.6–4.47)
LYMPHOCYTES NFR BLD AUTO: 18 % (ref 14–44)
MCH RBC QN AUTO: 30.9 PG (ref 26.8–34.3)
MCHC RBC AUTO-ENTMCNC: 33 G/DL (ref 31.4–37.4)
MCV RBC AUTO: 94 FL (ref 82–98)
MONOCYTES # BLD AUTO: 0.5 THOUSAND/ÂΜL (ref 0.17–1.22)
MONOCYTES NFR BLD AUTO: 8 % (ref 4–12)
NEUTROPHILS # BLD AUTO: 4.54 THOUSANDS/ÂΜL (ref 1.85–7.62)
NEUTS SEG NFR BLD AUTO: 69 % (ref 43–75)
NRBC BLD AUTO-RTO: 0 /100 WBCS
PLATELET # BLD AUTO: 160 THOUSANDS/UL (ref 149–390)
PMV BLD AUTO: 10.4 FL (ref 8.9–12.7)
POTASSIUM SERPL-SCNC: 3.6 MMOL/L (ref 3.5–5.3)
PROTHROMBIN TIME: 13.1 SECONDS (ref 11.6–14.5)
RBC # BLD AUTO: 4.92 MILLION/UL (ref 3.88–5.62)
SODIUM SERPL-SCNC: 139 MMOL/L (ref 135–147)
WBC # BLD AUTO: 6.43 THOUSAND/UL (ref 4.31–10.16)

## 2023-07-02 PROCEDURE — 70450 CT HEAD/BRAIN W/O DYE: CPT

## 2023-07-02 PROCEDURE — G1004 CDSM NDSC: HCPCS

## 2023-07-02 PROCEDURE — 72125 CT NECK SPINE W/O DYE: CPT

## 2023-07-02 PROCEDURE — 96375 TX/PRO/DX INJ NEW DRUG ADDON: CPT

## 2023-07-02 PROCEDURE — 90715 TDAP VACCINE 7 YRS/> IM: CPT | Performed by: EMERGENCY MEDICINE

## 2023-07-02 PROCEDURE — 99284 EMERGENCY DEPT VISIT MOD MDM: CPT

## 2023-07-02 PROCEDURE — 80048 BASIC METABOLIC PNL TOTAL CA: CPT | Performed by: EMERGENCY MEDICINE

## 2023-07-02 PROCEDURE — 85730 THROMBOPLASTIN TIME PARTIAL: CPT | Performed by: EMERGENCY MEDICINE

## 2023-07-02 PROCEDURE — 85610 PROTHROMBIN TIME: CPT | Performed by: EMERGENCY MEDICINE

## 2023-07-02 PROCEDURE — 85025 COMPLETE CBC W/AUTO DIFF WBC: CPT | Performed by: EMERGENCY MEDICINE

## 2023-07-02 PROCEDURE — 90471 IMMUNIZATION ADMIN: CPT

## 2023-07-02 PROCEDURE — 96374 THER/PROPH/DIAG INJ IV PUSH: CPT

## 2023-07-02 PROCEDURE — 36415 COLL VENOUS BLD VENIPUNCTURE: CPT | Performed by: EMERGENCY MEDICINE

## 2023-07-02 RX ORDER — HYDRALAZINE HYDROCHLORIDE 20 MG/ML
5 INJECTION INTRAMUSCULAR; INTRAVENOUS ONCE
Status: COMPLETED | OUTPATIENT
Start: 2023-07-02 | End: 2023-07-02

## 2023-07-02 RX ORDER — FENTANYL CITRATE 50 UG/ML
50 INJECTION, SOLUTION INTRAMUSCULAR; INTRAVENOUS ONCE
Status: COMPLETED | OUTPATIENT
Start: 2023-07-02 | End: 2023-07-02

## 2023-07-02 RX ORDER — LABETALOL HYDROCHLORIDE 5 MG/ML
5 INJECTION, SOLUTION INTRAVENOUS ONCE
Status: COMPLETED | OUTPATIENT
Start: 2023-07-02 | End: 2023-07-02

## 2023-07-02 RX ORDER — LABETALOL HYDROCHLORIDE 5 MG/ML
10 INJECTION, SOLUTION INTRAVENOUS ONCE
Status: DISCONTINUED | OUTPATIENT
Start: 2023-07-02 | End: 2023-07-02

## 2023-07-02 RX ADMIN — FENTANYL CITRATE 50 MCG: 50 INJECTION, SOLUTION INTRAMUSCULAR; INTRAVENOUS at 22:29

## 2023-07-02 RX ADMIN — LABETALOL HYDROCHLORIDE 5 MG: 5 INJECTION, SOLUTION INTRAVENOUS at 22:29

## 2023-07-02 RX ADMIN — TETANUS TOXOID, REDUCED DIPHTHERIA TOXOID AND ACELLULAR PERTUSSIS VACCINE, ADSORBED 0.5 ML: 5; 2.5; 8; 8; 2.5 SUSPENSION INTRAMUSCULAR at 21:12

## 2023-07-02 RX ADMIN — HYDRALAZINE HYDROCHLORIDE 5 MG: 20 INJECTION INTRAMUSCULAR; INTRAVENOUS at 21:13

## 2023-07-02 RX ADMIN — LABETALOL HYDROCHLORIDE 5 MG: 5 INJECTION, SOLUTION INTRAVENOUS at 22:06

## 2023-07-03 NOTE — EMTALA/ACUTE CARE TRANSFER
[address] [address]  Dept: [phone]      EMTALA TRANSFER CONSENT    NAME Camille Sharma                                         1945                              MRN [id number]    I have been informed of my rights regarding examination, treatment, and transfer   by Dr. Donal Marcus DO    Benefits: Specialized equipment and/or services available at the receiving facility (Include comment)________________________ (trauma and neurosurgery)    Risks: Potential for delay in receiving treatment, Potential deterioration of medical condition, Loss of IV, Increased discomfort during transfer, Possible worsening of condition or death during transfer      Transfer Request   I acknowledge that my medical condition has been evaluated and explained to me by the emergency department physician or other qualified medical person and/or my attending physician who has recommended and offered to me further medical examination and treatment. I understand the Hospital's obligation with respect to the treatment and stabilization of my emergency medical condition. I nevertheless request to be transferred. I release the Hospital, the doctor, and any other persons caring for me from all responsibility or liability for any injury or ill effects that may result from my transfer and agree to accept all responsibility for the consequences of my choice to transfer, rather than receive stabilizing treatment at the Hospital. I understand that because the transfer is my request, my insurance may not provide reimbursement for the services. The Hospital will assist and direct me and my family in how to make arrangements for transfer, but the hospital is not liable for any fees charged by the transport service.   In spite of this understanding, I refuse to consent to further medical examination and treatment which has been offered to me, and request transfer to Accepting Facility Name, [facility] : [facility]. I authorize the performance of emergency medical procedures and treatments upon me in both transit and upon arrival at the receiving facility. Additionally, I authorize the release of any and all medical records to the receiving facility and request they be transported with me, if possible. I authorize the performance of emergency medical procedures and treatments upon me in both transit and upon arrival at the receiving facility. Additionally, I authorize the release of any and all medical records to the receiving facility and request they be transported with me, if possible. I understand that the safest mode of transportation during a medical emergency is an ambulance and that the Hospital advocates the use of this mode of transport. Risks of traveling to the receiving facility by car, including absence of medical control, life sustaining equipment, such as oxygen, and medical personnel has been explained to me and I fully understand them. (TERRY CORRECT BOX BELOW)  [  ]  I consent to the stated transfer and to be transported by ambulance/helicopter. [  ]  I consent to the stated transfer, but refuse transportation by ambulance and accept full responsibility for my transportation by car. I understand the risks of non-ambulance transfers and I exonerate the Hospital and its staff from any deterioration in my condition that results from this refusal.    X___________________________________________    DATE  23  TIME________  Signature of patient or legally responsible individual signing on patient behalf           RELATIONSHIP TO PATIENT_________________________          Provider Certification    NAME Kinga Hector                                        [facility] 1945                              MRN [id number]    A medical screening exam was performed on the above named patient.   Based on the examination:    Condition Necessitating Transfer The encounter diagnosis was Subdural hematoma ([address] Patient Condition: The patient has been stabilized such that within reasonable medical probability, no material deterioration of the patient condition or the condition of the unborn child(olive) is likely to result from the transfer    Reason for Transfer: Level of Care needed not available at this facility, Patient/Family request (requesting [facility])    Transfer Requirements: [address]   · Space available and qualified personnel available for treatment as acknowledged by    · Agreed to accept transfer and to provide appropriate medical treatment as acknowledged by       Dr. Sukhwinder Gaviria  · Appropriate medical records of the examination and treatment of the patient are provided at the time of transfer   [address] _______  · Transfer will be performed by qualified personnel from    and appropriate transfer equipment as required, including the use of necessary and appropriate life support measures.     Provider Certification: I have examined the patient and explained the following risks and benefits of being transferred/refusing transfer to the patient/family:  General risk, such as traffic hazards, adverse weather conditions, rough terrain or turbulence, possible failure of equipment (including vehicle or aircraft), or consequences of actions of persons outside the control of the transport personnel, Unanticipated needs of medical equipment and personnel during transport, Risk of worsening condition, The possibility of a transport vehicle being unavailable      Based on these reasonable risks and benefits to the patient and/or the unborn child(olive), and based upon the information available at the time of the patient’s examination, I certify that the medical benefits reasonably to be expected from the provision of appropriate medical treatments at another medical facility outweigh the increasing risks, if any, to the individual’s medical condition, and in the case of labor to the unborn child, from effecting the transfer.     X____________________________________________ DATE 07/02/23        TIME_______      ORIGINAL - SEND TO MEDICAL RECORDS   COPY - SEND WITH PATIENT DURING TRANSFER

## 2023-07-03 NOTE — ED PROVIDER NOTES
History  Chief Complaint   Patient presents with   • Head Injury     Pt reports playing pickle ball when feet came out from underneath him, hit head on macadam. Denies h/a, dizziness, blurred vision. Denies LOC or thinners; abrasion to back of head. 56-year-old male presents the ED for head strike earlier today while playing pickle ball he states he tried to run backwards and his feet came out from underneath him striking his head on the ground. Denies loss of consciousness, dizziness, visual changes, nausea or vomiting. Does not take blood thinners or aspirin. Prior to Admission Medications   Prescriptions Last Dose Informant Patient Reported? Taking?    Diclofenac Sodium (VOLTAREN) 1 %  Self Yes No   Sig: diclofenac 1 % topical gel   APPLY 2 GRAM TO THE AFFECTED AREA(S) BY TOPICAL ROUTE EVERY 6 HOURS AS NEEDED   Fluticasone Propionate (FLONASE ALLERGY RELIEF NA)  Self Yes No   Sig: into each nostril as needed   allopurinol (ZYLOPRIM) 300 mg tablet  Self No No   Sig: Take 1 tablet (300 mg total) by mouth daily   amLODIPine (NORVASC) 10 mg tablet  Self No No   Sig: TAKE 1 TABLET BY MOUTH EVERY DAY   atorvastatin (LIPITOR) 10 mg tablet  Self No No   Sig: Take 1 tablet (10 mg total) by mouth daily   gabapentin (NEURONTIN) 300 mg capsule  Self Yes No   Sig: Take 300 mg by mouth daily at bedtime   losartan (COZAAR) 25 mg tablet  Self No No   Sig: TAKE 1 TABLET BY MOUTH EVERY DAY   Patient not taking: Reported on 2023   methocarbamol (ROBAXIN) 500 mg tablet   Yes No   Sig: Take 500 mg by mouth Three times daily as needed   metroNIDAZOLE (METROGEL) 1 % gel  Self No No   Sig: Apply topically daily   omeprazole (PriLOSEC) 20 mg delayed release capsule  Self No No   Sig: Take 1 capsule (20 mg total) by mouth daily   oxyCODONE (ROXICODONE) 5 immediate release tablet   No No   Si tablet p.o. every 6 hours as needed severe pain   Patient not taking: Reported on 2023   phenazopyridine (PYRIDIUM) 200 mg tablet   No No   Sig: Take 1 tablet (200 mg total) by mouth 3 (three) times a day as needed (Dysuria and bladder discomfort. Take with meals.)   Patient not taking: Reported on 6/21/2023   tamsulosin (FLOMAX) 0.4 mg   No No   Sig: TAKE 1 CAPSULE BY MOUTH AT BEDTIME   timolol (TIMOPTIC) 0.5 % ophthalmic solution  Self Yes No   Sig: Administer 1 drop to both eyes 2 (two) times a day       Facility-Administered Medications: None       Past Medical History:   Diagnosis Date   • Hiatal hernia    • Hyperlipidemia    • Hypertension    • Kidney stone    • Left inguinal hernia    • Lyme disease     last assessed 7/10/2017   • Peripheral neuropathy        Past Surgical History:   Procedure Laterality Date   • ABDOMINAL SURGERY     • CATARACT EXTRACTION     • COLONOSCOPY      last assessed 6/17/2015   • DUPUYTREN / PALMAR FASCIOTOMY      last assessed 6/17/2015   • DUPUYTREN CONTRACTURE RELEASE Left    • ESOPHAGOGASTRODUODENOSCOPY     • FL RETROGRADE PYELOGRAM  6/13/2023   • HERNIA REPAIR Right     inguinal   • HERNIA REPAIR Left 2/9/2017    Procedure: REPAIR HERNIA INGUINAL, LAPAROSCOPIC WITH MESH;  Surgeon: Arnie King MD;  Location: [facility] OR;  Service:    • INGUINAL HERNIA REPAIR Bilateral 2011    with mesh done at [place] [address]      hip replacement   • LITHOTRIPSY     • SD COLONOSCOPY FLX DX W/COLLJ SPEC WHEN PFRMD N/A 10/31/2018    Procedure: EGD AND COLONOSCOPY;  Surgeon: Krishna Palacios MD;  Location: [facility] GI LAB;   Service: Gastroenterology   • SD CYSTO/URETERO W/LITHOTRIPSY &INDWELL STENT INSRT Left 9/22/2020    Procedure: CYSTOSCOPY URETEROSCOPY WITH LITHOTRIPSY HOLMIUM LASER, RETROGRADE PYELOGRAM AND INSERTION STENT URETERAL;  Surgeon: Stephon De Luna MD;  Location: [address] [place] [address] MAIN OR;  Service: Urology   • SD CYSTO/URETERO W/LITHOTRIPSY &INDWELL STENT INSRT Bilateral 6/13/2023    Procedure: CYSTOSCOPY, UTEROSCOPY, LITHOTRIPSY, HOLIUM LASER, STENT INSERTION; RETROGRADE PYELOGRAM; BASKET STONE EXTRACTION;  Surgeon: Zenon Uriarte MD;  Location: [facility] OR;  Service: Urology   • TONSILLECTOMY     • TOTAL HIP ARTHROPLASTY Right     last assessed 12/5/2014       Family History   Problem Relation Age of Onset   • Coronary artery disease Mother    • Cancer Father      I have reviewed and agree with the history as documented. E-Cigarette/Vaping   • E-Cigarette Use Never User      E-Cigarette/Vaping Substances     Social History     Tobacco Use   • Smoking status: Never   • Smokeless tobacco: Never   • Tobacco comments:     per allscripts ' former smoker / never a smoker '   Vaping Use   • Vaping Use: Never used   Substance Use Topics   • Alcohol use: Yes     Alcohol/week: 7.0 standard drinks of alcohol     Types: 7 Glasses of wine per week     Comment: one per day   • Drug use: No       Review of Systems   Skin: Positive for wound (top of the scalp). All other systems reviewed and are negative. Physical Exam  Physical Exam  Vitals and nursing note reviewed. Constitutional:       General: He is not in acute distress. Appearance: He is well-developed. He is not diaphoretic. HENT:      Head: Normocephalic. Comments: Abrasion to the superior scalp that is hemostatic at this time. With small hematoma. Right Ear: External ear normal.      Left Ear: External ear normal.      Nose: Nose normal.   Eyes:      General: No scleral icterus. Right eye: No discharge. Left eye: No discharge. Conjunctiva/sclera: Conjunctivae normal.   Cardiovascular:      Rate and Rhythm: Normal rate and regular rhythm. Heart sounds: Normal heart sounds. No murmur heard. No friction rub. No gallop. Pulmonary:      Effort: Pulmonary effort is normal. No respiratory distress. Breath sounds: Normal breath sounds. No wheezing or rales. Abdominal:      General: Bowel sounds are normal. There is no distension. Palpations: Abdomen is soft. There is no mass. Tenderness: There is no abdominal tenderness. There is no guarding. Musculoskeletal:         General: No tenderness or deformity. Normal range of motion. Cervical back: Normal range of motion and neck supple. Skin:     General: Skin is warm and dry. Coloration: Skin is not pale. Findings: No erythema or rash. Neurological:      Mental Status: He is alert and oriented to person, place, and time. Comments: 5/5 strength x 4 extremities  Gross sensation intact  CN intact  GCS 15  No gait ataxia      Psychiatric:         Behavior: Behavior normal.         Thought Content:  Thought content normal.         Judgment: Judgment normal.         Vital Signs  ED Triage Vitals [07/02/23 1756]   Temperature Pulse Respirations Blood Pressure SpO2   98.3 °F (36.8 °C) 72 16 (!) 190/103 94 %      Temp Source Heart Rate Source Patient Position - Orthostatic VS BP Location FiO2 (%)   Temporal Monitor Sitting Left arm --      Pain Score       No Pain           Vitals:    07/02/23 1756 07/02/23 2100 07/02/23 2113   BP: (!) 190/103 (!) 202/112 (!) 190/113   Pulse: 72 76    Patient Position - Orthostatic VS: Sitting           Visual Acuity      ED Medications  Medications   tetanus-diphtheria-acellular pertussis (BOOSTRIX) IM injection 0.5 mL (0.5 mL Intramuscular Given 7/2/23 2112)   hydrALAZINE (APRESOLINE) injection 5 mg (5 mg Intravenous Given 7/2/23 2113)   labetalol (NORMODYNE) injection 5 mg (5 mg Intravenous Given 7/2/23 2206)   fentanyl citrate (PF) 100 MCG/2ML 50 mcg (50 mcg Intravenous Given 7/2/23 2229)   labetalol (NORMODYNE) injection 5 mg (5 mg Intravenous Given 7/2/23 2229)       Diagnostic Studies  Results Reviewed     Procedure Component Value Units Date/Time    Basic metabolic panel [[id number]] Collected: 07/02/23 2119    Lab Status: Final result Specimen: Blood from Arm, Right Updated: 07/02/23 2143     Sodium 139 mmol/L      Potassium 3.6 mmol/L      Chloride 105 mmol/L      CO2 24 mmol/L ANION GAP 10 mmol/L      BUN 22 mg/dL      Creatinine 0.76 mg/dL      Glucose 94 mg/dL      Calcium 9.7 mg/dL      eGFR 88 ml/min/1.73sq m     Narrative:      National Kidney Disease Foundation guidelines for Chronic Kidney Disease (CKD):   •  Stage 1 with normal or high GFR (GFR > 90 mL/min/1.73 square meters)  •  Stage 2 Mild CKD (GFR = 60-89 mL/min/1.73 square meters)  •  Stage 3A Moderate CKD (GFR = 45-59 mL/min/1.73 square meters)  •  Stage 3B Moderate CKD (GFR = 30-44 mL/min/1.73 square meters)  •  Stage 4 Severe CKD (GFR = 15-29 mL/min/1.73 square meters)  •  Stage 5 End Stage CKD (GFR <15 mL/min/1.73 square meters)  Note: GFR calculation is accurate only with a steady state creatinine    Protime-INR [[id number]]  (Normal) Collected: 07/02/23 2119    Lab Status: Final result Specimen: Blood from Arm, Right Updated: 07/02/23 2140     Protime 13.1 seconds      INR 0.99    APTT [[id number]]  (Normal) Collected: 07/02/23 2119    Lab Status: Final result Specimen: Blood from Arm, Right Updated: 07/02/23 2140     PTT 27 seconds     CBC and differential [[id number]] Collected: 07/02/23 2119    Lab Status: Final result Specimen: Blood from Arm, Right Updated: 07/02/23 2127     WBC 6.43 Thousand/uL      RBC 4.92 Million/uL      Hemoglobin 15.2 g/dL      Hematocrit 46.0 %      MCV 94 fL      MCH 30.9 pg      MCHC 33.0 g/dL      RDW 14.3 %      MPV 10.4 fL      Platelets 455 Thousands/uL      nRBC 0 /100 WBCs      Neutrophils Relative 69 %      Immat GRANS % 1 %      Lymphocytes Relative 18 %      Monocytes Relative 8 %      Eosinophils Relative 3 %      Basophils Relative 1 %      Neutrophils Absolute 4.54 Thousands/µL      Immature Grans Absolute 0.03 Thousand/uL      Lymphocytes Absolute 1.16 Thousands/µL      Monocytes Absolute 0.50 Thousand/µL      Eosinophils Absolute 0.17 Thousand/µL      Basophils Absolute 0.03 Thousands/µL                  CT spine cervical without contrast   Final Result by Alvin Christopher MD (07/02 2046)      No cervical spine fracture or traumatic malalignment. Above findings discussed with Dr. Isatu Zuniga at 8:37 p.m. on 7/2/2023. Workstation performed: [id number]         CT head without contrast   Final Result by Moises Weinberg MD (07/02 2039)      Suspect small volume acute subdural hemorrhage layering along the posterior falx. No acute parenchymal hemorrhage or mass effect. No calvarial fracture      Above findings discussed with Dr. Isatu Zuniga at 8:37 p.m. on 7/2/2023. Workstation performed: [id number]                    Procedures  Procedures         ED Course  ED Course as of 07/02/23 2251   Jennifer Polio Jul 02, 2023 2054 CT head without contrast  Discussed CT results with patient, patient's daughter and son. They state that they would prefer to go to [facility] at this time. Transfer order placed                               SBIRT 20yo+    Flowsheet Row Most Recent Value   Initial Alcohol Screen: US AUDIT-C     1. How often do you have a drink containing alcohol? 5 Filed at: 07/02/2023 1759   2. How many drinks containing alcohol do you have on a typical day you are drinking? 1 Filed at: 07/02/2023 1759   3a. Male UNDER 65: How often do you have five or more drinks on one occasion? 0 Filed at: 07/02/2023 1759   3b. FEMALE Any Age, or MALE 65+: How often do you have 4 or more drinks on one occassion? 0 Filed at: 07/02/2023 1759   Audit-C Score 6 Filed at: 07/02/2023 1759   STEVE: How many times in the past year have you. .. Used an illegal drug or used a prescription medication for non-medical reasons? Never Filed at: 07/02/2023 1759                    Medical Decision Making  CT head and C-spine ordered. Head showing small volume subdural.  Discussed this with patient and patient's children and they are stating that they would prefer transfer to [facility] at this time. Patient otherwise neurologically intact.   Does not take blood thinners or aspirin. Basic labs drawn. Amount and/or Complexity of Data Reviewed  Labs: ordered. Radiology: ordered. Decision-making details documented in ED Course. Risk  Prescription drug management. Disposition  Final diagnoses:   Subdural hematoma ([address])     Time reflects when diagnosis was documented in both MDM as applicable and the Disposition within this note     Time User Action Codes Description Comment    7/2/2023  9:25 PM Zuri Nino Add [S06. 5XAA] Subdural hematoma [facility])       ED Disposition     ED Disposition   Transfer to Behavioral Health Condition   --    Date/Time   Sun Jul 2, 2023  9:25 PM    Comment   Percy Meier should be transferred out to [facility] Wu Md, Dr and has been medically cleared.            MD Documentation    Marcio iL Most Recent Value   Patient Condition The patient has been stabilized such that within reasonable medical probability, no material deterioration of the patient condition or the condition of the unborn child(olive) is likely to result from the transfer   Reason for Transfer Level of Care needed not available at this facility, Patient/Family request  [requesting [facility]]   Benefits of Transfer Specialized equipment and/or services available at the receiving facility (Include comment)________________________  Donnal Ruck and neurosurgery]   Risks of Transfer Potential for delay in receiving treatment, Potential deterioration of medical condition, Loss of IV, Increased discomfort during transfer, Possible worsening of condition or death during transfer   Accepting Physician Shahbaz Jones MD, Dr.   Provider Certification General risk, such as traffic hazards, adverse weather conditions, rough terrain or turbulence, possible failure of equipment (including vehicle or aircraft), or consequences of actions of persons outside the control of the transport personnel, Unanticipated needs of medical equipment and personnel during transport, Risk of worsening condition, The possibility of a transport vehicle being unavailable      RN Documentation    [address] Name, [address]   Report Given to Ramon LIN      Follow-up Information     Follow up With Specialties Details Why Contact Info Additional Information    Shahzad Stafford DO Internal Medicine   [address] [facility] [address]  [address] [place] [address] Emergency Department Emergency Medicine Go to  As needed, If symptoms worsen [address] [address]  [address] Emergency Department, [address] [facility], [facility], [address]          Discharge Medication List as of 7/2/2023 10:40 PM      CONTINUE these medications which have NOT CHANGED    Details   allopurinol (ZYLOPRIM) 300 mg tablet Take 1 tablet (300 mg total) by mouth daily, Starting Wed 1/8/2020, Normal      amLODIPine (NORVASC) 10 mg tablet TAKE 1 TABLET BY MOUTH EVERY DAY, Normal      atorvastatin (LIPITOR) 10 mg tablet Take 1 tablet (10 mg total) by mouth daily, Starting Tue 3/17/2020, Normal      Diclofenac Sodium (VOLTAREN) 1 % diclofenac 1 % topical gel   APPLY 2 GRAM TO THE AFFECTED AREA(S) BY TOPICAL ROUTE EVERY 6 HOURS AS NEEDED, Historical Med      Fluticasone Propionate (FLONASE ALLERGY RELIEF NA) into each nostril as needed, Historical Med      gabapentin (NEURONTIN) 300 mg capsule Take 300 mg by mouth daily at bedtime, Historical Med      losartan (COZAAR) 25 mg tablet TAKE 1 TABLET BY MOUTH EVERY DAY, Normal      methocarbamol (ROBAXIN) 500 mg tablet Take 500 mg by mouth Three times daily as needed, Starting Thu 6/8/2023, Until Sun 6/18/2023 at 2359, Historical Med      metroNIDAZOLE (METROGEL) 1 % gel Apply topically daily, Starting Thu 6/4/2020, Normal      omeprazole (PriLOSEC) 20 mg delayed release capsule Take 1 capsule (20 mg total) by mouth daily, Starting Wed 2/26/2020, No Print      oxyCODONE (ROXICODONE) 5 immediate release tablet 1 tablet p.o. every 6 hours as needed severe pain, Normal      phenazopyridine (PYRIDIUM) 200 mg tablet Take 1 tablet (200 mg total) by mouth 3 (three) times a day as needed (Dysuria and bladder discomfort. Take with meals.), Starting Tue 6/13/2023, Normal      tamsulosin (FLOMAX) 0.4 mg TAKE 1 CAPSULE BY MOUTH AT BEDTIME, Normal      timolol (TIMOPTIC) 0.5 % ophthalmic solution Administer 1 drop to both eyes 2 (two) times a day , Starting Fri 6/29/2018, Historical Med             No discharge procedures on file.     PDMP Review     None          ED Provider  Electronically Signed by           Reina Domingo DO  07/02/23 2040

## 2023-07-14 ENCOUNTER — HOSPITAL ENCOUNTER (OUTPATIENT)
Dept: ULTRASOUND IMAGING | Facility: HOSPITAL | Age: 78
Discharge: HOME | End: 2023-07-14
Attending: UROLOGY
Payer: COMMERCIAL

## 2023-07-14 ENCOUNTER — HOSPITAL ENCOUNTER (OUTPATIENT)
Dept: RADIOLOGY | Facility: HOSPITAL | Age: 78
Discharge: HOME | End: 2023-07-14
Payer: COMMERCIAL

## 2023-07-14 DIAGNOSIS — N20.1 URETERAL CALCULUS: ICD-10-CM

## 2023-07-14 PROCEDURE — 76775 US EXAM ABDO BACK WALL LIM: CPT

## 2023-07-14 PROCEDURE — 74018 RADEX ABDOMEN 1 VIEW: CPT

## 2023-07-31 ENCOUNTER — PROCEDURE VISIT (OUTPATIENT)
Dept: UROLOGY | Facility: MEDICAL CENTER | Age: 78
End: 2023-07-31
Payer: COMMERCIAL

## 2023-07-31 VITALS
WEIGHT: 157 LBS | DIASTOLIC BLOOD PRESSURE: 86 MMHG | BODY MASS INDEX: 25.34 KG/M2 | SYSTOLIC BLOOD PRESSURE: 166 MMHG | HEART RATE: 70 BPM

## 2023-07-31 DIAGNOSIS — D41.4 BLADDER POLYP: Primary | ICD-10-CM

## 2023-07-31 PROCEDURE — 52224 CYSTOSCOPY AND TREATMENT: CPT | Performed by: UROLOGY

## 2023-07-31 PROCEDURE — 88305 TISSUE EXAM BY PATHOLOGIST: CPT | Performed by: STUDENT IN AN ORGANIZED HEALTH CARE EDUCATION/TRAINING PROGRAM

## 2023-07-31 RX ORDER — CEPHALEXIN 500 MG/1
500 CAPSULE ORAL EVERY 12 HOURS SCHEDULED
Qty: 4 CAPSULE | Refills: 0 | Status: SHIPPED | OUTPATIENT
Start: 2023-07-31 | End: 2023-08-02

## 2023-07-31 NOTE — PROGRESS NOTES
Cystoscopy     Date/Time 7/31/2023 10:00 AM     Performed by  Pinky Mckay MD   Authorized by Pinky Mckay MD     Universal Protocol:  Consent: Verbal consent obtained. Written consent obtained. Risks and benefits: risks, benefits and alternatives were discussed  Consent given by: patient  Patient understanding: patient states understanding of the procedure being performed  Patient consent: the patient's understanding of the procedure matches consent given  Procedure consent: procedure consent matches procedure scheduled  Required items: required blood products, implants, devices, and special equipment available  Patient identity confirmed: verbally with patient        Procedure Details:  Procedure type: fulguration, lesion removal    Fulguration Locations: posterior bladder   Destruction Method comment:  Electrocautery  Patient tolerance: Patient tolerated the procedure well with no immediate complications    Additional Procedure Details: Cystoscopy Procedure Note        Pre-operative Diagnosis: Bladder lesion    Post-operative Diagnosis: Same, pending pathology      Procedure Details   The risks, benefits, complications, treatment options, and expected outcomes were discussed with the patient. The patient concurred with the proposed plan, giving informed consent. Cystoscopy was performed today under local anesthesia, using sterile technique. The patient was placed in the supine position, prepped and draped in the usual sterile fashion. A 15 Chinese flexible cystoscope  was used to inspect both the urethra and bladder. Findings: Normal urethra with bilobar prostatic hypertrophy causing complete outflow obstruction. Ureteral orifices are largely unremarkable. The patient has recently had stent removal and there is still some inflammation around the orifices. On the posterior wall of the bladder is a 3 to 4 mm solitary papillary lesion that appears very low-grade.   This was easily grasped with a biopsy forceps and removed completely. I then used the Bugbee cautery to fulgurate the base and surrounding mucosa of the lesion. This was very well-tolerated. Hemostasis appeared adequate. Specimens: Bladder lesion sent for pathology                          Discussion: The lesion appears very low-grade and was easily amenable to biopsy today. The biopsy site and surrounding mucosa were fulgurated. Findings were discussed with the patient and we will plan follow-up depending on the pathology report. We will plan 48 hours of Keflex 500 mg twice daily post procedure.

## 2023-08-03 PROCEDURE — 88305 TISSUE EXAM BY PATHOLOGIST: CPT | Performed by: STUDENT IN AN ORGANIZED HEALTH CARE EDUCATION/TRAINING PROGRAM

## 2023-09-26 DIAGNOSIS — N40.0 BENIGN PROSTATIC HYPERPLASIA, UNSPECIFIED WHETHER LOWER URINARY TRACT SYMPTOMS PRESENT: ICD-10-CM

## 2023-09-27 RX ORDER — TAMSULOSIN HYDROCHLORIDE 0.4 MG/1
0.4 CAPSULE ORAL
Qty: 90 CAPSULE | Refills: 3 | Status: SHIPPED | OUTPATIENT
Start: 2023-09-27

## 2024-02-05 ENCOUNTER — PROCEDURE VISIT (OUTPATIENT)
Dept: UROLOGY | Facility: MEDICAL CENTER | Age: 79
End: 2024-02-05
Payer: COMMERCIAL

## 2024-02-05 VITALS
HEIGHT: 66 IN | WEIGHT: 164 LBS | HEART RATE: 77 BPM | OXYGEN SATURATION: 98 % | SYSTOLIC BLOOD PRESSURE: 140 MMHG | BODY MASS INDEX: 26.36 KG/M2 | DIASTOLIC BLOOD PRESSURE: 70 MMHG

## 2024-02-05 DIAGNOSIS — N13.8 BENIGN PROSTATIC HYPERPLASIA WITH URINARY OBSTRUCTION: Primary | ICD-10-CM

## 2024-02-05 DIAGNOSIS — N32.9 LESION OF URINARY BLADDER: ICD-10-CM

## 2024-02-05 DIAGNOSIS — N20.0 NEPHROLITHIASIS: ICD-10-CM

## 2024-02-05 DIAGNOSIS — N40.1 BENIGN PROSTATIC HYPERPLASIA WITH URINARY OBSTRUCTION: Primary | ICD-10-CM

## 2024-02-05 LAB
SL AMB  POCT GLUCOSE, UA: ABNORMAL
SL AMB LEUKOCYTE ESTERASE,UA: ABNORMAL
SL AMB POCT BILIRUBIN,UA: ABNORMAL
SL AMB POCT BLOOD,UA: ABNORMAL
SL AMB POCT CLARITY,UA: CLEAR
SL AMB POCT COLOR,UA: YELLOW
SL AMB POCT KETONES,UA: ABNORMAL
SL AMB POCT NITRITE,UA: ABNORMAL
SL AMB POCT PH,UA: 6.5
SL AMB POCT SPECIFIC GRAVITY,UA: 1.02
SL AMB POCT URINE PROTEIN: ABNORMAL
SL AMB POCT UROBILINOGEN: 0.2

## 2024-02-05 PROCEDURE — 99214 OFFICE O/P EST MOD 30 MIN: CPT | Performed by: UROLOGY

## 2024-02-05 PROCEDURE — 81003 URINALYSIS AUTO W/O SCOPE: CPT | Performed by: UROLOGY

## 2024-02-05 PROCEDURE — 52000 CYSTOURETHROSCOPY: CPT | Performed by: UROLOGY

## 2024-02-05 RX ORDER — BIMATOPROST 0.1 MG/ML
1 SOLUTION/ DROPS OPHTHALMIC
COMMUNITY
Start: 2024-01-11

## 2024-02-05 RX ORDER — LOSARTAN POTASSIUM 100 MG/1
25 TABLET ORAL DAILY
COMMUNITY

## 2024-02-05 RX ORDER — SODIUM, POTASSIUM,MAG SULFATES 17.5-3.13G
1 SOLUTION, RECONSTITUTED, ORAL ORAL ONCE
COMMUNITY

## 2024-02-05 RX ORDER — ALLOPURINOL 100 MG/1
100 TABLET ORAL DAILY
COMMUNITY

## 2024-02-05 RX ORDER — GABAPENTIN 100 MG/1
100 CAPSULE ORAL AS NEEDED
COMMUNITY

## 2024-02-05 RX ORDER — BRIMONIDINE TARTRATE AND TIMOLOL MALEATE 2; 5 MG/ML; MG/ML
1 SOLUTION OPHTHALMIC 2 TIMES DAILY
COMMUNITY
Start: 2024-01-11

## 2024-02-05 NOTE — ASSESSMENT & PLAN NOTE
AUA symptom score is 19 on tamsulosin.  The patient is satisfied with his voiding pattern.  Digital rectal examination is palpably benign.  Options were discussed.  He is content to remain on tamsulosin.  I did give him information regarding photo selective vaporization the prostate.  He will consider whether he wishes to pursue any additional treatment.  He will return in 1 year.

## 2024-02-05 NOTE — PROGRESS NOTES
Assessment/Plan:    Benign prostatic hyperplasia with urinary obstruction  AUA symptom score is 19 on tamsulosin.  The patient is satisfied with his voiding pattern.  Digital rectal examination is palpably benign.  Options were discussed.  He is content to remain on tamsulosin.  I did give him information regarding photo selective vaporization the prostate.  He will consider whether he wishes to pursue any additional treatment.  He will return in 1 year.    Lesion of urinary bladder  This was benign on pathology.  No recurrence is noted.    Nephrolithiasis  Renal ultrasound in July 2023 revealed no evidence of hydronephrosis.  Nonobstructing 3 mm left renal stone was seen.  He is asymptomatic.  We will continue to follow.  Consider repeat upper tract study next year.       Diagnoses and all orders for this visit:    Benign prostatic hyperplasia with urinary obstruction    Lesion of urinary bladder  -     Cystoscopy    Nephrolithiasis  -     POCT urine dip auto non-scope    Other orders  -     allopurinol (ZYLOPRIM) 100 mg tablet; Take 100 mg by mouth daily  -     gabapentin (NEURONTIN) 100 mg capsule; Take 100 mg by mouth as needed  -     losartan (COZAAR) 100 MG tablet; Take 25 mg by mouth daily          Subjective:      Patient ID: Buck Kwan is a 78 y.o. male.    Chief complaint: Lower urinary tract symptoms, bladder lesion.    HPI: 78-year-old male followed for the above complaints.  He reports no change in his voiding pattern.  His stream is slow and he continues to have moderate symptoms of bladder outlet obstruction.  He remains on tamsulosin.  He denies gross hematuria, dysuria or symptoms of infection.  He returns today for follow-up as well as cystoscopy to ensure the bladder lesion noted at his last visit was completely removed and has not recurred.        The following portions of the patient's history were reviewed and updated as appropriate: allergies, current medications, past family history, past  "medical history, past social history, past surgical history, and problem list.    Review of Systems   Constitutional:  Negative for chills, diaphoresis, fatigue and fever.   HENT: Negative.     Eyes: Negative.    Respiratory: Negative.     Cardiovascular: Negative.    Endocrine: Negative.    Genitourinary:         See HPI   Musculoskeletal: Negative.    Skin: Negative.    Allergic/Immunologic: Negative.    Neurological: Negative.    Hematological: Negative.    Psychiatric/Behavioral: Negative.         AUA SYMPTOM SCORE      Flowsheet Row Most Recent Value   AUA SYMPTOM SCORE    How often have you had a sensation of not emptying your bladder completely after you finished urinating? 3 (P)    How often have you had to urinate again less than two hours after you finished urinating? 2 (P)    How often have you found you stopped and started again several times when you urinate? 4 (P)    How often have you found it difficult to postpone urination? 2 (P)    How often have you had a weak urinary stream? 1 (P)    How often have you had to push or strain to begin urination? 2 (P)    How many times did you most typically get up to urinate from the time you went to bed at night until the time you got up in the morning? 5 (P)    Quality of Life: If you were to spend the rest of your life with your urinary condition just the way it is now, how would you feel about that? 2 (P)    AUA SYMPTOM SCORE 19 (P)            Objective:      /70   Pulse 77   Ht 5' 6\" (1.676 m)   Wt 74.4 kg (164 lb)   SpO2 98%   BMI 26.47 kg/m²          Physical Exam  Vitals reviewed.   Constitutional:       General: He is not in acute distress.     Appearance: Normal appearance. He is well-developed and normal weight. He is not ill-appearing, toxic-appearing or diaphoretic.   HENT:      Head: Normocephalic and atraumatic.   Eyes:      General: No scleral icterus.     Conjunctiva/sclera: Conjunctivae normal.   Cardiovascular:      Rate and Rhythm: " Normal rate.   Pulmonary:      Effort: Pulmonary effort is normal.   Abdominal:      General: Bowel sounds are normal. There is no distension.      Palpations: Abdomen is soft. There is no mass.      Tenderness: There is no abdominal tenderness. There is no right CVA tenderness, left CVA tenderness, guarding or rebound.   Genitourinary:     Penis: Normal. No phimosis or hypospadias.       Testes: Normal.         Right: Mass not present.         Left: Mass not present.      Rectum: Normal.      Comments: Moderately enlarged benign feeling prostate.  Musculoskeletal:         General: Normal range of motion.      Cervical back: Neck supple.   Skin:     General: Skin is warm and dry.   Neurological:      General: No focal deficit present.      Mental Status: He is alert and oriented to person, place, and time.   Psychiatric:         Mood and Affect: Mood normal.         Behavior: Behavior normal.         Thought Content: Thought content normal.         Judgment: Judgment normal.            Cystoscopy     Date/Time  2/5/2024 11:00 AM     Performed by  Cy Borden MD   Authorized by  Cy Borden MD     Universal Protocol:  Consent: Verbal consent obtained. Written consent obtained.  Risks and benefits: risks, benefits and alternatives were discussed  Consent given by: patient  Patient understanding: patient states understanding of the procedure being performed  Patient consent: the patient's understanding of the procedure matches consent given  Procedure consent: procedure consent matches procedure scheduled  Patient identity confirmed: verbally with patient      Procedure Details:  Procedure type: cystoscopy    Patient tolerance: Patient tolerated the procedure well with no immediate complications    Additional Procedure Details:      Patient presents for cystoscopy.  I have discussed the reasons for doing the exam, and the potential risks and complications.  Patient expressed understanding, and signed informed  consent document.    The patient was carefully  positioned supine on the examining table.  Sterile preparation was performed on the urethra.  Xylocaine jelly was instilled and left  Indwelling for the procedure.  The 15 Wolof flexible cystoscope was passed with the following findings:      Urethra: Normal urethra    Prostate:  lateral lobes-bilobar obstruction with elevation of median bar.                  Bladder: Mild to moderate trabeculation, no lesions, tumor, or stones.  No recurrence of lesion noted.   Residual urine: Small    Patient tolerated the procedure well and was escorted from the examining table.

## 2024-02-05 NOTE — ASSESSMENT & PLAN NOTE
Renal ultrasound in July 2023 revealed no evidence of hydronephrosis.  Nonobstructing 3 mm left renal stone was seen.  He is asymptomatic.  We will continue to follow.  Consider repeat upper tract study next year.

## 2024-02-05 NOTE — LETTER
February 5, 2024     Martin Crane DO  [address]  [address]  [place] [address]    Patient: Buck Kwan   YOB: 1945   Date of Visit: 2/5/2024       Dear Dr. Crane:    Thank you for referring Buck Kwan to me for evaluation. Below are my notes for this consultation.    If you have questions, please do not hesitate to call me. I look forward to following your patient along with you.         Sincerely,        Cy Borden MD        CC: No Recipients    Cy Borden MD  2/5/2024 12:39 PM  Sign when Signing Visit  Assessment/Plan:    Benign prostatic hyperplasia with urinary obstruction  AUA symptom score is 19 on tamsulosin.  The patient is satisfied with his voiding pattern.  Digital rectal examination is palpably benign.  Options were discussed.  He is content to remain on tamsulosin.  I did give him information regarding photo selective vaporization the prostate.  He will consider whether he wishes to pursue any additional treatment.  He will return in 1 year.    Lesion of urinary bladder  This was benign on pathology.  No recurrence is noted.    Nephrolithiasis  Renal ultrasound in July 2023 revealed no evidence of hydronephrosis.  Nonobstructing 3 mm left renal stone was seen.  He is asymptomatic.  We will continue to follow.  Consider repeat upper tract study next year.       Diagnoses and all orders for this visit:    Benign prostatic hyperplasia with urinary obstruction    Lesion of urinary bladder  -     Cystoscopy    Nephrolithiasis  -     POCT urine dip auto non-scope    Other orders  -     allopurinol (ZYLOPRIM) 100 mg tablet; Take 100 mg by mouth daily  -     gabapentin (NEURONTIN) 100 mg capsule; Take 100 mg by mouth as needed  -     losartan (COZAAR) 100 MG tablet; Take 25 mg by mouth daily          Subjective:      Patient ID: Buck Kwan is a 78 y.o. male.    Chief complaint: Lower urinary tract symptoms, bladder lesion.    HPI: 78-year-old male followed for the  above complaints.  He reports no change in his voiding pattern.  His stream is slow and he continues to have moderate symptoms of bladder outlet obstruction.  He remains on tamsulosin.  He denies gross hematuria, dysuria or symptoms of infection.  He returns today for follow-up as well as cystoscopy to ensure the bladder lesion noted at his last visit was completely removed and has not recurred.        The following portions of the patient's history were reviewed and updated as appropriate: allergies, current medications, past family history, past medical history, past social history, past surgical history, and problem list.    Review of Systems   Constitutional:  Negative for chills, diaphoresis, fatigue and fever.   HENT: Negative.     Eyes: Negative.    Respiratory: Negative.     Cardiovascular: Negative.    Endocrine: Negative.    Genitourinary:         See HPI   Musculoskeletal: Negative.    Skin: Negative.    Allergic/Immunologic: Negative.    Neurological: Negative.    Hematological: Negative.    Psychiatric/Behavioral: Negative.         AUA SYMPTOM SCORE      Flowsheet Row Most Recent Value   AUA SYMPTOM SCORE    How often have you had a sensation of not emptying your bladder completely after you finished urinating? 3 (P)    How often have you had to urinate again less than two hours after you finished urinating? 2 (P)    How often have you found you stopped and started again several times when you urinate? 4 (P)    How often have you found it difficult to postpone urination? 2 (P)    How often have you had a weak urinary stream? 1 (P)    How often have you had to push or strain to begin urination? 2 (P)    How many times did you most typically get up to urinate from the time you went to bed at night until the time you got up in the morning? 5 (P)    Quality of Life: If you were to spend the rest of your life with your urinary condition just the way it is now, how would you feel about that? 2 (P)    AUA  "SYMPTOM SCORE 19 (P)            Objective:      /70   Pulse 77   Ht 5' 6\" (1.676 m)   Wt 74.4 kg (164 lb)   SpO2 98%   BMI 26.47 kg/m²          Physical Exam  Vitals reviewed.   Constitutional:       General: He is not in acute distress.     Appearance: Normal appearance. He is well-developed and normal weight. He is not ill-appearing, toxic-appearing or diaphoretic.   HENT:      Head: Normocephalic and atraumatic.   Eyes:      General: No scleral icterus.     Conjunctiva/sclera: Conjunctivae normal.   Cardiovascular:      Rate and Rhythm: Normal rate.   Pulmonary:      Effort: Pulmonary effort is normal.   Abdominal:      General: Bowel sounds are normal. There is no distension.      Palpations: Abdomen is soft. There is no mass.      Tenderness: There is no abdominal tenderness. There is no right CVA tenderness, left CVA tenderness, guarding or rebound.   Genitourinary:     Penis: Normal. No phimosis or hypospadias.       Testes: Normal.         Right: Mass not present.         Left: Mass not present.      Rectum: Normal.      Comments: Moderately enlarged benign feeling prostate.  Musculoskeletal:         General: Normal range of motion.      Cervical back: Neck supple.   Skin:     General: Skin is warm and dry.   Neurological:      General: No focal deficit present.      Mental Status: He is alert and oriented to person, place, and time.   Psychiatric:         Mood and Affect: Mood normal.         Behavior: Behavior normal.         Thought Content: Thought content normal.         Judgment: Judgment normal.            Cystoscopy     Date/Time  2/5/2024 11:00 AM     Performed by  Cy Borden MD   Authorized by  Cy Borden MD     Universal Protocol:  Consent: Verbal consent obtained. Written consent obtained.  Risks and benefits: risks, benefits and alternatives were discussed  Consent given by: patient  Patient understanding: patient states understanding of the procedure being " performed  Patient consent: the patient's understanding of the procedure matches consent given  Procedure consent: procedure consent matches procedure scheduled  Patient identity confirmed: verbally with patient      Procedure Details:  Procedure type: cystoscopy    Patient tolerance: Patient tolerated the procedure well with no immediate complications    Additional Procedure Details:      Patient presents for cystoscopy.  I have discussed the reasons for doing the exam, and the potential risks and complications.  Patient expressed understanding, and signed informed consent document.    The patient was carefully  positioned supine on the examining table.  Sterile preparation was performed on the urethra.  Xylocaine jelly was instilled and left  Indwelling for the procedure.  The 15 Iranian flexible cystoscope was passed with the following findings:      Urethra: Normal urethra    Prostate:  lateral lobes-bilobar obstruction with elevation of median bar.                  Bladder: Mild to moderate trabeculation, no lesions, tumor, or stones.  No recurrence of lesion noted.   Residual urine: Small    Patient tolerated the procedure well and was escorted from the examining table.

## 2024-02-07 ENCOUNTER — NURSE TRIAGE (OUTPATIENT)
Age: 79
End: 2024-02-07

## 2024-02-07 ENCOUNTER — APPOINTMENT (OUTPATIENT)
Dept: LAB | Facility: HOSPITAL | Age: 79
End: 2024-02-07
Payer: COMMERCIAL

## 2024-02-07 DIAGNOSIS — N13.8 BENIGN PROSTATIC HYPERPLASIA WITH URINARY OBSTRUCTION: ICD-10-CM

## 2024-02-07 DIAGNOSIS — N32.9 LESION OF URINARY BLADDER: Primary | ICD-10-CM

## 2024-02-07 DIAGNOSIS — N40.1 BENIGN PROSTATIC HYPERPLASIA WITH URINARY OBSTRUCTION: ICD-10-CM

## 2024-02-07 DIAGNOSIS — N39.0 URINARY TRACT INFECTION WITHOUT HEMATURIA, SITE UNSPECIFIED: Primary | ICD-10-CM

## 2024-02-07 PROCEDURE — 87086 URINE CULTURE/COLONY COUNT: CPT

## 2024-02-07 RX ORDER — AMOXICILLIN AND CLAVULANATE POTASSIUM 875; 125 MG/1; MG/1
1 TABLET, FILM COATED ORAL EVERY 12 HOURS SCHEDULED
Qty: 14 TABLET | Refills: 0 | Status: SHIPPED | OUTPATIENT
Start: 2024-02-07 | End: 2024-02-14

## 2024-02-07 NOTE — TELEPHONE ENCOUNTER
"Call placed to patient and spoke with him. Informed him of the AP recommendations. Pt states that he will proceed to [facility] labs today to submit sample as this is right by his house.   Pt also stated that he did start tasking Augmentin as he had this at home and his symptoms were \"very bothersome and painful\".   "

## 2024-02-07 NOTE — TELEPHONE ENCOUNTER
Recommend patient going for urine testing. Ill send over antibiotics for patient to start after giving sample. May need to be adjusted based on results. ER precautions, increase hydration. Thanks

## 2024-02-07 NOTE — TELEPHONE ENCOUNTER
"Pt had cysto on 02/5/24 with Dr. Hernandez in [place].    Pt called reports has temp of 100.8 this morning and went down to 99. Also reports burning with urination and blood in urine. Reports urine also smells funny. Painful and hard to go. Also feel all over achy. mildThinks maybe has infection. Please advise    [facility]  Reason for Disposition   All other urine symptoms    Answer Assessment - Initial Assessment Questions  1. SYMPTOM: \"What's the main symptom you're concerned about?\" (e.g., frequency, incontinence)      burning  2. ONSET: \"When did the  symptoms  start?\"      This morning   3. PAIN: \"Is there any pain?\" If Yes, ask: \"How bad is it?\" (Scale: 1-10; mild, moderate, severe)     mild  4. CAUSE: \"What do you think is causing the symptoms?\"      infection  5. OTHER SYMPTOMS: \"Do you have any other symptoms?\" (e.g., fever, flank pain, blood in urine, pain with urination)      Burning, blood and strong smelling urine    Protocols used: Urinary Symptoms-ADULT-[place]    "

## 2024-02-08 LAB — BACTERIA UR CULT: NORMAL

## 2024-02-09 ENCOUNTER — TELEPHONE (OUTPATIENT)
Dept: UROLOGY | Facility: MEDICAL CENTER | Age: 79
End: 2024-02-09

## 2024-02-09 NOTE — TELEPHONE ENCOUNTER
I recommend continuing Augmentin due to reports of fevers and dysuria as well as blood in the urine following cystoscopy.  According to nursing note patient had started Augmentin prior to providing urine sample and this may be why the urine culture is negative.  Continue Augmentin as prescribed.  Call with any worsening symptoms.

## 2024-02-09 NOTE — TELEPHONE ENCOUNTER
Call placed to patient and spoke with him. Informed him of the AP recommendations at this time. Pt understands and is aware and will take medication as prescribed until completed. He will contact the office if symptoms worsen after antibiotic course.

## 2024-02-16 ENCOUNTER — TELEPHONE (OUTPATIENT)
Dept: UROLOGY | Facility: MEDICAL CENTER | Age: 79
End: 2024-02-16

## 2024-02-16 DIAGNOSIS — N39.0 URINARY TRACT INFECTION WITHOUT HEMATURIA, SITE UNSPECIFIED: Primary | ICD-10-CM

## 2024-02-16 NOTE — TELEPHONE ENCOUNTER
Urine testing was negative for infection. If continuing with symptoms, can repeat urine testing. Increase hydration, avoidance of bladder irritants. Thanks

## 2024-02-16 NOTE — TELEPHONE ENCOUNTER
Pt called back requesting urine orders to be faxed to [facility] lab at [phone]. Faxed orders. No further assistance needed.

## 2024-02-16 NOTE — TELEPHONE ENCOUNTER
Spoke with pt and informed him new scripts for urine testing (urine micro and urine culture) were placed in the system. He stated [facility]. He will call us back if he does and needs us to fax the scripts over to them.

## 2024-02-16 NOTE — TELEPHONE ENCOUNTER
Pt called reports finished medication yesterday and symptoms have returned. Reports having discomfort with urination and fishy smelling urine. Patient asking if he should start medication again. Please advise

## 2024-02-17 ENCOUNTER — TELEPHONE (OUTPATIENT)
Dept: UROLOGY | Facility: CLINIC | Age: 79
End: 2024-02-17

## 2024-02-17 ENCOUNTER — NURSE TRIAGE (OUTPATIENT)
Dept: OTHER | Facility: OTHER | Age: 79
End: 2024-02-17

## 2024-02-17 DIAGNOSIS — N39.0 URINARY TRACT INFECTION WITHOUT HEMATURIA, SITE UNSPECIFIED: Primary | ICD-10-CM

## 2024-02-17 RX ORDER — AMOXICILLIN AND CLAVULANATE POTASSIUM 875; 125 MG/1; MG/1
1 TABLET, FILM COATED ORAL EVERY 12 HOURS SCHEDULED
Qty: 14 TABLET | Refills: 0 | Status: SHIPPED | OUTPATIENT
Start: 2024-02-17 | End: 2024-02-24

## 2024-02-17 NOTE — TELEPHONE ENCOUNTER
Regarding: UTI with burning and foul odor  ----- Message from Katharine Lara sent at 2/17/2024 10:33 AM EST -----  I have tested positive for a UTI yesterday and I need medication sent to my pharmacy [facility] in [place]. My symptoms are burning sensation when I urinate, foul odor, and discomfort in my bladder area with some mucus.

## 2024-02-17 NOTE — TELEPHONE ENCOUNTER
I spoke to the patient over the phone and sent antibiotics to his pharmacy.  He had a negative culture about 10 days ago.  He reports having taken his urine from yesterday to  and and believes he has an infection.  I explained that those culture results will not be available until Monday.  I sent Augmentin to his pharmacy as requested.

## 2024-02-17 NOTE — TELEPHONE ENCOUNTER
"Answer Assessment - Initial Assessment Questions  1. SYMPTOM: \"What's the main symptom you're concerned about?\" (e.g., frequency, incontinence)      Burning with urination, foul odor, and discomfort/pressure in bladder area with some mucus    2. ONSET: \"When did the symptoms start?\"      Thursday night    3. PAIN: \"Is there any pain?\" If Yes, ask: \"How bad is it?\" (Scale: 1-10; mild, moderate, severe)      Right now, 3/10    4. CAUSE: \"What do you think is causing the symptoms?\"      Unsure    5. OTHER SYMPTOMS: \"Do you have any other symptoms?\" (e.g., fever, flank pain, blood in urine, pain with urination)      See above      98.5 (96.7)  -amoxicillin clav (Augmentin) -> cipro?    Protocols used: Urinary Symptoms-ADULT-[facility]      Per the on call provider, called patient back directly and let patient know he sent antibiotics to his pharmacy.   "

## 2024-02-19 NOTE — TELEPHONE ENCOUNTER
Spoke with pt and relayed to him the message below from the AP. Patient states he is feeling fine at this point except for the bout of diarrhea he had yesterday which he feels is related to the antibiotic. Pt will stop taking Augmentin now and will keep us informed if his UTI symptoms reoccur.     KAR Connors, RN1 hour ago (9:59 AM)       Based on the cultures I can see in care everywhere and within his chart it looks like his urine culture came back for mixed contaminants and there is actually no sign of acute infection.  If patient is still having symptoms he can continue taking the Augmentin until completion.  If he does not have any symptoms I would stop taking the Augmentin because there is a low likelihood that he does have an infection if he is asymptomatic.  Thank you.

## 2024-02-19 NOTE — TELEPHONE ENCOUNTER
Wilmar Hebert PA-C       I spoke to the patient over the phone and sent antibiotics to his pharmacy.  He had a negative culture about 10 days ago.  He reports having taken his urine from yesterday to  and and believes he has an infection.  I explained that those culture results will not be available until Monday.  I sent Augmentin to his pharmacy as requested.

## 2024-03-07 ENCOUNTER — APPOINTMENT (OUTPATIENT)
Dept: LAB | Facility: HOSPITAL | Age: 79
End: 2024-03-07
Payer: COMMERCIAL

## 2024-03-07 ENCOUNTER — NURSE TRIAGE (OUTPATIENT)
Age: 79
End: 2024-03-07

## 2024-03-07 DIAGNOSIS — N20.0 NEPHROLITHIASIS: ICD-10-CM

## 2024-03-07 DIAGNOSIS — R39.9 UTI SYMPTOMS: ICD-10-CM

## 2024-03-07 DIAGNOSIS — R39.9 UTI SYMPTOMS: Primary | ICD-10-CM

## 2024-03-07 LAB
BACTERIA UR QL AUTO: ABNORMAL /HPF
BILIRUB UR QL STRIP: NEGATIVE
CLARITY UR: CLEAR
COLOR UR: ABNORMAL
GLUCOSE UR STRIP-MCNC: NEGATIVE MG/DL
HGB UR QL STRIP.AUTO: NEGATIVE
KETONES UR STRIP-MCNC: NEGATIVE MG/DL
LEUKOCYTE ESTERASE UR QL STRIP: ABNORMAL
NITRITE UR QL STRIP: NEGATIVE
NON-SQ EPI CELLS URNS QL MICRO: ABNORMAL /HPF
PH UR STRIP.AUTO: 7 [PH]
PROT UR STRIP-MCNC: ABNORMAL MG/DL
RBC #/AREA URNS AUTO: ABNORMAL /HPF
SP GR UR STRIP.AUTO: 1.01 (ref 1–1.03)
UROBILINOGEN UR STRIP-ACNC: <2 MG/DL
WBC #/AREA URNS AUTO: ABNORMAL /HPF

## 2024-03-07 PROCEDURE — 81001 URINALYSIS AUTO W/SCOPE: CPT

## 2024-03-07 PROCEDURE — 87086 URINE CULTURE/COLONY COUNT: CPT

## 2024-03-07 NOTE — TELEPHONE ENCOUNTER
Agree with urine testing and conservative measures. Pain in bladder and need to rule out infection.

## 2024-03-07 NOTE — TELEPHONE ENCOUNTER
Patient called back, says that he was disconnected from call with Nurse.    Please review and return the patient's call.    Call back [phone]

## 2024-03-07 NOTE — TELEPHONE ENCOUNTER
Will check urine first to rule out infection. Then may need imaging and cystoscopy if negative. Thanks

## 2024-03-07 NOTE — TELEPHONE ENCOUNTER
Called patient back and advised ED Precautions and that urine testing orders had been placed. Patient acknowledged understanding.

## 2024-03-07 NOTE — TELEPHONE ENCOUNTER
"Spoke with pt and he is on his way at this moment to complete the urine testing. I advised him I would monitor for the results.    Patient is concerned about the \"small particles\" he was urinating and wants to know if any other testing will be ordered. I told him I would follow up with AP and advise him later.   "

## 2024-03-07 NOTE — TELEPHONE ENCOUNTER
"Patient called regarding symptoms he is having. Patient has history of stones, patient has history of UTI, reporting urgency, trouble starting stream. Reports frequent discomfort, sometimes turning to sharp pains, pain at \"base of bladder\". Reports this morning he urinated small scab like looking particles. Order urine testing to rule out UTI. Call disconnected prior to instructions.         Answer Questions - Initial Assessment   When did this pain start: yesterday    Where is your pain: bottom of bladder, burning sensation in penis w/ urination    Is your pain on the left, right, or both sides: both    Does your pain radiate anywhere: pain does not radiate    Can you qualify the pain: intermittent sometimes sharp w/burning, usually dull    Do you have nausea or vomiting: no    Do you have a fever 101 or higher: No, but \"elevated temp compared to his normal\"    Do you have any urinary symptoms: yes, urinated small particles that look like \"scabs 1/4 inch\", when at it's worst hard to start stream    Do you have a history of kidney stones or UTI's: yes    Have you had recent urologic procedure or surgery: yes, cystoscopy 2/5/24    Have you been to ER, urgent care, PCP or another specialist for this: Specialist    Have you had recent imaging: pelvic ultrasound July 2023    Have you had any urine testing within the last week: no, last culture 2/7/24 negative, did a course of ABX    Protocols used: FLANK PAIN / KIDNEY STONES / HYDRONEPHROSIS    "

## 2024-03-08 LAB — BACTERIA UR CULT: NORMAL

## 2024-03-08 NOTE — TELEPHONE ENCOUNTER
CT stone study will be ordered to rule out kidney stone progression. Would recommend patient is set up for cystoscopy with MD since this is an ongoing issue that continues to occur and no active infection has been found.

## 2024-03-08 NOTE — TELEPHONE ENCOUNTER
Urine culture finalized. Pt will want to know what other testing to have done. He already expected this would come back no growth. Please advise.

## 2024-03-11 NOTE — TELEPHONE ENCOUNTER
Spoke with pt. He was scheduled for PVR appt with nurses on 3/20/24. Pt is also having the ct scan done the same day.

## 2024-03-19 RX ORDER — PREDNISOLONE ACETATE 10 MG/ML
SUSPENSION/ DROPS OPHTHALMIC
COMMUNITY
Start: 2024-02-19

## 2024-03-20 ENCOUNTER — PROCEDURE VISIT (OUTPATIENT)
Dept: UROLOGY | Facility: MEDICAL CENTER | Age: 79
End: 2024-03-20
Payer: COMMERCIAL

## 2024-03-20 ENCOUNTER — TELEPHONE (OUTPATIENT)
Dept: UROLOGY | Facility: MEDICAL CENTER | Age: 79
End: 2024-03-20

## 2024-03-20 ENCOUNTER — HOSPITAL ENCOUNTER (OUTPATIENT)
Dept: CT IMAGING | Facility: HOSPITAL | Age: 79
Discharge: HOME | End: 2024-03-20
Payer: COMMERCIAL

## 2024-03-20 VITALS
DIASTOLIC BLOOD PRESSURE: 100 MMHG | SYSTOLIC BLOOD PRESSURE: 220 MMHG | WEIGHT: 160 LBS | BODY MASS INDEX: 25.71 KG/M2 | HEIGHT: 66 IN

## 2024-03-20 DIAGNOSIS — N13.8 BPH WITH URINARY OBSTRUCTION: Primary | ICD-10-CM

## 2024-03-20 DIAGNOSIS — N40.1 BPH WITH URINARY OBSTRUCTION: Primary | ICD-10-CM

## 2024-03-20 DIAGNOSIS — N20.0 NEPHROLITHIASIS: ICD-10-CM

## 2024-03-20 DIAGNOSIS — R39.9 UTI SYMPTOMS: ICD-10-CM

## 2024-03-20 LAB — POST-VOID RESIDUAL VOLUME, ML POC: 35 ML

## 2024-03-20 PROCEDURE — 51798 US URINE CAPACITY MEASURE: CPT

## 2024-03-20 PROCEDURE — 74176 CT ABD & PELVIS W/O CONTRAST: CPT

## 2024-03-20 NOTE — PROGRESS NOTES
"3/20/2024  Buck Kwan is a 78 y.o. male  [id number]    Diagnosis:  Chief Complaint    BPH with urinary obstruction         Patient presents for post void residual  managed by Dr. Borden    Plan:    Pt is scheduled later today for CT scan.  He will be contacted with results and follow up.      Assessment:    Dr. Borden ordered PVR for pt prior to scheduling another cysto for symptoms patient is experiencing such as urinary urgency, trouble starting stream, intermittent discomfort at \"base of bladder\".  He also reported small \"scab like\" looking particles in urine.  UC was negative for infection.    Vitals:    03/20/24 0936   BP: (!) 220/100   Weight: 72.6 kg (160 lb)   Height: 5' 6\" (1.676 m)     Patient voided in the office.  Post void residual measured to be 35 mls.    Recent Results (from the past 6 hour(s))   POCT Measure PVR    Collection Time: 03/20/24 10:11 AM   Result Value Ref Range    POST-VOID RESIDUAL VOLUME, ML POC 35 mL         Gardenia Stewart RN     "

## 2024-04-02 DIAGNOSIS — N41.1 PROSTATITIS, CHRONIC: Primary | Chronic | ICD-10-CM

## 2024-04-02 RX ORDER — AMOXICILLIN AND CLAVULANATE POTASSIUM 875; 125 MG/1; MG/1
1 TABLET, FILM COATED ORAL EVERY 12 HOURS SCHEDULED
Qty: 28 TABLET | Refills: 1 | Status: SHIPPED | OUTPATIENT
Start: 2024-04-02 | End: 2024-04-16

## 2024-05-10 ENCOUNTER — OFFICE VISIT (OUTPATIENT)
Dept: UROLOGY | Facility: MEDICAL CENTER | Age: 79
End: 2024-05-10
Payer: COMMERCIAL

## 2024-05-10 VITALS
BODY MASS INDEX: 26.1 KG/M2 | HEART RATE: 91 BPM | WEIGHT: 162.4 LBS | OXYGEN SATURATION: 95 % | DIASTOLIC BLOOD PRESSURE: 80 MMHG | HEIGHT: 66 IN | SYSTOLIC BLOOD PRESSURE: 158 MMHG

## 2024-05-10 DIAGNOSIS — N40.1 BENIGN PROSTATIC HYPERPLASIA WITH URINARY OBSTRUCTION: ICD-10-CM

## 2024-05-10 DIAGNOSIS — R30.0 DYSURIA: Primary | ICD-10-CM

## 2024-05-10 DIAGNOSIS — N13.8 BENIGN PROSTATIC HYPERPLASIA WITH URINARY OBSTRUCTION: ICD-10-CM

## 2024-05-10 LAB
SL AMB  POCT GLUCOSE, UA: ABNORMAL
SL AMB LEUKOCYTE ESTERASE,UA: ABNORMAL
SL AMB POCT BILIRUBIN,UA: ABNORMAL
SL AMB POCT BLOOD,UA: ABNORMAL
SL AMB POCT CLARITY,UA: CLEAR
SL AMB POCT COLOR,UA: YELLOW
SL AMB POCT KETONES,UA: ABNORMAL
SL AMB POCT NITRITE,UA: ABNORMAL
SL AMB POCT PH,UA: 5.5
SL AMB POCT SPECIFIC GRAVITY,UA: 1.02
SL AMB POCT URINE PROTEIN: ABNORMAL
SL AMB POCT UROBILINOGEN: 0.2

## 2024-05-10 PROCEDURE — 81003 URINALYSIS AUTO W/O SCOPE: CPT | Performed by: UROLOGY

## 2024-05-10 PROCEDURE — 99214 OFFICE O/P EST MOD 30 MIN: CPT | Performed by: UROLOGY

## 2024-05-10 RX ORDER — TAMSULOSIN HYDROCHLORIDE 0.4 MG/1
0.4 CAPSULE ORAL
Qty: 90 CAPSULE | Refills: 3 | Status: SHIPPED | OUTPATIENT
Start: 2024-05-10

## 2024-05-10 NOTE — PROGRESS NOTES
Assessment/Plan:    Dysuria  Urinalysis is now unremarkable and dysuria has resolved.  We will continue to follow.    Benign prostatic hyperplasia with urinary obstruction  AUA symptom score is 19 on tamsulosin.  He is pleased with his voiding pattern at this time.  He has not had a recent PSA test.  We discussed the pros and cons of this point we will defer further PSA testing.  Digital rectal examination was palpably benign at his last visit.  His post cystoscopy voiding symptoms have resolved and we will continue to follow.  He will return in 6 months.       Diagnoses and all orders for this visit:    Dysuria  -     POCT urine dip auto non-scope    Benign prostatic hyperplasia with urinary obstruction  -     tamsulosin (FLOMAX) 0.4 mg; Take 1 capsule (0.4 mg total) by mouth daily at bedtime          Subjective:      Patient ID: Buck Kwan is a 78 y.o. male.    Chief complaint: Lower urinary tract symptoms    HPI: 78-year-old male followed for BPH, kidney stones and a small bladder lesion which was benign on biopsy.  He had a cystoscopy in February and postoperatively noted dysuria.  The dysuria was difficult to resolve.  Urine cultures were negative but he eventually improved with a  course of Augmentin.  He now notes his voiding pattern is back to normal.  There is no fever, gross hematuria or symptoms of infection at this time.  He has no flank pain.  He feels he empties his bladder adequately.  He remains on tamsulosin.        The following portions of the patient's history were reviewed and updated as appropriate: allergies, current medications, past family history, past medical history, past social history, past surgical history, and problem list.    Review of Systems   Constitutional:  Negative for chills, diaphoresis, fatigue and fever.   HENT: Negative.     Eyes: Negative.    Respiratory: Negative.     Cardiovascular: Negative.    Endocrine: Negative.    Genitourinary:         See HPI  "  Musculoskeletal:  Positive for arthralgias and back pain.   Skin: Negative.    Allergic/Immunologic: Negative.    Neurological: Negative.         Neuropathy   Hematological: Negative.    Psychiatric/Behavioral: Negative.         AUA SYMPTOM SCORE      Flowsheet Row Most Recent Value   AUA SYMPTOM SCORE    How often have you had a sensation of not emptying your bladder completely after you finished urinating? 2 (P)    How often have you had to urinate again less than two hours after you finished urinating? 3 (P)    How often have you found you stopped and started again several times when you urinate? 3 (P)    How often have you found it difficult to postpone urination? 3 (P)    How often have you had a weak urinary stream? 3 (P)    How often have you had to push or strain to begin urination? 3 (P)    How many times did you most typically get up to urinate from the time you went to bed at night until the time you got up in the morning? 2 (P)    Quality of Life: If you were to spend the rest of your life with your urinary condition just the way it is now, how would you feel about that? 1 (P)    AUA SYMPTOM SCORE 19 (P)            Objective:      /80 (BP Location: Left arm, Patient Position: Sitting, Cuff Size: Adult)   Pulse 91   Ht 5' 6\" (1.676 m)   Wt 73.7 kg (162 lb 6.4 oz)   SpO2 95%   BMI 26.21 kg/m²          Physical Exam  Constitutional:       General: He is not in acute distress.     Appearance: Normal appearance. He is well-developed and normal weight. He is not ill-appearing, toxic-appearing or diaphoretic.   HENT:      Head: Normocephalic and atraumatic.   Eyes:      General: No scleral icterus.     Conjunctiva/sclera: Conjunctivae normal.   Cardiovascular:      Rate and Rhythm: Normal rate.   Pulmonary:      Effort: Pulmonary effort is normal.   Abdominal:      General: Abdomen is flat. There is no distension.      Palpations: There is no mass.      Tenderness: There is no abdominal tenderness. " There is no right CVA tenderness, left CVA tenderness, guarding or rebound.      Hernia: No hernia is present.   Genitourinary:     Penis: Normal.       Testes: Normal.      Comments: Left spermatocele  Musculoskeletal:      Cervical back: Neck supple.   Skin:     General: Skin is warm and dry.   Neurological:      General: No focal deficit present.      Mental Status: He is alert and oriented to person, place, and time.   Psychiatric:         Mood and Affect: Mood normal.         Behavior: Behavior normal.         Thought Content: Thought content normal.         Judgment: Judgment normal.

## 2024-05-10 NOTE — ASSESSMENT & PLAN NOTE
AUA symptom score is 19 on tamsulosin.  He is pleased with his voiding pattern at this time.  He has not had a recent PSA test.  We discussed the pros and cons of this point we will defer further PSA testing.  Digital rectal examination was palpably benign at his last visit.  His post cystoscopy voiding symptoms have resolved and we will continue to follow.  He will return in 6 months.

## 2024-12-12 ENCOUNTER — OFFICE VISIT (OUTPATIENT)
Dept: UROLOGY | Facility: MEDICAL CENTER | Age: 79
End: 2024-12-12

## 2024-12-12 ENCOUNTER — TELEPHONE (OUTPATIENT)
Dept: UROLOGY | Facility: MEDICAL CENTER | Age: 79
End: 2024-12-12

## 2024-12-12 VITALS
SYSTOLIC BLOOD PRESSURE: 152 MMHG | WEIGHT: 163 LBS | HEIGHT: 66 IN | DIASTOLIC BLOOD PRESSURE: 86 MMHG | HEART RATE: 88 BPM | BODY MASS INDEX: 26.2 KG/M2 | OXYGEN SATURATION: 98 %

## 2024-12-12 DIAGNOSIS — N20.0 KIDNEY STONE: ICD-10-CM

## 2024-12-12 DIAGNOSIS — N40.1 BENIGN PROSTATIC HYPERPLASIA WITH URINARY OBSTRUCTION: Primary | ICD-10-CM

## 2024-12-12 DIAGNOSIS — N13.8 BENIGN PROSTATIC HYPERPLASIA WITH URINARY OBSTRUCTION: Primary | ICD-10-CM

## 2024-12-12 DIAGNOSIS — Q07.9: ICD-10-CM

## 2024-12-12 LAB
SL AMB  POCT GLUCOSE, UA: ABNORMAL
SL AMB LEUKOCYTE ESTERASE,UA: ABNORMAL
SL AMB POCT BILIRUBIN,UA: ABNORMAL
SL AMB POCT BLOOD,UA: ABNORMAL
SL AMB POCT CLARITY,UA: CLEAR
SL AMB POCT COLOR,UA: YELLOW
SL AMB POCT KETONES,UA: ABNORMAL
SL AMB POCT NITRITE,UA: ABNORMAL
SL AMB POCT PH,UA: 6
SL AMB POCT SPECIFIC GRAVITY,UA: 1.02
SL AMB POCT URINE PROTEIN: ABNORMAL
SL AMB POCT UROBILINOGEN: 0.2

## 2024-12-12 RX ORDER — TAMSULOSIN HYDROCHLORIDE 0.4 MG/1
0.8 CAPSULE ORAL
Qty: 180 CAPSULE | Refills: 3 | Status: SHIPPED | OUTPATIENT
Start: 2024-12-12

## 2024-12-12 NOTE — TELEPHONE ENCOUNTER
Patient seen by Kingston Reddy this morning.    Patient said he will call to schedule his one year follow up when it is closer.    Patient going for KUB and will get U/S after if needed.

## 2024-12-12 NOTE — PROGRESS NOTES
12/12/2024      Assessment and Plan    79 y.o. male managed by Dr. Borden    Benign prostatic hyperplasia with urinary obstruction  AUA symptom score 20  Patient currently controlled on Flomax 0.4 mg  We discussed further pharmacotherapy with trialing an increase of the Flomax to 0.8 mg daily versus combination treatment with Flomax 0.4 mg and finasteride 5 mg daily.  Patient will trial Flomax 0.8 mg daily and return to the office to reevaluate lower urinary tract symptoms and should undergo PVR at that time    Kidney stone  Patient underwent CT renal stone study on 3/20/2024 which noted a 2 mm left nonobstructing interpolar renal calculi  We discussed obtaining an ultrasound of the kidney and bladder as well as a KUB to further visualize remaining stone burden.  Patient will undergo ultrasound of the kidney and bladder and a KUB in our office will call the results        History of Present Illness  Buck Kwan is a 79 y.o. male here for evaluation of BPH with obstruction/lower urinary tract symptoms.  Patient was last seen in the office on 5/10/2024.  Patient also has a prior history of kidney stones and small bladder lesion which was benign on biopsy.  Patient is currently controlled on Flomax 0.4 mg daily for treatment of his lower urinary tract symptoms.  Today, patient reported worsening of his lower urinary tract symptoms with an intermittent urinary stream, urinary urgency, and feelings of incomplete bladder emptying.  Furthermore, patient's last upper tract imaging was a CT renal stone study performed 3/20/2024 which noted a 2 mm left interpolar nonobstructing renal calculi.  Otherwise, the patient denied any other lower urinary tract complaints today's office visit.        Review of Systems   Constitutional:  Negative for chills and fever.   HENT:  Negative for ear pain and sore throat.    Eyes:  Negative for pain and visual disturbance.   Respiratory:  Negative for cough and shortness of breath.   "  Cardiovascular:  Negative for chest pain and palpitations.   Gastrointestinal:  Negative for abdominal pain and vomiting.   Genitourinary:  Positive for difficulty urinating and frequency. Negative for decreased urine volume, dysuria, flank pain, hematuria and urgency.   Musculoskeletal:  Negative for arthralgias and back pain.   Skin:  Negative for color change and rash.   Neurological:  Negative for seizures and syncope.   All other systems reviewed and are negative.          AUA SYMPTOM SCORE      Flowsheet Row Most Recent Value   AUA SYMPTOM SCORE    How often have you had a sensation of not emptying your bladder completely after you finished urinating? 3 (P)     How often have you had to urinate again less than two hours after you finished urinating? 3 (P)     How often have you found you stopped and started again several times when you urinate? 4 (P)     How often have you found it difficult to postpone urination? 3 (P)     How often have you had a weak urinary stream? 3 (P)     How often have you had to push or strain to begin urination? 1 (P)     How many times did you most typically get up to urinate from the time you went to bed at night until the time you got up in the morning? 3 (P)     Quality of Life: If you were to spend the rest of your life with your urinary condition just the way it is now, how would you feel about that? 3 (P)     AUA SYMPTOM SCORE 20 (P)               Vitals  Vitals:    12/12/24 0919   BP: 152/86   Pulse: 88   SpO2: 98%   Weight: 73.9 kg (163 lb)   Height: 5' 6\" (1.676 m)       Physical Exam  Vitals reviewed.   Constitutional:       General: He is not in acute distress.     Appearance: Normal appearance. He is not ill-appearing.   HENT:      Head: Normocephalic and atraumatic.      Nose: Nose normal.   Eyes:      General: No scleral icterus.  Pulmonary:      Effort: No respiratory distress.   Abdominal:      General: Abdomen is flat. There is no distension.      Palpations: " Abdomen is soft.      Tenderness: There is no abdominal tenderness.   Musculoskeletal:         General: Normal range of motion.      Cervical back: Normal range of motion.   Skin:     General: Skin is warm.      Coloration: Skin is not jaundiced.   Neurological:      Mental Status: He is alert and oriented to person, place, and time.      Gait: Gait normal.   Psychiatric:         Mood and Affect: Mood normal.         Behavior: Behavior normal.           Past History  Past Medical History:   Diagnosis Date    Hiatal hernia     Hyperlipidemia     Hypertension     Kidney stone     Left inguinal hernia     Lyme disease     last assessed 7/10/2017    Peripheral neuropathy      Social History     Socioeconomic History    Marital status:      Spouse name: Not on file    Number of children: Not on file    Years of education: Not on file    Highest education level: Not on file   Occupational History    Occupation: retired   Tobacco Use    Smoking status: Never    Smokeless tobacco: Never    Tobacco comments:     per [employer] ' former smoker / never a smoker '   Vaping Use    Vaping status: Never Used   Substance and Sexual Activity    Alcohol use: Yes     Alcohol/week: 7.0 standard drinks of alcohol     Types: 7 Glasses of wine per week     Comment: one per day    Drug use: No    Sexual activity: Not on file   Other Topics Concern    Not on file   Social History Narrative    Not on file     Social Drivers of Health     Financial Resource Strain: Not on file   Food Insecurity: Not on file   Transportation Needs: Not on file   Physical Activity: Not on file   Stress: Not on file   Social Connections: Not on file   Intimate Partner Violence: Not on file   Housing Stability: Not on file     Social History     Tobacco Use   Smoking Status Never   Smokeless Tobacco Never   Tobacco Comments    per [employer] ' former smoker / never a smoker '     Family History   Problem Relation Age of Onset    Cancer Father     Coronary  artery disease Mother        The following portions of the patient's history were reviewed and updated as appropriate: allergies, current medications, past medical history, past social history, past surgical history and problem list.    Results  No results found for this or any previous visit (from the past hour).  ]  Lab Results   Component Value Date    PSA 1.3 11/22/2019    PSA 1.5 01/07/2019    PSA 2.0 10/26/2017    PSA 1.4 03/08/2016     Lab Results   Component Value Date    CALCIUM 9.7 01/25/2025    K 4.3 01/25/2025    CO2 30 01/25/2025     01/25/2025    BUN 19 01/25/2025    CREATININE 0.91 01/25/2025     Lab Results   Component Value Date    WBC 5.63 01/25/2025    HGB 15.4 01/25/2025    HCT 48.8 01/25/2025    MCV 89 01/25/2025     01/25/2025       Statement Selected

## 2024-12-16 ENCOUNTER — HOSPITAL ENCOUNTER (OUTPATIENT)
Dept: RADIOLOGY | Facility: HOSPITAL | Age: 79
Discharge: HOME | End: 2024-12-16
Payer: COMMERCIAL

## 2024-12-16 DIAGNOSIS — N20.0 KIDNEY STONE: ICD-10-CM

## 2024-12-16 PROCEDURE — 74018 RADEX ABDOMEN 1 VIEW: CPT

## 2024-12-24 ENCOUNTER — RESULTS FOLLOW-UP (OUTPATIENT)
Dept: UROLOGY | Facility: MEDICAL CENTER | Age: 79
End: 2024-12-24

## 2024-12-24 DIAGNOSIS — R39.9 UTI SYMPTOMS: Primary | ICD-10-CM

## 2024-12-24 NOTE — TELEPHONE ENCOUNTER
I reviewed the patient's most recent KUB.  The x-ray showed a 3 mm calculus overlying the midpole of the left kidney.  With the size of this visualize stone, my recommendation would be to continue to monitor it with no acute intervention.  Patient can schedule an office visit in 1 year and we can obtain imaging study at that time to reevaluate his stone burden.

## 2025-01-24 NOTE — TELEPHONE ENCOUNTER
Patient calling for results, informed of [facility] message above    He states that his discomfort is on his right but the imagining shows kidney stone on the left.    Increase urinary frequency that has been ongoing since his last appointment. Complains of intermittent bladder pain, does not feel like he is emptying completley    Denies dysuria, fevers, hematuria    Encouraged to increase water to 64 ounces daily, decrease bladder irritants, call back if new or worsening symptoms, UA and UCX orders placed

## 2025-01-25 ENCOUNTER — APPOINTMENT (OUTPATIENT)
Dept: LAB | Facility: HOSPITAL | Age: 80
End: 2025-01-25
Payer: COMMERCIAL

## 2025-01-25 DIAGNOSIS — Z00.00 ROUTINE GENERAL MEDICAL EXAMINATION AT A HEALTH CARE FACILITY: ICD-10-CM

## 2025-01-25 DIAGNOSIS — R39.9 UTI SYMPTOMS: ICD-10-CM

## 2025-01-25 DIAGNOSIS — R73.01 IMPAIRED FASTING GLUCOSE: ICD-10-CM

## 2025-01-25 DIAGNOSIS — N39.0 URINARY TRACT INFECTION WITHOUT HEMATURIA, SITE UNSPECIFIED: ICD-10-CM

## 2025-01-25 LAB
ALBUMIN SERPL BCG-MCNC: 4.4 G/DL (ref 3.5–5)
ALP SERPL-CCNC: 69 U/L (ref 34–104)
ALT SERPL W P-5'-P-CCNC: 11 U/L (ref 7–52)
ANION GAP SERPL CALCULATED.3IONS-SCNC: 7 MMOL/L (ref 4–13)
AST SERPL W P-5'-P-CCNC: 15 U/L (ref 13–39)
BACTERIA UR QL AUTO: ABNORMAL /HPF
BASOPHILS # BLD AUTO: 0.03 THOUSANDS/ΜL (ref 0–0.1)
BASOPHILS NFR BLD AUTO: 1 % (ref 0–1)
BILIRUB SERPL-MCNC: 0.57 MG/DL (ref 0.2–1)
BILIRUB UR QL STRIP: NEGATIVE
BUN SERPL-MCNC: 19 MG/DL (ref 5–25)
CALCIUM SERPL-MCNC: 9.7 MG/DL (ref 8.4–10.2)
CHLORIDE SERPL-SCNC: 104 MMOL/L (ref 96–108)
CLARITY UR: CLEAR
CO2 SERPL-SCNC: 30 MMOL/L (ref 21–32)
COLOR UR: YELLOW
CREAT SERPL-MCNC: 0.91 MG/DL (ref 0.6–1.3)
EOSINOPHIL # BLD AUTO: 0.32 THOUSAND/ΜL (ref 0–0.61)
EOSINOPHIL NFR BLD AUTO: 6 % (ref 0–6)
ERYTHROCYTE [DISTWIDTH] IN BLOOD BY AUTOMATED COUNT: 13.9 % (ref 11.6–15.1)
EST. AVERAGE GLUCOSE BLD GHB EST-MCNC: 131 MG/DL
GFR SERPL CREATININE-BSD FRML MDRD: 81 ML/MIN/1.73SQ M
GLUCOSE P FAST SERPL-MCNC: 101 MG/DL (ref 65–99)
GLUCOSE UR STRIP-MCNC: NEGATIVE MG/DL
HBA1C MFR BLD: 6.2 %
HCT VFR BLD AUTO: 48.8 % (ref 36.5–49.3)
HGB BLD-MCNC: 15.4 G/DL (ref 12–17)
HGB UR QL STRIP.AUTO: NEGATIVE
HYALINE CASTS #/AREA URNS LPF: ABNORMAL /LPF
IMM GRANULOCYTES # BLD AUTO: 0.03 THOUSAND/UL (ref 0–0.2)
IMM GRANULOCYTES NFR BLD AUTO: 1 % (ref 0–2)
KETONES UR STRIP-MCNC: NEGATIVE MG/DL
LEUKOCYTE ESTERASE UR QL STRIP: ABNORMAL
LYMPHOCYTES # BLD AUTO: 0.93 THOUSANDS/ΜL (ref 0.6–4.47)
LYMPHOCYTES NFR BLD AUTO: 17 % (ref 14–44)
MCH RBC QN AUTO: 28.1 PG (ref 26.8–34.3)
MCHC RBC AUTO-ENTMCNC: 31.6 G/DL (ref 31.4–37.4)
MCV RBC AUTO: 89 FL (ref 82–98)
MONOCYTES # BLD AUTO: 0.67 THOUSAND/ΜL (ref 0.17–1.22)
MONOCYTES NFR BLD AUTO: 12 % (ref 4–12)
MUCOUS THREADS UR QL AUTO: ABNORMAL
NEUTROPHILS # BLD AUTO: 3.65 THOUSANDS/ΜL (ref 1.85–7.62)
NEUTS SEG NFR BLD AUTO: 63 % (ref 43–75)
NITRITE UR QL STRIP: NEGATIVE
NON-SQ EPI CELLS URNS QL MICRO: ABNORMAL /HPF
NRBC BLD AUTO-RTO: 0 /100 WBCS
PH UR STRIP.AUTO: 5.5 [PH]
PLATELET # BLD AUTO: 216 THOUSANDS/UL (ref 149–390)
PMV BLD AUTO: 11.1 FL (ref 8.9–12.7)
POTASSIUM SERPL-SCNC: 4.3 MMOL/L (ref 3.5–5.3)
PROT SERPL-MCNC: 7.2 G/DL (ref 6.4–8.4)
PROT UR STRIP-MCNC: ABNORMAL MG/DL
RBC # BLD AUTO: 5.48 MILLION/UL (ref 3.88–5.62)
RBC #/AREA URNS AUTO: ABNORMAL /HPF
SODIUM SERPL-SCNC: 141 MMOL/L (ref 135–147)
SP GR UR STRIP.AUTO: 1.02 (ref 1–1.03)
UROBILINOGEN UR STRIP-ACNC: <2 MG/DL
WBC # BLD AUTO: 5.63 THOUSAND/UL (ref 4.31–10.16)
WBC #/AREA URNS AUTO: ABNORMAL /HPF

## 2025-01-25 PROCEDURE — 36415 COLL VENOUS BLD VENIPUNCTURE: CPT

## 2025-01-25 PROCEDURE — 85025 COMPLETE CBC W/AUTO DIFF WBC: CPT

## 2025-01-25 PROCEDURE — 87086 URINE CULTURE/COLONY COUNT: CPT

## 2025-01-25 PROCEDURE — 81001 URINALYSIS AUTO W/SCOPE: CPT

## 2025-01-25 PROCEDURE — 80053 COMPREHEN METABOLIC PANEL: CPT

## 2025-01-25 PROCEDURE — 83036 HEMOGLOBIN GLYCOSYLATED A1C: CPT

## 2025-01-26 LAB — BACTERIA UR CULT: NORMAL

## 2025-02-13 PROBLEM — N20.0 KIDNEY STONE: Status: ACTIVE | Noted: 2017-06-30

## 2025-02-13 NOTE — ASSESSMENT & PLAN NOTE
Patient underwent CT renal stone study on 3/20/2024 which noted a 2 mm left nonobstructing interpolar renal calculi  We discussed obtaining an ultrasound of the kidney and bladder as well as a KUB to further visualize remaining stone burden.  Patient will undergo ultrasound of the kidney and bladder and a KUB in our office will call the results

## 2025-02-13 NOTE — ASSESSMENT & PLAN NOTE
AUA symptom score 20  Patient currently controlled on Flomax 0.4 mg  We discussed further pharmacotherapy with trialing an increase of the Flomax to 0.8 mg daily versus combination treatment with Flomax 0.4 mg and finasteride 5 mg daily.  Patient will trial Flomax 0.8 mg daily and return to the office to reevaluate lower urinary tract symptoms and should undergo PVR at that time

## 2025-02-22 NOTE — TELEPHONE ENCOUNTER
Called patient - he is told of the negative urine culture results.     FAMILY HISTORY:  Aunt  Still living? Unknown  FH: leukemia, Age at diagnosis: Age Unknown

## 2025-08-19 ENCOUNTER — TELEPHONE (OUTPATIENT)
Dept: UROLOGY | Facility: MEDICAL CENTER | Age: 80
End: 2025-08-19

## (undated) DEVICE — ENDOSCOPIC VALVE WITH ADAPTER.: Brand: SURSEAL® II

## (undated) DEVICE — STERILE SURGICAL LUBRICANT,  TUBE: Brand: SURGILUBE

## (undated) DEVICE — SPECIMEN CONTAINER STERILE PEEL PACK

## (undated) DEVICE — UROCATCH BAG

## (undated) DEVICE — CHLORAPREP HI-LITE 26ML ORANGE

## (undated) DEVICE — TUBING SUCTION 5MM X 12 FT

## (undated) DEVICE — ADHESIVE SKN CLSR HISTOACRYL FLEX 0.5ML LF

## (undated) DEVICE — ENDOPATH XCEL BLADELESS TROCARS WITH STABILITY SLEEVES: Brand: ENDOPATH XCEL

## (undated) DEVICE — LIGAMAX 5 MM ENDOSCOPIC MULTIPLE CLIP APPLIER: Brand: LIGAMAX

## (undated) DEVICE — GLOVE INDICATOR PI UNDERGLOVE SZ 8 BLUE

## (undated) DEVICE — SCD SEQUENTIAL COMPRESSION COMFORT SLEEVE MEDIUM KNEE LENGTH: Brand: KENDALL SCD

## (undated) DEVICE — BLUE HEAT SCOPE WARMER

## (undated) DEVICE — GLOVE SRG BIOGEL ECLIPSE 7.5

## (undated) DEVICE — 3000CC GUARDIAN II: Brand: GUARDIAN

## (undated) DEVICE — GUIDEWIRE STRGHT TIP 0.035 IN  SOLO PLUS

## (undated) DEVICE — INVIEW CLEAR LEGGINGS: Brand: CONVERTORS

## (undated) DEVICE — 3M™ STERI-STRIP™ COMPOUND BENZOIN TINCTURE 40 BAGS/CARTON 4 CARTONS/CASE C1544: Brand: 3M™ STERI-STRIP™

## (undated) DEVICE — SINGLE-USE DIGITAL FLEXIBLE URETEROSCOPE: Brand: APTRA

## (undated) DEVICE — INTENDED FOR TISSUE SEPARATION, AND OTHER PROCEDURES THAT REQUIRE A SHARP SURGICAL BLADE TO PUNCTURE OR CUT.: Brand: BARD-PARKER SAFETY BLADES SIZE 11, STERILE

## (undated) DEVICE — SUT MONOCRYL 4-0 PS-2 27 IN Y426H

## (undated) DEVICE — AEM CORD

## (undated) DEVICE — PREMIUM DRY TRAY LF: Brand: MEDLINE INDUSTRIES, INC.

## (undated) DEVICE — SINGLE PORT MANIFOLD: Brand: NEPTUNE 2

## (undated) DEVICE — FIBER HOLMIUM MP 200 MICRON SMARTSCOPE

## (undated) DEVICE — GLOVE SRG BIOGEL 6.5

## (undated) DEVICE — PACK TUR

## (undated) DEVICE — 3M™ TEGADERM™ TRANSPARENT FILM DRESSING FRAME STYLE, 1624W, 2-3/8 IN X 2-3/4 IN (6 CM X 7 CM), 100/CT 4CT/CASE: Brand: 3M™ TEGADERM™

## (undated) DEVICE — UROLOGIC DRAIN BAG: Brand: UNBRANDED

## (undated) DEVICE — BASKET STONE RTRVL PLTN CL 3FR 115CM

## (undated) DEVICE — CATH URETERAL 5FR X 70 CM FLEX TIP POLYUR BARD

## (undated) DEVICE — TROCAR HERNIA OVAL PERITONEAL DISTENTION BALLOON

## (undated) DEVICE — SUT SILK 2-0 SH 30 IN K833H

## (undated) DEVICE — GLOVE SRG BIOGEL 7.5

## (undated) DEVICE — LAP AEM SCISSOR TIP .75 IN

## (undated) DEVICE — BASKET SPECIMEN RETRIVAL 1.9FR 120CM

## (undated) DEVICE — TRAY FOLEY 16FR URIMETER SURESTEP

## (undated) DEVICE — ALLENTOWN LAP CHOLE APP PACK: Brand: CARDINAL HEALTH

## (undated) DEVICE — SHEATH URETERAL ACCESS 10/12FR 35CM PROXIS

## (undated) DEVICE — FIXATION SECURE STRAP 5MM W/12 ABSORABLE STRAPS

## (undated) DEVICE — EXIDINE 4 PCT

## (undated) DEVICE — SUT VICRYL 0 UR-6 27 IN J603H

## (undated) DEVICE — REM POLYHESIVE ADULT PATIENT RETURN ELECTRODE: Brand: VALLEYLAB

## (undated) DEVICE — Device

## (undated) DEVICE — FIBER STD QUANTA 272 MICRON

## (undated) DEVICE — TROCAR HERNIA BALLON STRUCTURED 10 MM

## (undated) DEVICE — GLOVE INDICATOR PI UNDERGLOVE SZ 7 BLUE

## (undated) DEVICE — [HIGH FLOW INSUFFLATOR,  DO NOT USE IF PACKAGE IS DAMAGED,  KEEP DRY,  KEEP AWAY FROM SUNLIGHT,  PROTECT FROM HEAT AND RADIOACTIVE SOURCES.]: Brand: PNEUMOSURE

## (undated) DEVICE — GLOVE INDICATOR PI UNDERGLOVE SZ 7.5 BLUE

## (undated) DEVICE — GLOVE SRG BIOGEL 7